# Patient Record
Sex: MALE | Race: WHITE | Employment: OTHER | URBAN - METROPOLITAN AREA
[De-identification: names, ages, dates, MRNs, and addresses within clinical notes are randomized per-mention and may not be internally consistent; named-entity substitution may affect disease eponyms.]

---

## 2019-06-10 RX ORDER — LISINOPRIL 10 MG/1
10 TABLET ORAL DAILY
Qty: 90 TABLET | Refills: 1 | Status: SHIPPED | OUTPATIENT
Start: 2019-06-10 | End: 2019-12-03 | Stop reason: SDUPTHER

## 2019-06-16 PROBLEM — E78.2 MIXED HYPERLIPIDEMIA: Status: ACTIVE | Noted: 2019-06-16

## 2019-06-16 PROBLEM — E66.09 CLASS 1 OBESITY DUE TO EXCESS CALORIES WITHOUT SERIOUS COMORBIDITY IN ADULT: Status: ACTIVE | Noted: 2019-06-16

## 2019-06-16 PROBLEM — I10 HYPERTENSION: Status: ACTIVE | Noted: 2019-06-16

## 2019-06-16 PROBLEM — M15.9 PRIMARY OSTEOARTHRITIS INVOLVING MULTIPLE JOINTS: Status: ACTIVE | Noted: 2019-06-16

## 2019-06-16 PROBLEM — E66.811 CLASS 1 OBESITY DUE TO EXCESS CALORIES WITHOUT SERIOUS COMORBIDITY IN ADULT: Status: ACTIVE | Noted: 2019-06-16

## 2019-06-16 PROBLEM — M15.0 PRIMARY OSTEOARTHRITIS INVOLVING MULTIPLE JOINTS: Status: ACTIVE | Noted: 2019-06-16

## 2019-06-16 NOTE — PROGRESS NOTES
19  Mark Marino : 1950 Sex: male  Age: 77 y.o. Chief Complaint   Patient presents with    Hypertension       Multitude of issues today. The patient has had some increased peripheral lymphedema over the summer months. Patient also is complaining of shortness of breath with exertion. This is a little bit worse than it had been previously. Denies any chest pain, chest pressure, nausea, vomiting, diaphoresis with this exertion. Patient is blaming a lot of his problems on his back. Patient has been obese for a good number of years and has really not had the motivation to lose weight. He and I discussed this today including possibly joining weight watchers. He should previously had lumbar surgery by Dr. Curtis Kohli in 2017. Had recurrent lumbar stenosis symptomatology. Planing of bilateral carpal tunnel symptoms which occur mostly at nighttime. This will need further evaluation. Patient is not having orthopnea or PND. No GI or  complaints. Review of Systems  Health Maintenance:  Colonoscopy - (2017)  Colonoscopy Screening - (2017)  Couseled on Home Safety - (5/10/2017)  Physical Exam - (2018)  Prostate Exam - (2016)  Psa Test - (2017)  Rectal Exam - (2016)  EKG  Colonoscopy - (2017) Tabatha Johnson  EGD - () VITO  Sleep Study - (2014)  Influenza Vaccination - (2017)  Prevnar Vaccine - (2018) SLIP GIVEN  Shingrix Vaccine (Shingles) - (2018) SLIP GIVEN  1. Obesity. 2. Hyperlipidemia. BEGAN SIMVISTATIN 2/1/10  3. HYPERTENSION BEGAN LISINOPRIL/HCT 2/1/10  4. Erectile dysfunction. Mark Marino  1950 Page #2  5. History of basal cell carcinoma. 6. History of previous sebaceous cyst.  7. OA HAD BILATERAL KNEE REPLACEMENTS  8. CHRONIC BACK PAIN- TASH EXERCISES RECOMMENDED 3/11  9. ESOPHAGITIS/PUD-  VITO  10. FEVER/DIARRHEA/RASH - PROBABLY VIRAL 12 ADMITTED TO [facility]- SEE REPORTS  11. HYPOGONADISM- MRI ORDERED 12  12. SNORING-FATIGUE- SLEEP APNEA- SLEEP STUDY ORDERED 10/13-CPAP BEGUN  13. LUMBAR RADICULOPATHY-8/17/15-PHILLIP/DONNY-SURGERY SCHEDULED 17  14. PNEUMONIA- 5/10/16-CXR CLEARED  15. CARDIOMEGALY-16-NORMAL ECHO 16 and  16. LOW BACK PAIN- REFERRED TO [facility] 18  17. OBESITY- GLP1 DISCUSSED 10/31/18  Surgical Hx:  Knee Replacement - () BILATERAL-  1. He had right shoulder surgery in .  2. He had a vasectomy at age 33.  1. He had bilateral knee replacements in . 4. Sebaceous cyst removal by Dr. Prashanth Ballesteros in . 5. Basal cell carcinoma by Dr. Marvin Barrios in . Reviewed and updated. FH:  Father:  Cerebrovascular Accident (CVA) - age 80. Mother:  Leukemia -  in her 29's. Reviewed, no changes. SH:  Marital: . Personal Habits: Cigarette Use: Former Cigarette Smoker 1 Pack Daily - for 30 years Negative For  current cigarette smoker. Alcohol: Occasionally consumes alcohol. Daily Caffeine: Consumes on  average 4 cups of hot tea per day - more recently 2 cups. Exercise Type: Walks sporadically. Reviewed, no changes. Date: 10/31/2018  Was the patient queried about smoking behavior? Yes No  Does the patient currently smoke? Smoking: Patient is a former smoker. Current Outpatient Medications:     sildenafil (REVATIO) 20 MG tablet, Take 1 tablet by mouth daily as needed (erectile dysfunction) 3-5 before intercourse as needed, Disp: 50 tablet, Rfl: 2    fluticasone (FLONASE) 50 MCG/ACT nasal spray, 2 sprays by Each Nostril route daily, Disp: , Rfl:     lisinopril (PRINIVIL;ZESTRIL) 10 MG tablet, Take 1 tablet by mouth daily, Disp: 90 tablet, Rfl: 1    therapeutic multivitamin-minerals (THERAGRAN-M) tablet, Take 1 tablet by mouth daily.   , Disp: , Rfl:   Allergies   Allergen Reactions    Statins        Past Medical History:   Diagnosis Date    Chronic back pain     Erectile dysfunction     Esophagitis 2011    VITO    H/O cardiomegaly     NORMAL ECHO IN 2016    H/O hypogonadism     History of basal cell carcinoma     History of pneumonia 2016    History of sebaceous cyst     Hyperlipidemia     Hypertension     Obesity     Osteoarthritis     Sleep apnea      Past Surgical History:   Procedure Laterality Date    CYST REMOVAL  2005    Shabbir Koenig    ESOPHGOSCOPY  9/10/2011    foreign body removal    KNEE SURGERY  2004    bilateral knee replacements    SHOULDER SURGERY  1997    right rotator cuff    SKIN CANCER EXCISION      CUDDAPAH    VASECTOMY      AGE 33     Family History   Problem Relation Age of Onset    Other Mother         LEUKEMIA    Stroke Father      Social History     Socioeconomic History    Marital status:      Spouse name: Not on file    Number of children: Not on file    Years of education: Not on file    Highest education level: Not on file   Occupational History    Not on file   Social Needs    Financial resource strain: Not on file    Food insecurity:     Worry: Not on file     Inability: Not on file    Transportation needs:     Medical: Not on file     Non-medical: Not on file   Tobacco Use    Smoking status: Former Smoker     Last attempt to quit:      Years since quittin.4    Smokeless tobacco: Never Used   Substance and Sexual Activity    Alcohol use:  Yes     Alcohol/week: 9.0 oz     Types: 15 Shots of liquor per week    Drug use: No    Sexual activity: Not on file   Lifestyle    Physical activity:     Days per week: Not on file     Minutes per session: Not on file    Stress: Not on file   Relationships    Social connections:     Talks on phone: Not on file     Gets together: Not on file     Attends Anabaptism service: Not on file     Active member of club or organization: Not on file     Attends meetings of clubs or organizations: Not on file     Relationship status: Not on file    Intimate partner violence:     Fear of current or ex partner: Not on file     Emotionally abused: Not on file     Physically abused: Not on file     Forced sexual activity: Not on file   Other Topics Concern    Not on file   Social History Narrative    Not on file       Vitals:    06/19/19 1105   BP: 132/84   Pulse: 75   SpO2: 95%   Weight: 273 lb (123.8 kg)   Height: 6' (1.829 m)       Physical Exam  Exam:  Const: Appears healthy,well developed and well nourished. Appears obese. Eyes: EOMI in both eyes. PERRL. ENMT: External canals are clear and dry. Tympanic membranes: thickening. External nose WNL. Moistness and normal color of the nasal mucosae. Septum is in the midline. Full upper and full lower  dentures. Gums appear healthy. Posterior pharynx shows no exudate, irritation or redness. Neck: Supple and symmetric. Palpation reveals no adenopathy. No masses appreciated. Thyroid  exhibits no nodules or thyromegaly. No JVD. Resp: Respirations are unlabored. Respiration rate is normal. Auscultate good airflow. No rales,  rhonchi or wheezes appreciated over the lungs bilaterally. CV: Rhythm is regular. S1 is normal. S2 is normal. No heart murmur appreciated. Carotids: no  bruits. Abdominal aorta is not palpable. Pedal pulses: 2+ and equal bilaterally No abdominal bruits. Extremities: No clubbing, cyanosis or edema is 1-2+ below the mid tibia bilaterally. Abdomen: Bowel sounds are normoactive. Palpation of the abdomen reveals softness, but no  distension, organomegaly or tenderness. No abdominal masses. No palpable hepatosplenomegaly. Musculo: Walks with a limping gait. Upper Extremities: Full ROM bilaterally. Lower Extremities:  Limitation of the lower extremities. Skin: Dry and warm with no rash. Neuro: Alert and oriented x3. Mood is normal. Affect is normal. Speech is articulate and fluent. Reflexes: DTR's are intact, symmetric and 2+ bilaterally. Psych: Patient's attitude is cooperative. Patient's affect is appropriate. Judgement is realistic. Insight  is appropriate. Assessment and Plan:  Javan Velasquez was seen today for hypertension. Diagnoses and all orders for this visit:    Shortness of breath  -     EKG 12 lead; Future  -     BRAIN NATRIURETIC PEPTIDE; Future  -     EKG 12 lead    Essential hypertension  -     EKG 12 lead; Future  -     Comp Metabolic w Bili Profile; Future  -     CBC; Future  -     EKG 12 lead    Primary osteoarthritis involving multiple joints    Mixed hyperlipidemia  -     LIPID PANEL; Future    Class 1 obesity due to excess calories without serious comorbidity with body mass index (BMI) of 32.0 to 32.9 in adult    Bilateral carpal tunnel syndrome  -     Luzmaria Mcdonald MD, [facility], [address] ([place])    Spinal stenosis of lumbar region with neurogenic claudication  -     Luzmaria Mcdonald MD, [facility], [address] ([place])    Erectile dysfunction due to arterial insufficiency  -     sildenafil (REVATIO) 20 MG tablet; Take 1 tablet by mouth daily as needed (erectile dysfunction) 3-5 before intercourse as needed    Patient is referred to Dr. Saji Darling for recurrent back pain and carpal tunnel. He will need EMG testing. He is getting to the point where he is not able to feel with his fingers and is dropping things. This will definitely sounds like a problem with cervical repair. Patient is not having cervical radicular symptoms so I think this is exclusively carpal tunnel. Patient will be seen back in 4 weeks. He is to join [employer]. He is to start watching salt and fluids. I will do a work-up including EKG and brain natruretic peptide. Return in about 1 month (around 7/17/2019).       Seen By:  Tanya Garcia MD

## 2019-06-18 VITALS
WEIGHT: 265 LBS | BODY MASS INDEX: 35.89 KG/M2 | HEIGHT: 72 IN | SYSTOLIC BLOOD PRESSURE: 126 MMHG | DIASTOLIC BLOOD PRESSURE: 78 MMHG | HEART RATE: 70 BPM

## 2019-06-18 RX ORDER — TADALAFIL 20 MG/1
20 TABLET ORAL PRN
COMMUNITY
End: 2019-06-19

## 2019-06-18 RX ORDER — FLUTICASONE PROPIONATE 50 MCG
2 SPRAY, SUSPENSION (ML) NASAL DAILY
COMMUNITY
End: 2019-11-04

## 2019-06-19 ENCOUNTER — HOSPITAL ENCOUNTER (OUTPATIENT)
Age: 69
Discharge: HOME | End: 2019-06-21
Payer: MEDICARE

## 2019-06-19 ENCOUNTER — OFFICE VISIT (OUTPATIENT)
Dept: FAMILY MEDICINE CLINIC | Age: 69
End: 2019-06-19
Payer: MEDICARE

## 2019-06-19 VITALS
HEART RATE: 75 BPM | HEIGHT: 72 IN | OXYGEN SATURATION: 95 % | SYSTOLIC BLOOD PRESSURE: 132 MMHG | WEIGHT: 273 LBS | BODY MASS INDEX: 36.98 KG/M2 | DIASTOLIC BLOOD PRESSURE: 84 MMHG

## 2019-06-19 DIAGNOSIS — G56.03 BILATERAL CARPAL TUNNEL SYNDROME: ICD-10-CM

## 2019-06-19 DIAGNOSIS — E78.2 MIXED HYPERLIPIDEMIA: ICD-10-CM

## 2019-06-19 DIAGNOSIS — R06.02 SHORTNESS OF BREATH: ICD-10-CM

## 2019-06-19 DIAGNOSIS — I10 ESSENTIAL HYPERTENSION: ICD-10-CM

## 2019-06-19 DIAGNOSIS — M48.062 SPINAL STENOSIS OF LUMBAR REGION WITH NEUROGENIC CLAUDICATION: ICD-10-CM

## 2019-06-19 DIAGNOSIS — N52.01 ERECTILE DYSFUNCTION DUE TO ARTERIAL INSUFFICIENCY: ICD-10-CM

## 2019-06-19 DIAGNOSIS — E66.09 CLASS 1 OBESITY DUE TO EXCESS CALORIES WITHOUT SERIOUS COMORBIDITY WITH BODY MASS INDEX (BMI) OF 32.0 TO 32.9 IN ADULT: ICD-10-CM

## 2019-06-19 DIAGNOSIS — M15.9 PRIMARY OSTEOARTHRITIS INVOLVING MULTIPLE JOINTS: ICD-10-CM

## 2019-06-19 DIAGNOSIS — R06.02 SHORTNESS OF BREATH: Primary | ICD-10-CM

## 2019-06-19 LAB
ALBUMIN SERPL-MCNC: 4 G/DL (ref 3.5–5.2)
ALP BLD-CCNC: 80 U/L (ref 40–129)
ALT SERPL-CCNC: 37 U/L (ref 0–40)
ANION GAP SERPL CALCULATED.3IONS-SCNC: 17 MMOL/L (ref 7–16)
AST SERPL-CCNC: 41 U/L (ref 0–39)
BILIRUB SERPL-MCNC: 0.5 MG/DL (ref 0–1.2)
BILIRUBIN DIRECT: <0.2 MG/DL (ref 0–0.3)
BILIRUBIN, INDIRECT: NORMAL MG/DL (ref 0–1)
BUN BLDV-MCNC: 13 MG/DL (ref 8–23)
CALCIUM SERPL-MCNC: 9.7 MG/DL (ref 8.6–10.2)
CHLORIDE BLD-SCNC: 100 MMOL/L (ref 98–107)
CHOLESTEROL, TOTAL: 240 MG/DL (ref 0–199)
CO2: 23 MMOL/L (ref 22–29)
CREAT SERPL-MCNC: 0.9 MG/DL (ref 0.7–1.2)
GFR AFRICAN AMERICAN: >60
GFR NON-AFRICAN AMERICAN: >60 ML/MIN/1.73
GLUCOSE BLD-MCNC: 98 MG/DL (ref 74–99)
HCT VFR BLD CALC: 50.9 % (ref 37–54)
HDLC SERPL-MCNC: 60 MG/DL
HEMOGLOBIN: 16.4 G/DL (ref 12.5–16.5)
LDL CHOLESTEROL CALCULATED: 144 MG/DL (ref 0–99)
MCH RBC QN AUTO: 34.1 PG (ref 26–35)
MCHC RBC AUTO-ENTMCNC: 32.2 % (ref 32–34.5)
MCV RBC AUTO: 105.8 FL (ref 80–99.9)
PDW BLD-RTO: 13.4 FL (ref 11.5–15)
PLATELET # BLD: 296 E9/L (ref 130–450)
PMV BLD AUTO: 11.5 FL (ref 7–12)
POTASSIUM SERPL-SCNC: 4.8 MMOL/L (ref 3.5–5)
PRO-BNP: 222 PG/ML (ref 0–125)
RBC # BLD: 4.81 E12/L (ref 3.8–5.8)
SODIUM BLD-SCNC: 140 MMOL/L (ref 132–146)
TOTAL PROTEIN: 7.7 G/DL (ref 6.4–8.3)
TRIGL SERPL-MCNC: 178 MG/DL (ref 0–149)
VLDLC SERPL CALC-MCNC: 36 MG/DL
WBC # BLD: 9.3 E9/L (ref 4.5–11.5)

## 2019-06-19 PROCEDURE — 82247 BILIRUBIN TOTAL: CPT

## 2019-06-19 PROCEDURE — 93000 ELECTROCARDIOGRAM COMPLETE: CPT | Performed by: INTERNAL MEDICINE

## 2019-06-19 PROCEDURE — 82248 BILIRUBIN DIRECT: CPT

## 2019-06-19 PROCEDURE — 80061 LIPID PANEL: CPT

## 2019-06-19 PROCEDURE — 83880 ASSAY OF NATRIURETIC PEPTIDE: CPT

## 2019-06-19 PROCEDURE — 85027 COMPLETE CBC AUTOMATED: CPT

## 2019-06-19 PROCEDURE — 99214 OFFICE O/P EST MOD 30 MIN: CPT | Performed by: INTERNAL MEDICINE

## 2019-06-19 PROCEDURE — 36415 COLL VENOUS BLD VENIPUNCTURE: CPT

## 2019-06-19 PROCEDURE — 80053 COMPREHEN METABOLIC PANEL: CPT

## 2019-06-19 RX ORDER — SILDENAFIL CITRATE 20 MG/1
20 TABLET ORAL DAILY PRN
Qty: 50 TABLET | Refills: 2 | Status: ON HOLD | OUTPATIENT
Start: 2019-06-19 | End: 2019-12-27 | Stop reason: HOSPADM

## 2019-06-21 ENCOUNTER — TELEPHONE (OUTPATIENT)
Dept: FAMILY MEDICINE CLINIC | Age: 69
End: 2019-06-21

## 2019-06-21 NOTE — TELEPHONE ENCOUNTER
----- Message from Carly Galicia MD sent at 6/20/2019  5:17 AM EDT -----  Lipids are very high. Other blood work indicates probable excessive alcohol consumption. This needs to be severely limited. Patient appears to be fluid overloaded. He should begin Lasix 20 mg every other day. He will need to follow in 2 to 3 weeks since we are starting the Lasix. Please cue the Lasix. He needs to begin Zetia to control his lipids. Please cue this at 10 mg/day. Is call and send the results to the patient.

## 2019-06-24 RX ORDER — FUROSEMIDE 20 MG/1
20 TABLET ORAL 2 TIMES DAILY
COMMUNITY
End: 2019-06-24 | Stop reason: SDUPTHER

## 2019-06-24 RX ORDER — EZETIMIBE 10 MG/1
10 TABLET ORAL DAILY
COMMUNITY
End: 2019-06-26 | Stop reason: SDUPTHER

## 2019-06-26 RX ORDER — EZETIMIBE 10 MG/1
10 TABLET ORAL DAILY
Qty: 30 TABLET | Refills: 2 | Status: SHIPPED | OUTPATIENT
Start: 2019-06-26 | End: 2019-07-17 | Stop reason: CLARIF

## 2019-07-10 RX ORDER — EZETIMIBE 10 MG/1
10 TABLET ORAL DAILY
Qty: 30 TABLET | Refills: 1 | Status: SHIPPED | OUTPATIENT
Start: 2019-07-10 | End: 2019-11-04

## 2019-07-10 RX ORDER — FUROSEMIDE 20 MG/1
20 TABLET ORAL EVERY OTHER DAY
Qty: 30 TABLET | Refills: 0 | Status: SHIPPED | OUTPATIENT
Start: 2019-07-10 | End: 2019-11-04

## 2019-07-17 ENCOUNTER — OFFICE VISIT (OUTPATIENT)
Dept: FAMILY MEDICINE CLINIC | Age: 69
End: 2019-07-17
Payer: MEDICARE

## 2019-07-17 VITALS
HEIGHT: 72 IN | OXYGEN SATURATION: 97 % | DIASTOLIC BLOOD PRESSURE: 70 MMHG | SYSTOLIC BLOOD PRESSURE: 130 MMHG | BODY MASS INDEX: 36.44 KG/M2 | WEIGHT: 269 LBS | HEART RATE: 81 BPM

## 2019-07-17 DIAGNOSIS — G47.30 SLEEP APNEA, UNSPECIFIED TYPE: ICD-10-CM

## 2019-07-17 DIAGNOSIS — G56.03 BILATERAL CARPAL TUNNEL SYNDROME: ICD-10-CM

## 2019-07-17 DIAGNOSIS — R21 RASH: ICD-10-CM

## 2019-07-17 DIAGNOSIS — N52.01 ERECTILE DYSFUNCTION DUE TO ARTERIAL INSUFFICIENCY: ICD-10-CM

## 2019-07-17 DIAGNOSIS — I50.9 CONGESTIVE HEART FAILURE, UNSPECIFIED HF CHRONICITY, UNSPECIFIED HEART FAILURE TYPE (HCC): Primary | ICD-10-CM

## 2019-07-17 PROBLEM — N52.9 ERECTILE DYSFUNCTION: Status: ACTIVE | Noted: 2019-07-17

## 2019-07-17 PROCEDURE — 99214 OFFICE O/P EST MOD 30 MIN: CPT | Performed by: INTERNAL MEDICINE

## 2019-07-17 NOTE — PROGRESS NOTES
 Esophagitis 2011    VITO    H/O cardiomegaly     NORMAL ECHO IN 2016    H/O hypogonadism 2012    History of basal cell carcinoma     History of pneumonia 2016    History of sebaceous cyst     Hyperlipidemia     Hypertension     Obesity     Osteoarthritis     Sleep apnea      Past Surgical History:   Procedure Laterality Date    CYST REMOVAL  2005    Benjaman Brow    ESOPHGOSCOPY  9/10/2011    foreign body removal    KNEE SURGERY  2004    bilateral knee replacements    SHOULDER SURGERY  1997    right rotator cuff    SKIN CANCER EXCISION  2003    CUDDAPAH    VASECTOMY      AGE 33     Family History   Problem Relation Age of Onset    Other Mother         LEUKEMIA    Stroke Father      Social History     Socioeconomic History    Marital status:      Spouse name: Not on file    Number of children: Not on file    Years of education: Not on file    Highest education level: Not on file   Occupational History    Not on file   Social Needs    Financial resource strain: Not on file    Food insecurity:     Worry: Not on file     Inability: Not on file    Transportation needs:     Medical: Not on file     Non-medical: Not on file   Tobacco Use    Smoking status: Former Smoker     Last attempt to quit:      Years since quittin.5    Smokeless tobacco: Never Used   Substance and Sexual Activity    Alcohol use:  Yes     Alcohol/week: 15.0 standard drinks     Types: 15 Shots of liquor per week    Drug use: No    Sexual activity: Not on file   Lifestyle    Physical activity:     Days per week: Not on file     Minutes per session: Not on file    Stress: Not on file   Relationships    Social connections:     Talks on phone: Not on file     Gets together: Not on file     Attends Mandaeism service: Not on file     Active member of club or organization: Not on file     Attends meetings of clubs or organizations: Not on file     Relationship status: Not on file    Intimate partner violence: Fear of current or ex partner: Not on file     Emotionally abused: Not on file     Physically abused: Not on file     Forced sexual activity: Not on file   Other Topics Concern    Not on file   Social History Narrative    Not on file       Vitals:    07/17/19 1519   BP: 130/70   Pulse: 81   SpO2: 97%   Weight: 269 lb (122 kg)   Height: 6' (1.829 m)       Physical Exam  Exam:  Const: Appears healthy,well developed and well nourished. Appears obese. Eyes: EOMI in both eyes. PERRL. ENMT: External canals are clear and dry. Tympanic membranes: thickening. External nose WNL. Moistness and normal color of the nasal mucosae. Septum is in the midline. Full upper and full lower  dentures. Gums appear healthy. Posterior pharynx shows no exudate, irritation or redness. Neck: Supple and symmetric. Palpation reveals no adenopathy. No masses appreciated. Thyroid  exhibits no nodules or thyromegaly. No JVD. Resp: Respirations are unlabored. Respiration rate is normal. Auscultate good airflow. No rales,  rhonchi or wheezes appreciated over the lungs bilaterally. CV: Rhythm is regular. S1 is normal. S2 is normal. No heart murmur appreciated. Carotids: no  bruits. Abdominal aorta is not palpable. Pedal pulses: 2+ and equal bilaterally No abdominal bruits. Extremities: No clubbing, cyanosis or edema is 1-2+ below the mid tibia bilaterally. Abdomen: Bowel sounds are normoactive. Palpation of the abdomen reveals softness, but no  distension, organomegaly or tenderness. No abdominal masses. No palpable hepatosplenomegaly. Musculo: Walks with a limping gait. Upper Extremities: Full ROM bilaterally. Lower Extremities:  Limitation of the lower extremities. Skin: Dry and warm with no rash. Neuro: Alert and oriented x3. Mood is normal. Affect is normal. Speech is articulate and fluent. Reflexes: DTR's are intact, symmetric and 2+ bilaterally. Psych: Patient's attitude is cooperative. Patient's affect is appropriate.

## 2019-07-31 ENCOUNTER — OFFICE VISIT (OUTPATIENT)
Dept: FAMILY MEDICINE CLINIC | Age: 69
End: 2019-07-31
Payer: MEDICARE

## 2019-07-31 ENCOUNTER — HOSPITAL ENCOUNTER (OUTPATIENT)
Age: 69
Discharge: HOME | End: 2019-08-02
Payer: MEDICARE

## 2019-07-31 VITALS
SYSTOLIC BLOOD PRESSURE: 126 MMHG | OXYGEN SATURATION: 96 % | HEIGHT: 72 IN | HEART RATE: 64 BPM | DIASTOLIC BLOOD PRESSURE: 80 MMHG | BODY MASS INDEX: 36.3 KG/M2 | WEIGHT: 268 LBS

## 2019-07-31 DIAGNOSIS — I50.9 CONGESTIVE HEART FAILURE, UNSPECIFIED HF CHRONICITY, UNSPECIFIED HEART FAILURE TYPE (HCC): ICD-10-CM

## 2019-07-31 DIAGNOSIS — E78.2 MIXED HYPERLIPIDEMIA: ICD-10-CM

## 2019-07-31 DIAGNOSIS — I50.9 CONGESTIVE HEART FAILURE, UNSPECIFIED HF CHRONICITY, UNSPECIFIED HEART FAILURE TYPE (HCC): Primary | ICD-10-CM

## 2019-07-31 DIAGNOSIS — M15.9 PRIMARY OSTEOARTHRITIS INVOLVING MULTIPLE JOINTS: ICD-10-CM

## 2019-07-31 LAB
ALBUMIN SERPL-MCNC: 4 G/DL (ref 3.5–5.2)
ALP BLD-CCNC: 79 U/L (ref 40–129)
ALT SERPL-CCNC: 50 U/L (ref 0–40)
ANION GAP SERPL CALCULATED.3IONS-SCNC: 13 MMOL/L (ref 7–16)
AST SERPL-CCNC: 52 U/L (ref 0–39)
BILIRUB SERPL-MCNC: 0.5 MG/DL (ref 0–1.2)
BUN BLDV-MCNC: 16 MG/DL (ref 8–23)
CALCIUM SERPL-MCNC: 9.6 MG/DL (ref 8.6–10.2)
CHLORIDE BLD-SCNC: 102 MMOL/L (ref 98–107)
CHOLESTEROL, TOTAL: 219 MG/DL (ref 0–199)
CO2: 24 MMOL/L (ref 22–29)
CREAT SERPL-MCNC: 0.9 MG/DL (ref 0.7–1.2)
GFR AFRICAN AMERICAN: >60
GFR NON-AFRICAN AMERICAN: >60 ML/MIN/1.73
GLUCOSE BLD-MCNC: 102 MG/DL (ref 74–99)
HDLC SERPL-MCNC: 57 MG/DL
LDL CHOLESTEROL CALCULATED: 133 MG/DL (ref 0–99)
POTASSIUM SERPL-SCNC: 4.6 MMOL/L (ref 3.5–5)
PRO-BNP: 149 PG/ML (ref 0–125)
SODIUM BLD-SCNC: 139 MMOL/L (ref 132–146)
TOTAL PROTEIN: 7.9 G/DL (ref 6.4–8.3)
TRIGL SERPL-MCNC: 144 MG/DL (ref 0–149)
VLDLC SERPL CALC-MCNC: 29 MG/DL

## 2019-07-31 PROCEDURE — 83880 ASSAY OF NATRIURETIC PEPTIDE: CPT

## 2019-07-31 PROCEDURE — 80053 COMPREHEN METABOLIC PANEL: CPT

## 2019-07-31 PROCEDURE — 36415 COLL VENOUS BLD VENIPUNCTURE: CPT

## 2019-07-31 PROCEDURE — 99213 OFFICE O/P EST LOW 20 MIN: CPT | Performed by: INTERNAL MEDICINE

## 2019-07-31 PROCEDURE — 80061 LIPID PANEL: CPT

## 2019-07-31 NOTE — PROGRESS NOTES
Weight: 268 lb (121.6 kg)   Height: 6' (1.829 m)       Physical Exam  Exam:  Const: Appears healthy,well developed and well nourished. Appears obese. Eyes: EOMI in both eyes. PERRL. ENMT: External canals are clear and dry. Tympanic membranes: thickening. External nose WNL. Moistness and normal color of the nasal mucosae. Septum is in the midline. Full upper and full lower  dentures. Gums appear healthy. Posterior pharynx shows no exudate, irritation or redness. Neck: Supple and symmetric. Palpation reveals no adenopathy. No masses appreciated. Thyroid  exhibits no nodules or thyromegaly. No JVD. Resp: Respirations are unlabored. Respiration rate is normal. Auscultate good airflow. No rales,  rhonchi or wheezes appreciated over the lungs bilaterally. CV: Rhythm is regular. S1 is normal. S2 is normal. No heart murmur appreciated. Carotids: no  bruits. Abdominal aorta is not palpable. Pedal pulses: 2+ and equal bilaterally No abdominal bruits. Extremities: No clubbing, cyanosis or edema is 1-2+ below the mid tibia bilaterally. Abdomen: Bowel sounds are normoactive. Palpation of the abdomen reveals softness, but no  distension, organomegaly or tenderness. No abdominal masses. No palpable hepatosplenomegaly. Musculo: Walks with a limping gait. Upper Extremities: Full ROM bilaterally. Lower Extremities:  Limitation of the lower extremities. Skin: Dry and warm with no rash. Neuro: Alert and oriented x3. Mood is normal. Affect is normal. Speech is articulate and fluent. Reflexes: DTR's are intact, symmetric and 2+ bilaterally. Psych: Patient's attitude is cooperative. Patient's affect is appropriate. Judgement is realistic. Insight  is appropriate. Julia Pinedo was seen today for hypertension. Diagnoses and all orders for this visit:    Congestive heart failure, unspecified HF chronicity, unspecified heart failure type ([address]  -     COMPREHENSIVE METABOLIC PANEL;  Future  -     BRAIN NATRIURETIC PEPTIDE;

## 2019-10-16 ENCOUNTER — TELEPHONE (OUTPATIENT)
Dept: FAMILY MEDICINE CLINIC | Age: 69
End: 2019-10-16

## 2019-11-01 ENCOUNTER — HOSPITAL ENCOUNTER (OUTPATIENT)
Age: 69
Discharge: HOME | End: 2019-11-01
Payer: MEDICARE

## 2019-11-01 LAB
ANION GAP SERPL CALCULATED.3IONS-SCNC: 13 MMOL/L (ref 7–16)
APTT: 33.2 SEC (ref 24.5–35.1)
BASOPHILS ABSOLUTE: 0.07 E9/L (ref 0–0.2)
BASOPHILS RELATIVE PERCENT: 0.8 % (ref 0–2)
BUN BLDV-MCNC: 12 MG/DL (ref 8–23)
CALCIUM SERPL-MCNC: 9.8 MG/DL (ref 8.6–10.2)
CHLORIDE BLD-SCNC: 97 MMOL/L (ref 98–107)
CO2: 26 MMOL/L (ref 22–29)
CREAT SERPL-MCNC: 0.9 MG/DL (ref 0.7–1.2)
EOSINOPHILS ABSOLUTE: 0.47 E9/L (ref 0.05–0.5)
EOSINOPHILS RELATIVE PERCENT: 5.1 % (ref 0–6)
GFR AFRICAN AMERICAN: >60
GFR NON-AFRICAN AMERICAN: >60 ML/MIN/1.73
GLUCOSE BLD-MCNC: 96 MG/DL (ref 74–99)
HCT VFR BLD CALC: 51.6 % (ref 37–54)
HEMOGLOBIN: 17 G/DL (ref 12.5–16.5)
IMMATURE GRANULOCYTES #: 0.04 E9/L
IMMATURE GRANULOCYTES %: 0.4 % (ref 0–5)
INR BLD: 1
LYMPHOCYTES ABSOLUTE: 1.58 E9/L (ref 1.5–4)
LYMPHOCYTES RELATIVE PERCENT: 17.1 % (ref 20–42)
MCH RBC QN AUTO: 33.9 PG (ref 26–35)
MCHC RBC AUTO-ENTMCNC: 32.9 % (ref 32–34.5)
MCV RBC AUTO: 102.8 FL (ref 80–99.9)
MONOCYTES ABSOLUTE: 0.91 E9/L (ref 0.1–0.95)
MONOCYTES RELATIVE PERCENT: 9.8 % (ref 2–12)
NEUTROPHILS ABSOLUTE: 6.18 E9/L (ref 1.8–7.3)
NEUTROPHILS RELATIVE PERCENT: 66.8 % (ref 43–80)
PDW BLD-RTO: 12.9 FL (ref 11.5–15)
PLATELET # BLD: 289 E9/L (ref 130–450)
PMV BLD AUTO: 11.2 FL (ref 7–12)
POTASSIUM SERPL-SCNC: 4.2 MMOL/L (ref 3.5–5)
PROTHROMBIN TIME: 11.4 SEC (ref 9.3–12.4)
RBC # BLD: 5.02 E12/L (ref 3.8–5.8)
SODIUM BLD-SCNC: 136 MMOL/L (ref 132–146)
WBC # BLD: 9.3 E9/L (ref 4.5–11.5)

## 2019-11-01 PROCEDURE — 85730 THROMBOPLASTIN TIME PARTIAL: CPT

## 2019-11-01 PROCEDURE — 85610 PROTHROMBIN TIME: CPT

## 2019-11-01 PROCEDURE — 85025 COMPLETE CBC W/AUTO DIFF WBC: CPT

## 2019-11-01 PROCEDURE — 36415 COLL VENOUS BLD VENIPUNCTURE: CPT

## 2019-11-01 PROCEDURE — 80048 BASIC METABOLIC PNL TOTAL CA: CPT

## 2019-11-04 ENCOUNTER — APPOINTMENT (OUTPATIENT)
Dept: CT IMAGING | Age: 69
End: 2019-11-04
Payer: MEDICARE

## 2019-11-04 ENCOUNTER — HOSPITAL ENCOUNTER (EMERGENCY)
Age: 69
Discharge: ANOTHER HOSPITAL | End: 2019-11-05
Attending: EMERGENCY MEDICINE
Payer: MEDICARE

## 2019-11-04 ENCOUNTER — APPOINTMENT (OUTPATIENT)
Dept: GENERAL RADIOLOGY | Age: 69
End: 2019-11-04
Payer: MEDICARE

## 2019-11-04 VITALS
OXYGEN SATURATION: 95 % | TEMPERATURE: 97.9 F | HEIGHT: 72 IN | WEIGHT: 264 LBS | HEART RATE: 65 BPM | DIASTOLIC BLOOD PRESSURE: 96 MMHG | RESPIRATION RATE: 16 BRPM | SYSTOLIC BLOOD PRESSURE: 161 MMHG | BODY MASS INDEX: 35.76 KG/M2

## 2019-11-04 DIAGNOSIS — R07.9 CHEST PAIN, UNSPECIFIED TYPE: Primary | ICD-10-CM

## 2019-11-04 PROBLEM — I20.0 UNSTABLE ANGINA (HCC): Status: ACTIVE | Noted: 2019-11-04

## 2019-11-04 LAB
ALBUMIN SERPL-MCNC: 4.1 G/DL (ref 3.5–5.2)
ALP BLD-CCNC: 84 U/L (ref 40–129)
ALT SERPL-CCNC: 27 U/L (ref 0–40)
ANION GAP SERPL CALCULATED.3IONS-SCNC: 12 MMOL/L (ref 7–16)
AST SERPL-CCNC: 27 U/L (ref 0–39)
BASOPHILS ABSOLUTE: 0.08 E9/L (ref 0–0.2)
BASOPHILS RELATIVE PERCENT: 0.8 % (ref 0–2)
BILIRUB SERPL-MCNC: 0.3 MG/DL (ref 0–1.2)
BUN BLDV-MCNC: 12 MG/DL (ref 8–23)
CALCIUM SERPL-MCNC: 9.3 MG/DL (ref 8.6–10.2)
CHLORIDE BLD-SCNC: 102 MMOL/L (ref 98–107)
CO2: 25 MMOL/L (ref 22–29)
CREAT SERPL-MCNC: 0.9 MG/DL (ref 0.7–1.2)
EKG ATRIAL RATE: 60 BPM
EKG P AXIS: 23 DEGREES
EKG P-R INTERVAL: 192 MS
EKG Q-T INTERVAL: 432 MS
EKG QRS DURATION: 148 MS
EKG QTC CALCULATION (BAZETT): 432 MS
EKG R AXIS: -55 DEGREES
EKG T AXIS: 32 DEGREES
EKG VENTRICULAR RATE: 60 BPM
EOSINOPHILS ABSOLUTE: 0.55 E9/L (ref 0.05–0.5)
EOSINOPHILS RELATIVE PERCENT: 5.2 % (ref 0–6)
GFR AFRICAN AMERICAN: >60
GFR NON-AFRICAN AMERICAN: >60 ML/MIN/1.73
GLUCOSE BLD-MCNC: 101 MG/DL (ref 74–99)
HCT VFR BLD CALC: 50.8 % (ref 37–54)
HEMOGLOBIN: 16.9 G/DL (ref 12.5–16.5)
IMMATURE GRANULOCYTES #: 0.06 E9/L
IMMATURE GRANULOCYTES %: 0.6 % (ref 0–5)
LYMPHOCYTES ABSOLUTE: 1.79 E9/L (ref 1.5–4)
LYMPHOCYTES RELATIVE PERCENT: 17 % (ref 20–42)
MCH RBC QN AUTO: 34.3 PG (ref 26–35)
MCHC RBC AUTO-ENTMCNC: 33.3 % (ref 32–34.5)
MCV RBC AUTO: 103 FL (ref 80–99.9)
MONOCYTES ABSOLUTE: 0.94 E9/L (ref 0.1–0.95)
MONOCYTES RELATIVE PERCENT: 8.9 % (ref 2–12)
NEUTROPHILS ABSOLUTE: 7.09 E9/L (ref 1.8–7.3)
NEUTROPHILS RELATIVE PERCENT: 67.5 % (ref 43–80)
PDW BLD-RTO: 12.7 FL (ref 11.5–15)
PLATELET # BLD: 265 E9/L (ref 130–450)
PMV BLD AUTO: 11.1 FL (ref 7–12)
POTASSIUM REFLEX MAGNESIUM: 4.1 MMOL/L (ref 3.5–5)
PRO-BNP: 364 PG/ML (ref 0–125)
RBC # BLD: 4.93 E12/L (ref 3.8–5.8)
SODIUM BLD-SCNC: 139 MMOL/L (ref 132–146)
TOTAL PROTEIN: 7.8 G/DL (ref 6.4–8.3)
TROPONIN: <0.01 NG/ML (ref 0–0.03)
WBC # BLD: 10.5 E9/L (ref 4.5–11.5)

## 2019-11-04 PROCEDURE — 70450 CT HEAD/BRAIN W/O DYE: CPT

## 2019-11-04 PROCEDURE — 6370000000 HC RX 637 (ALT 250 FOR IP): Performed by: EMERGENCY MEDICINE

## 2019-11-04 PROCEDURE — 93005 ELECTROCARDIOGRAM TRACING: CPT | Performed by: EMERGENCY MEDICINE

## 2019-11-04 PROCEDURE — 71045 X-RAY EXAM CHEST 1 VIEW: CPT

## 2019-11-04 PROCEDURE — 93010 ELECTROCARDIOGRAM REPORT: CPT | Performed by: INTERNAL MEDICINE

## 2019-11-04 PROCEDURE — 36415 COLL VENOUS BLD VENIPUNCTURE: CPT

## 2019-11-04 PROCEDURE — 80053 COMPREHEN METABOLIC PANEL: CPT

## 2019-11-04 PROCEDURE — 2580000003 HC RX 258: Performed by: EMERGENCY MEDICINE

## 2019-11-04 PROCEDURE — 99285 EMERGENCY DEPT VISIT HI MDM: CPT

## 2019-11-04 PROCEDURE — 83880 ASSAY OF NATRIURETIC PEPTIDE: CPT

## 2019-11-04 PROCEDURE — 84484 ASSAY OF TROPONIN QUANT: CPT

## 2019-11-04 PROCEDURE — 85025 COMPLETE CBC W/AUTO DIFF WBC: CPT

## 2019-11-04 RX ORDER — 0.9 % SODIUM CHLORIDE 0.9 %
1000 INTRAVENOUS SOLUTION INTRAVENOUS ONCE
Status: COMPLETED | OUTPATIENT
Start: 2019-11-04 | End: 2019-11-04

## 2019-11-04 RX ORDER — SODIUM CHLORIDE 0.9 % (FLUSH) 0.9 %
10 SYRINGE (ML) INJECTION PRN
Status: DISCONTINUED | OUTPATIENT
Start: 2019-11-04 | End: 2019-11-05 | Stop reason: HOSPADM

## 2019-11-04 RX ORDER — ASPIRIN 81 MG/1
324 TABLET, CHEWABLE ORAL ONCE
Status: COMPLETED | OUTPATIENT
Start: 2019-11-04 | End: 2019-11-04

## 2019-11-04 RX ADMIN — ASPIRIN 81 MG 324 MG: 81 TABLET ORAL at 17:26

## 2019-11-04 RX ADMIN — SODIUM CHLORIDE 1000 ML: 9 INJECTION, SOLUTION INTRAVENOUS at 17:28

## 2019-11-04 ASSESSMENT — ENCOUNTER SYMPTOMS
BLOOD IN STOOL: 0
EYE PAIN: 0
RHINORRHEA: 0
SORE THROAT: 0
ABDOMINAL PAIN: 0
BACK PAIN: 0
NAUSEA: 0
SHORTNESS OF BREATH: 1
COUGH: 0
CHEST TIGHTNESS: 1
COLOR CHANGE: 0
VOMITING: 0
DIARRHEA: 0

## 2019-11-05 ENCOUNTER — HOSPITAL ENCOUNTER (INPATIENT)
Age: 69
LOS: 1 days | Discharge: HOME | DRG: 065 | End: 2019-11-07
Attending: INTERNAL MEDICINE | Admitting: INTERNAL MEDICINE
Payer: MEDICARE

## 2019-11-05 ENCOUNTER — APPOINTMENT (OUTPATIENT)
Dept: MRI IMAGING | Age: 69
DRG: 065 | End: 2019-11-05
Attending: INTERNAL MEDICINE
Payer: MEDICARE

## 2019-11-05 ENCOUNTER — HOSPITAL ENCOUNTER (OUTPATIENT)
Age: 69
Discharge: HOME | End: 2019-11-05
Payer: MEDICARE

## 2019-11-05 ENCOUNTER — APPOINTMENT (OUTPATIENT)
Dept: GENERAL RADIOLOGY | Age: 69
DRG: 065 | End: 2019-11-05
Attending: INTERNAL MEDICINE
Payer: MEDICARE

## 2019-11-05 PROBLEM — G25.5 CHOREA: Status: ACTIVE | Noted: 2019-11-05

## 2019-11-05 LAB
REASON FOR REJECTION: NORMAL
REASON FOR REJECTION: NORMAL
REJECTED TEST: NORMAL
REJECTED TEST: NORMAL
TROPONIN: <0.01 NG/ML (ref 0–0.03)

## 2019-11-05 PROCEDURE — A0426 ALS 1: HCPCS

## 2019-11-05 PROCEDURE — 70030 X-RAY EYE FOR FOREIGN BODY: CPT

## 2019-11-05 PROCEDURE — G0379 DIRECT REFER HOSPITAL OBSERV: HCPCS

## 2019-11-05 PROCEDURE — 36415 COLL VENOUS BLD VENIPUNCTURE: CPT

## 2019-11-05 PROCEDURE — 84484 ASSAY OF TROPONIN QUANT: CPT

## 2019-11-05 PROCEDURE — G0378 HOSPITAL OBSERVATION PER HR: HCPCS

## 2019-11-05 PROCEDURE — 70551 MRI BRAIN STEM W/O DYE: CPT

## 2019-11-05 PROCEDURE — 96374 THER/PROPH/DIAG INJ IV PUSH: CPT

## 2019-11-05 PROCEDURE — APPSS60 APP SPLIT SHARED TIME 46-60 MINUTES: Performed by: PHYSICIAN ASSISTANT

## 2019-11-05 PROCEDURE — 6360000002 HC RX W HCPCS: Performed by: PHYSICIAN ASSISTANT

## 2019-11-05 PROCEDURE — 2580000003 HC RX 258: Performed by: INTERNAL MEDICINE

## 2019-11-05 PROCEDURE — 6360000002 HC RX W HCPCS: Performed by: INTERNAL MEDICINE

## 2019-11-05 PROCEDURE — A0425 GROUND MILEAGE: HCPCS

## 2019-11-05 PROCEDURE — 6370000000 HC RX 637 (ALT 250 FOR IP): Performed by: INTERNAL MEDICINE

## 2019-11-05 PROCEDURE — 96375 TX/PRO/DX INJ NEW DRUG ADDON: CPT

## 2019-11-05 RX ORDER — ONDANSETRON 2 MG/ML
4 INJECTION INTRAMUSCULAR; INTRAVENOUS EVERY 6 HOURS PRN
Status: DISCONTINUED | OUTPATIENT
Start: 2019-11-05 | End: 2019-11-07 | Stop reason: HOSPADM

## 2019-11-05 RX ORDER — SODIUM CHLORIDE 0.9 % (FLUSH) 0.9 %
10 SYRINGE (ML) INJECTION PRN
Status: DISCONTINUED | OUTPATIENT
Start: 2019-11-05 | End: 2019-11-07 | Stop reason: HOSPADM

## 2019-11-05 RX ORDER — LISINOPRIL 10 MG/1
10 TABLET ORAL DAILY
Status: DISCONTINUED | OUTPATIENT
Start: 2019-11-05 | End: 2019-11-07 | Stop reason: HOSPADM

## 2019-11-05 RX ORDER — LORAZEPAM 2 MG/ML
1 INJECTION INTRAMUSCULAR SEE ADMIN INSTRUCTIONS
Status: DISCONTINUED | OUTPATIENT
Start: 2019-11-05 | End: 2019-11-07 | Stop reason: HOSPADM

## 2019-11-05 RX ORDER — NITROGLYCERIN 0.4 MG/1
0.4 TABLET SUBLINGUAL EVERY 5 MIN PRN
Status: DISCONTINUED | OUTPATIENT
Start: 2019-11-05 | End: 2019-11-07 | Stop reason: HOSPADM

## 2019-11-05 RX ORDER — HYDRALAZINE HYDROCHLORIDE 20 MG/ML
10 INJECTION INTRAMUSCULAR; INTRAVENOUS EVERY 6 HOURS PRN
Status: DISCONTINUED | OUTPATIENT
Start: 2019-11-05 | End: 2019-11-07 | Stop reason: HOSPADM

## 2019-11-05 RX ORDER — SODIUM CHLORIDE 0.9 % (FLUSH) 0.9 %
10 SYRINGE (ML) INJECTION EVERY 12 HOURS SCHEDULED
Status: DISCONTINUED | OUTPATIENT
Start: 2019-11-05 | End: 2019-11-07 | Stop reason: HOSPADM

## 2019-11-05 RX ORDER — LORAZEPAM 2 MG/ML
0.5 INJECTION INTRAMUSCULAR SEE ADMIN INSTRUCTIONS
Status: DISCONTINUED | OUTPATIENT
Start: 2019-11-05 | End: 2019-11-05

## 2019-11-05 RX ORDER — ASPIRIN 81 MG/1
81 TABLET, CHEWABLE ORAL DAILY
Status: DISCONTINUED | OUTPATIENT
Start: 2019-11-06 | End: 2019-11-07 | Stop reason: HOSPADM

## 2019-11-05 RX ADMIN — LORAZEPAM 1 MG: 2 INJECTION INTRAMUSCULAR; INTRAVENOUS at 20:36

## 2019-11-05 RX ADMIN — HYDRALAZINE HYDROCHLORIDE 10 MG: 20 INJECTION INTRAMUSCULAR; INTRAVENOUS at 22:45

## 2019-11-05 RX ADMIN — Medication 10 ML: at 20:36

## 2019-11-05 RX ADMIN — LISINOPRIL 10 MG: 10 TABLET ORAL at 09:21

## 2019-11-05 RX ADMIN — Medication 10 ML: at 09:21

## 2019-11-05 RX ADMIN — Medication 10 ML: at 22:45

## 2019-11-05 ASSESSMENT — PAIN SCALES - GENERAL
PAINLEVEL_OUTOF10: 0
PAINLEVEL_OUTOF10: 0

## 2019-11-06 ENCOUNTER — APPOINTMENT (OUTPATIENT)
Dept: CT IMAGING | Age: 69
DRG: 065 | End: 2019-11-06
Attending: INTERNAL MEDICINE
Payer: MEDICARE

## 2019-11-06 PROBLEM — I63.9 ACUTE CEREBROVASCULAR ACCIDENT (CVA) (HCC): Status: ACTIVE | Noted: 2019-11-06

## 2019-11-06 LAB
CHOLESTEROL, TOTAL: 198 MG/DL (ref 0–199)
HBA1C MFR BLD: 6.1 % (ref 4–5.6)
HCT VFR BLD CALC: 50.6 % (ref 37–54)
HDLC SERPL-MCNC: 51 MG/DL
HEMOGLOBIN: 16.9 G/DL (ref 12.5–16.5)
LDL CHOLESTEROL CALCULATED: 125 MG/DL (ref 0–99)
MCH RBC QN AUTO: 33.6 PG (ref 26–35)
MCHC RBC AUTO-ENTMCNC: 33.4 % (ref 32–34.5)
MCV RBC AUTO: 100.6 FL (ref 80–99.9)
PDW BLD-RTO: 12.6 FL (ref 11.5–15)
PLATELET # BLD: 263 E9/L (ref 130–450)
PMV BLD AUTO: 11.2 FL (ref 7–12)
RBC # BLD: 5.03 E12/L (ref 3.8–5.8)
TRIGL SERPL-MCNC: 112 MG/DL (ref 0–149)
VITAMIN B-12: 420 PG/ML (ref 211–946)
VLDLC SERPL CALC-MCNC: 22 MG/DL
WBC # BLD: 9.7 E9/L (ref 4.5–11.5)

## 2019-11-06 PROCEDURE — 80061 LIPID PANEL: CPT

## 2019-11-06 PROCEDURE — 6360000004 HC RX CONTRAST MEDICATION: Performed by: RADIOLOGY

## 2019-11-06 PROCEDURE — 6360000002 HC RX W HCPCS: Performed by: INTERNAL MEDICINE

## 2019-11-06 PROCEDURE — 99233 SBSQ HOSP IP/OBS HIGH 50: CPT | Performed by: PHYSICIAN ASSISTANT

## 2019-11-06 PROCEDURE — 82607 VITAMIN B-12: CPT

## 2019-11-06 PROCEDURE — 6370000000 HC RX 637 (ALT 250 FOR IP): Performed by: INTERNAL MEDICINE

## 2019-11-06 PROCEDURE — 2580000003 HC RX 258: Performed by: INTERNAL MEDICINE

## 2019-11-06 PROCEDURE — 70498 CT ANGIOGRAPHY NECK: CPT

## 2019-11-06 PROCEDURE — 2140000000 HC CCU INTERMEDIATE R&B

## 2019-11-06 PROCEDURE — 83036 HEMOGLOBIN GLYCOSYLATED A1C: CPT

## 2019-11-06 PROCEDURE — 70496 CT ANGIOGRAPHY HEAD: CPT

## 2019-11-06 PROCEDURE — 85027 COMPLETE CBC AUTOMATED: CPT

## 2019-11-06 PROCEDURE — 36415 COLL VENOUS BLD VENIPUNCTURE: CPT

## 2019-11-06 RX ORDER — PRAVASTATIN SODIUM 20 MG
40 TABLET ORAL NIGHTLY
Status: DISCONTINUED | OUTPATIENT
Start: 2019-11-06 | End: 2019-11-06

## 2019-11-06 RX ORDER — CARVEDILOL 6.25 MG/1
12.5 TABLET ORAL 2 TIMES DAILY WITH MEALS
Status: DISCONTINUED | OUTPATIENT
Start: 2019-11-06 | End: 2019-11-07 | Stop reason: HOSPADM

## 2019-11-06 RX ORDER — ROSUVASTATIN CALCIUM 20 MG/1
10 TABLET, COATED ORAL NIGHTLY
Status: DISCONTINUED | OUTPATIENT
Start: 2019-11-06 | End: 2019-11-07 | Stop reason: HOSPADM

## 2019-11-06 RX ADMIN — ROSUVASTATIN CALCIUM 10 MG: 20 TABLET, FILM COATED ORAL at 23:01

## 2019-11-06 RX ADMIN — CARVEDILOL 12.5 MG: 6.25 TABLET, FILM COATED ORAL at 17:58

## 2019-11-06 RX ADMIN — Medication 10 ML: at 13:47

## 2019-11-06 RX ADMIN — LISINOPRIL 10 MG: 10 TABLET ORAL at 08:19

## 2019-11-06 RX ADMIN — ENOXAPARIN SODIUM 40 MG: 40 INJECTION SUBCUTANEOUS at 08:19

## 2019-11-06 RX ADMIN — CARVEDILOL 12.5 MG: 6.25 TABLET, FILM COATED ORAL at 13:47

## 2019-11-06 RX ADMIN — Medication 10 ML: at 23:03

## 2019-11-06 RX ADMIN — IOPAMIDOL 60 ML: 755 INJECTION, SOLUTION INTRAVENOUS at 15:17

## 2019-11-06 RX ADMIN — ASPIRIN 81 MG 81 MG: 81 TABLET ORAL at 08:19

## 2019-11-06 ASSESSMENT — PAIN SCALES - GENERAL: PAINLEVEL_OUTOF10: 0

## 2019-11-07 VITALS
DIASTOLIC BLOOD PRESSURE: 84 MMHG | WEIGHT: 259 LBS | BODY MASS INDEX: 35.08 KG/M2 | TEMPERATURE: 98 F | RESPIRATION RATE: 16 BRPM | HEIGHT: 72 IN | SYSTOLIC BLOOD PRESSURE: 132 MMHG | OXYGEN SATURATION: 91 % | HEART RATE: 61 BPM

## 2019-11-07 LAB
LV EF: 55 %
LVEF MODALITY: NORMAL

## 2019-11-07 PROCEDURE — 6370000000 HC RX 637 (ALT 250 FOR IP): Performed by: INTERNAL MEDICINE

## 2019-11-07 PROCEDURE — 93306 TTE W/DOPPLER COMPLETE: CPT

## 2019-11-07 PROCEDURE — 2580000003 HC RX 258: Performed by: INTERNAL MEDICINE

## 2019-11-07 PROCEDURE — 6360000002 HC RX W HCPCS: Performed by: INTERNAL MEDICINE

## 2019-11-07 PROCEDURE — 99232 SBSQ HOSP IP/OBS MODERATE 35: CPT | Performed by: PHYSICIAN ASSISTANT

## 2019-11-07 RX ORDER — ASPIRIN 81 MG/1
81 TABLET, CHEWABLE ORAL DAILY
Qty: 30 TABLET | Refills: 3 | Status: SHIPPED | OUTPATIENT
Start: 2019-11-08 | End: 2022-07-29

## 2019-11-07 RX ORDER — CARVEDILOL 12.5 MG/1
12.5 TABLET ORAL 2 TIMES DAILY WITH MEALS
Qty: 60 TABLET | Refills: 3 | Status: SHIPPED | OUTPATIENT
Start: 2019-11-08 | End: 2019-11-07

## 2019-11-07 RX ORDER — ROSUVASTATIN CALCIUM 10 MG/1
10 TABLET, COATED ORAL NIGHTLY
Qty: 30 TABLET | Refills: 3 | Status: SHIPPED | OUTPATIENT
Start: 2019-11-07 | End: 2019-11-07

## 2019-11-07 RX ORDER — ASPIRIN 81 MG/1
81 TABLET, CHEWABLE ORAL DAILY
Qty: 30 TABLET | Refills: 3 | Status: SHIPPED | OUTPATIENT
Start: 2019-11-08 | End: 2019-11-07

## 2019-11-07 RX ORDER — CARVEDILOL 12.5 MG/1
12.5 TABLET ORAL 2 TIMES DAILY WITH MEALS
Qty: 60 TABLET | Refills: 3 | Status: ON HOLD | OUTPATIENT
Start: 2019-11-08 | End: 2019-12-27 | Stop reason: HOSPADM

## 2019-11-07 RX ORDER — ROSUVASTATIN CALCIUM 10 MG/1
10 TABLET, COATED ORAL NIGHTLY
Qty: 30 TABLET | Refills: 3 | Status: SHIPPED | OUTPATIENT
Start: 2019-11-07 | End: 2020-04-14 | Stop reason: SDUPTHER

## 2019-11-07 RX ADMIN — ENOXAPARIN SODIUM 40 MG: 40 INJECTION SUBCUTANEOUS at 08:14

## 2019-11-07 RX ADMIN — CARVEDILOL 12.5 MG: 6.25 TABLET, FILM COATED ORAL at 18:04

## 2019-11-07 RX ADMIN — Medication 10 ML: at 08:14

## 2019-11-07 RX ADMIN — CARVEDILOL 12.5 MG: 6.25 TABLET, FILM COATED ORAL at 08:14

## 2019-11-07 RX ADMIN — ASPIRIN 81 MG 81 MG: 81 TABLET ORAL at 08:14

## 2019-11-07 RX ADMIN — LISINOPRIL 10 MG: 10 TABLET ORAL at 08:14

## 2019-11-07 ASSESSMENT — PAIN SCALES - GENERAL
PAINLEVEL_OUTOF10: 0

## 2019-11-08 ENCOUNTER — OFFICE VISIT (OUTPATIENT)
Dept: NEUROLOGY | Age: 69
End: 2019-11-08
Payer: MEDICARE

## 2019-11-08 VITALS
HEART RATE: 60 BPM | OXYGEN SATURATION: 96 % | DIASTOLIC BLOOD PRESSURE: 70 MMHG | RESPIRATION RATE: 18 BRPM | HEIGHT: 72 IN | WEIGHT: 255 LBS | SYSTOLIC BLOOD PRESSURE: 130 MMHG | BODY MASS INDEX: 34.54 KG/M2

## 2019-11-08 DIAGNOSIS — R73.09 ELEVATED HEMOGLOBIN A1C: ICD-10-CM

## 2019-11-08 DIAGNOSIS — I63.411 CEREBROVASCULAR ACCIDENT (CVA) DUE TO EMBOLISM OF RIGHT MIDDLE CEREBRAL ARTERY (HCC): Primary | ICD-10-CM

## 2019-11-08 DIAGNOSIS — E78.5 DYSLIPIDEMIA: ICD-10-CM

## 2019-11-08 DIAGNOSIS — E66.9 OBESITY (BMI 30.0-34.9): ICD-10-CM

## 2019-11-08 PROCEDURE — 99215 OFFICE O/P EST HI 40 MIN: CPT | Performed by: NURSE PRACTITIONER

## 2019-11-08 RX ORDER — NITROGLYCERIN 0.4 MG/1
TABLET SUBLINGUAL
Refills: 6 | COMMUNITY
Start: 2019-11-01 | End: 2019-11-08 | Stop reason: ALTCHOICE

## 2019-12-03 ENCOUNTER — OFFICE VISIT (OUTPATIENT)
Dept: FAMILY MEDICINE CLINIC | Age: 69
End: 2019-12-03
Payer: MEDICARE

## 2019-12-03 VITALS
SYSTOLIC BLOOD PRESSURE: 130 MMHG | BODY MASS INDEX: 35.8 KG/M2 | DIASTOLIC BLOOD PRESSURE: 70 MMHG | WEIGHT: 264 LBS | OXYGEN SATURATION: 97 % | HEART RATE: 61 BPM

## 2019-12-03 DIAGNOSIS — I63.9 ACUTE CEREBROVASCULAR ACCIDENT (CVA) (HCC): Primary | ICD-10-CM

## 2019-12-03 DIAGNOSIS — I10 ESSENTIAL HYPERTENSION: ICD-10-CM

## 2019-12-03 DIAGNOSIS — M15.9 PRIMARY OSTEOARTHRITIS INVOLVING MULTIPLE JOINTS: ICD-10-CM

## 2019-12-03 DIAGNOSIS — I20.8 ANGINA OF EFFORT (HCC): ICD-10-CM

## 2019-12-03 PROBLEM — E66.9 OBESITY: Status: ACTIVE | Noted: 2019-06-16

## 2019-12-03 PROBLEM — E78.5 HYPERLIPIDEMIA: Status: ACTIVE | Noted: 2019-06-16

## 2019-12-03 PROBLEM — I20.89 ANGINA OF EFFORT: Status: ACTIVE | Noted: 2019-12-03

## 2019-12-03 PROCEDURE — 99214 OFFICE O/P EST MOD 30 MIN: CPT | Performed by: INTERNAL MEDICINE

## 2019-12-03 RX ORDER — LISINOPRIL 10 MG/1
10 TABLET ORAL DAILY
Qty: 90 TABLET | Refills: 1 | Status: ON HOLD | OUTPATIENT
Start: 2019-12-03 | End: 2019-12-23 | Stop reason: ALTCHOICE

## 2019-12-03 ASSESSMENT — PATIENT HEALTH QUESTIONNAIRE - PHQ9
1. LITTLE INTEREST OR PLEASURE IN DOING THINGS: 0
SUM OF ALL RESPONSES TO PHQ QUESTIONS 1-9: 0
2. FEELING DOWN, DEPRESSED OR HOPELESS: 0
SUM OF ALL RESPONSES TO PHQ9 QUESTIONS 1 & 2: 0
SUM OF ALL RESPONSES TO PHQ QUESTIONS 1-9: 0

## 2019-12-10 ENCOUNTER — HOSPITAL ENCOUNTER (OUTPATIENT)
Dept: CARDIAC CATH/INVASIVE PROCEDURES | Age: 69
Discharge: HOME | End: 2019-12-10
Payer: MEDICARE

## 2019-12-10 VITALS
SYSTOLIC BLOOD PRESSURE: 168 MMHG | BODY MASS INDEX: 35.89 KG/M2 | RESPIRATION RATE: 16 BRPM | TEMPERATURE: 97 F | HEIGHT: 72 IN | WEIGHT: 265 LBS | HEART RATE: 69 BPM | DIASTOLIC BLOOD PRESSURE: 108 MMHG

## 2019-12-10 LAB
ABO/RH: NORMAL
ANTIBODY SCREEN: NORMAL
LV EF: 50 %
LVEF MODALITY: NORMAL

## 2019-12-10 PROCEDURE — C1894 INTRO/SHEATH, NON-LASER: HCPCS

## 2019-12-10 PROCEDURE — 2709999900 HC NON-CHARGEABLE SUPPLY

## 2019-12-10 PROCEDURE — 6360000002 HC RX W HCPCS

## 2019-12-10 PROCEDURE — 86900 BLOOD TYPING SEROLOGIC ABO: CPT

## 2019-12-10 PROCEDURE — C1887 CATHETER, GUIDING: HCPCS

## 2019-12-10 PROCEDURE — C1769 GUIDE WIRE: HCPCS

## 2019-12-10 PROCEDURE — 86850 RBC ANTIBODY SCREEN: CPT

## 2019-12-10 PROCEDURE — 86901 BLOOD TYPING SEROLOGIC RH(D): CPT

## 2019-12-10 PROCEDURE — 93306 TTE W/DOPPLER COMPLETE: CPT

## 2019-12-10 PROCEDURE — 2500000003 HC RX 250 WO HCPCS

## 2019-12-10 PROCEDURE — 36415 COLL VENOUS BLD VENIPUNCTURE: CPT

## 2019-12-10 PROCEDURE — 93458 L HRT ARTERY/VENTRICLE ANGIO: CPT | Performed by: INTERNAL MEDICINE

## 2019-12-17 ENCOUNTER — INITIAL CONSULT (OUTPATIENT)
Dept: CARDIOTHORACIC SURGERY | Age: 69
End: 2019-12-17
Payer: MEDICARE

## 2019-12-17 ENCOUNTER — PREP FOR PROCEDURE (OUTPATIENT)
Dept: CARDIOTHORACIC SURGERY | Age: 69
End: 2019-12-17

## 2019-12-17 VITALS
BODY MASS INDEX: 35.21 KG/M2 | DIASTOLIC BLOOD PRESSURE: 101 MMHG | SYSTOLIC BLOOD PRESSURE: 159 MMHG | WEIGHT: 260 LBS | HEART RATE: 58 BPM | HEIGHT: 72 IN | RESPIRATION RATE: 16 BRPM

## 2019-12-17 DIAGNOSIS — I20.89 ANGINA OF EFFORT: Primary | ICD-10-CM

## 2019-12-17 DIAGNOSIS — I25.119 CORONARY ARTERY DISEASE INVOLVING NATIVE CORONARY ARTERY OF NATIVE HEART WITH ANGINA PECTORIS (HCC): ICD-10-CM

## 2019-12-17 DIAGNOSIS — I25.10 CAD IN NATIVE ARTERY: ICD-10-CM

## 2019-12-17 DIAGNOSIS — I20.0 UNSTABLE ANGINA (HCC): Primary | ICD-10-CM

## 2019-12-17 PROCEDURE — 99204 OFFICE O/P NEW MOD 45 MIN: CPT | Performed by: THORACIC SURGERY (CARDIOTHORACIC VASCULAR SURGERY)

## 2019-12-17 RX ORDER — SODIUM CHLORIDE 0.9 % (FLUSH) 0.9 %
10 SYRINGE (ML) INJECTION EVERY 12 HOURS SCHEDULED
Status: CANCELLED | OUTPATIENT
Start: 2019-12-17

## 2019-12-17 RX ORDER — SODIUM CHLORIDE 9 MG/ML
INJECTION, SOLUTION INTRAVENOUS CONTINUOUS
Status: CANCELLED | OUTPATIENT
Start: 2019-12-17

## 2019-12-17 RX ORDER — CHLORHEXIDINE GLUCONATE 4 G/100ML
SOLUTION TOPICAL ONCE
Status: CANCELLED | OUTPATIENT
Start: 2019-12-17 | End: 2019-12-17

## 2019-12-17 RX ORDER — SODIUM CHLORIDE 0.9 % (FLUSH) 0.9 %
10 SYRINGE (ML) INJECTION PRN
Status: CANCELLED | OUTPATIENT
Start: 2019-12-17

## 2019-12-17 RX ORDER — CHLORHEXIDINE GLUCONATE ORAL RINSE 1.2 MG/ML
15 SOLUTION DENTAL ONCE
Status: CANCELLED | OUTPATIENT
Start: 2019-12-17 | End: 2019-12-17

## 2019-12-17 ASSESSMENT — ENCOUNTER SYMPTOMS
SHORTNESS OF BREATH: 0
ABDOMINAL PAIN: 0
COLOR CHANGE: 0
CHEST TIGHTNESS: 0

## 2019-12-17 NOTE — H&P
Patient ID: Reubin Eisenmenger is a 77 y.o. male. Chief Complaint   Patient presents with    Consultation       consult discuss surgery         HPI   Mr. Gita Ayala is a 60-year-old male recently presenting to office to discuss surgical intervention. His PMH is significant for recent stroke, HTN and HLD. He was recently hospitalized due to abnormal upper extremity motion and was felt to have a stroke by history, was seen by neurology, and had a MRI which confirmed the stroke. He was experiencing progressive chest heaviness and underwent a cardiac catheterization on 12/10/19. He currently denies any CP, SOB, or neurological symptoms.        Past Medical History:   Diagnosis Date    Chronic back pain      Erectile dysfunction      Esophagitis 09/2011     VITO    H/O cardiomegaly       NORMAL ECHO IN 2016    H/O hypogonadism 2012    History of basal cell carcinoma      History of pneumonia 2016    History of sebaceous cyst      Hyperlipidemia      Hypertension      Obesity      Osteoarthritis      Sleep apnea                   Past Surgical History:   Procedure Laterality Date    CARDIAC CATHETERIZATION   12/10/2019     [facility]  CT surgery consult    CYST REMOVAL   2005     VICENTA    ESOPHGOSCOPY   9/10/2011     foreign body removal    KNEE SURGERY   2004     bilateral knee replacements    SHOULDER SURGERY   1997     right rotator cuff    SKIN CANCER EXCISION   2003     CUDDAPAH    VASECTOMY         AGE 33               Family History   Problem Relation Age of Onset    Cancer Mother      Stroke Father      Stroke Sister      No Known Problems Brother      Stroke Sister      No Known Problems Sister      No Known Problems Sister      No Known Problems Sister                 Allergies   Allergen Reactions    Statins Other (See Comments)       Myalgias             Current Outpatient Medications:     Coenzyme Q10 (COQ-10) 200 MG CAPS, Take by mouth, Disp: , Rfl:     Misc Natural Products (OSTEO BI-FLEX TRIPLE STRENGTH PO), Take by mouth, Disp: , Rfl:     lisinopril (PRINIVIL;ZESTRIL) 10 MG tablet, Take 1 tablet by mouth daily, Disp: 90 tablet, Rfl: 1    carvedilol (COREG) 12.5 MG tablet, Take 1 tablet by mouth 2 times daily (with meals), Disp: 60 tablet, Rfl: 3    rosuvastatin (CRESTOR) 10 MG tablet, Take 1 tablet by mouth nightly, Disp: 30 tablet, Rfl: 3    aspirin 81 MG chewable tablet, Take 1 tablet by mouth daily, Disp: 30 tablet, Rfl: 3    sildenafil (REVATIO) 20 MG tablet, Take 1 tablet by mouth daily as needed (erectile dysfunction) 3-5 before intercourse as needed, Disp: 50 tablet, Rfl: 2    lisinopril (PRINIVIL;ZESTRIL) 10 MG tablet, Take 1 tablet by mouth daily. , Disp: 30 tablet, Rfl: 11     Social History            Tobacco Use    Smoking status: Former Smoker       Last attempt to quit:        Years since quittin.9    Smokeless tobacco: Never Used   Substance Use Topics    Alcohol use: Yes       Alcohol/week: 15.0 standard drinks       Types: 15 Shots of liquor per week             Vitals:     19 1009   BP: (!) 159/101   Site: Right Upper Arm   Position: Sitting   Cuff Size: Medium Adult   Pulse: 58   Resp: 16   Weight: 260 lb (117.9 kg)   Height: 6' (1.829 m)         Review of Systems   Constitutional: Negative for fatigue. Respiratory: Negative for chest tightness and shortness of breath. Cardiovascular: Negative for chest pain. Gastrointestinal: Negative for abdominal pain. Skin: Negative for color change. Neurological: Negative for dizziness, syncope, facial asymmetry and light-headedness.         Objective:   Physical Exam  Constitutional:       General: He is not in acute distress. Appearance: Normal appearance. He is normal weight. Neck:      Musculoskeletal: Normal range of motion. Cardiovascular:      Rate and Rhythm: Normal rate. Pulmonary:      Effort: Pulmonary effort is normal.      Breath sounds: Normal breath sounds.

## 2019-12-18 ENCOUNTER — TELEPHONE (OUTPATIENT)
Dept: CARDIOTHORACIC SURGERY | Age: 69
End: 2019-12-18

## 2019-12-19 ENCOUNTER — HOSPITAL ENCOUNTER (OUTPATIENT)
Dept: ULTRASOUND IMAGING | Age: 69
Discharge: HOME | End: 2019-12-21
Payer: MEDICARE

## 2019-12-19 ENCOUNTER — ANESTHESIA EVENT (OUTPATIENT)
Dept: OPERATING ROOM | Age: 69
DRG: 236 | End: 2019-12-19
Payer: MEDICARE

## 2019-12-19 ENCOUNTER — HOSPITAL ENCOUNTER (OUTPATIENT)
Dept: INTERVENTIONAL RADIOLOGY/VASCULAR | Age: 69
Discharge: HOME | End: 2019-12-21
Payer: MEDICARE

## 2019-12-19 ENCOUNTER — HOSPITAL ENCOUNTER (OUTPATIENT)
Dept: GENERAL RADIOLOGY | Age: 69
Discharge: HOME | End: 2019-12-21
Payer: MEDICARE

## 2019-12-19 ENCOUNTER — HOSPITAL ENCOUNTER (OUTPATIENT)
Dept: PREADMISSION TESTING | Age: 69
Discharge: HOME | End: 2019-12-19
Payer: MEDICARE

## 2019-12-19 VITALS
HEART RATE: 64 BPM | DIASTOLIC BLOOD PRESSURE: 86 MMHG | WEIGHT: 265 LBS | BODY MASS INDEX: 35.89 KG/M2 | TEMPERATURE: 97.6 F | RESPIRATION RATE: 20 BRPM | HEIGHT: 72 IN | SYSTOLIC BLOOD PRESSURE: 148 MMHG

## 2019-12-19 DIAGNOSIS — I25.119 CORONARY ARTERY DISEASE INVOLVING NATIVE CORONARY ARTERY OF NATIVE HEART WITH ANGINA PECTORIS (HCC): ICD-10-CM

## 2019-12-19 DIAGNOSIS — I20.8 ANGINA OF EFFORT (HCC): ICD-10-CM

## 2019-12-19 LAB
ABO/RH: NORMAL
ALBUMIN SERPL-MCNC: 3.8 G/DL (ref 3.5–5.2)
ALP BLD-CCNC: 86 U/L (ref 40–129)
ALT SERPL-CCNC: 20 U/L (ref 0–40)
ANION GAP SERPL CALCULATED.3IONS-SCNC: 15 MMOL/L (ref 7–16)
ANTIBODY SCREEN: NORMAL
APTT: 35.7 SEC (ref 24.5–35.1)
AST SERPL-CCNC: 25 U/L (ref 0–39)
BILIRUB SERPL-MCNC: 0.3 MG/DL (ref 0–1.2)
BILIRUBIN URINE: NEGATIVE
BLOOD, URINE: NEGATIVE
BUN BLDV-MCNC: 13 MG/DL (ref 8–23)
CALCIUM SERPL-MCNC: 9.5 MG/DL (ref 8.6–10.2)
CHLORIDE BLD-SCNC: 103 MMOL/L (ref 98–107)
CLARITY: CLEAR
CO2: 25 MMOL/L (ref 22–29)
COLOR: YELLOW
CREAT SERPL-MCNC: 0.8 MG/DL (ref 0.7–1.2)
GFR AFRICAN AMERICAN: >60
GFR NON-AFRICAN AMERICAN: >60 ML/MIN/1.73
GLUCOSE BLD-MCNC: 176 MG/DL (ref 74–99)
GLUCOSE URINE: NEGATIVE MG/DL
HCT VFR BLD CALC: 48.3 % (ref 37–54)
HEMOGLOBIN: 15.7 G/DL (ref 12.5–16.5)
INR BLD: 1
KETONES, URINE: NEGATIVE MG/DL
LEUKOCYTE ESTERASE, URINE: NEGATIVE
MCH RBC QN AUTO: 32.7 PG (ref 26–35)
MCHC RBC AUTO-ENTMCNC: 32.5 % (ref 32–34.5)
MCV RBC AUTO: 100.6 FL (ref 80–99.9)
NITRITE, URINE: NEGATIVE
PDW BLD-RTO: 12.1 FL (ref 11.5–15)
PH UA: 5.5 (ref 5–9)
PLATELET # BLD: 312 E9/L (ref 130–450)
PMV BLD AUTO: 11.3 FL (ref 7–12)
POTASSIUM REFLEX MAGNESIUM: 4.2 MMOL/L (ref 3.5–5)
PROTEIN UA: NEGATIVE MG/DL
PROTHROMBIN TIME: 11.6 SEC (ref 9.3–12.4)
RBC # BLD: 4.8 E12/L (ref 3.8–5.8)
SODIUM BLD-SCNC: 143 MMOL/L (ref 132–146)
SPECIFIC GRAVITY UA: 1.02 (ref 1–1.03)
TOTAL PROTEIN: 7.9 G/DL (ref 6.4–8.3)
UROBILINOGEN, URINE: 0.2 E.U./DL
WBC # BLD: 8.6 E9/L (ref 4.5–11.5)

## 2019-12-19 PROCEDURE — 94729 DIFFUSING CAPACITY: CPT | Performed by: INTERNAL MEDICINE

## 2019-12-19 PROCEDURE — 94726 PLETHYSMOGRAPHY LUNG VOLUMES: CPT | Performed by: INTERNAL MEDICINE

## 2019-12-19 PROCEDURE — 85027 COMPLETE CBC AUTOMATED: CPT

## 2019-12-19 PROCEDURE — 87081 CULTURE SCREEN ONLY: CPT

## 2019-12-19 PROCEDURE — 86850 RBC ANTIBODY SCREEN: CPT

## 2019-12-19 PROCEDURE — 71046 X-RAY EXAM CHEST 2 VIEWS: CPT

## 2019-12-19 PROCEDURE — 93970 EXTREMITY STUDY: CPT

## 2019-12-19 PROCEDURE — 86901 BLOOD TYPING SEROLOGIC RH(D): CPT

## 2019-12-19 PROCEDURE — 36415 COLL VENOUS BLD VENIPUNCTURE: CPT

## 2019-12-19 PROCEDURE — 94729 DIFFUSING CAPACITY: CPT

## 2019-12-19 PROCEDURE — 81003 URINALYSIS AUTO W/O SCOPE: CPT

## 2019-12-19 PROCEDURE — 86900 BLOOD TYPING SEROLOGIC ABO: CPT

## 2019-12-19 PROCEDURE — 86923 COMPATIBILITY TEST ELECTRIC: CPT

## 2019-12-19 PROCEDURE — 94010 BREATHING CAPACITY TEST: CPT | Performed by: INTERNAL MEDICINE

## 2019-12-19 PROCEDURE — 87088 URINE BACTERIA CULTURE: CPT

## 2019-12-19 PROCEDURE — 93880 EXTRACRANIAL BILAT STUDY: CPT

## 2019-12-19 PROCEDURE — 85610 PROTHROMBIN TIME: CPT

## 2019-12-19 PROCEDURE — 80053 COMPREHEN METABOLIC PANEL: CPT

## 2019-12-19 PROCEDURE — 94375 RESPIRATORY FLOW VOLUME LOOP: CPT

## 2019-12-19 PROCEDURE — 85730 THROMBOPLASTIN TIME PARTIAL: CPT

## 2019-12-19 PROCEDURE — 93922 UPR/L XTREMITY ART 2 LEVELS: CPT

## 2019-12-20 ENCOUNTER — HOSPITAL ENCOUNTER (OUTPATIENT)
Dept: PREADMISSION TESTING | Age: 69
Discharge: HOME | End: 2019-12-20

## 2019-12-21 LAB — MRSA CULTURE ONLY: NORMAL

## 2019-12-22 LAB — URINE CULTURE, ROUTINE: NORMAL

## 2019-12-23 ENCOUNTER — APPOINTMENT (OUTPATIENT)
Dept: GENERAL RADIOLOGY | Age: 69
DRG: 236 | End: 2019-12-23
Attending: THORACIC SURGERY (CARDIOTHORACIC VASCULAR SURGERY)
Payer: MEDICARE

## 2019-12-23 ENCOUNTER — HOSPITAL ENCOUNTER (INPATIENT)
Age: 69
LOS: 4 days | Discharge: HOME HEALTH | DRG: 236 | End: 2019-12-27
Attending: THORACIC SURGERY (CARDIOTHORACIC VASCULAR SURGERY) | Admitting: THORACIC SURGERY (CARDIOTHORACIC VASCULAR SURGERY)
Payer: MEDICARE

## 2019-12-23 ENCOUNTER — ANESTHESIA (OUTPATIENT)
Dept: OPERATING ROOM | Age: 69
DRG: 236 | End: 2019-12-23
Payer: MEDICARE

## 2019-12-23 VITALS — OXYGEN SATURATION: 99 % | TEMPERATURE: 97.9 F

## 2019-12-23 PROBLEM — I25.10 CAD IN NATIVE ARTERY: Status: ACTIVE | Noted: 2019-12-23

## 2019-12-23 LAB
AADO2: 209.3 MMHG
AADO2: 257.3 MMHG
AADO2: 267.9 MMHG
ACTIVATED CLOTTING TIME: 107 SECONDS (ref 99–130)
ACTIVATED CLOTTING TIME: 125 SECONDS (ref 99–130)
ACTIVATED CLOTTING TIME: 433 SECONDS (ref 99–130)
ACTIVATED CLOTTING TIME: 503 SECONDS (ref 99–130)
ACTIVATED CLOTTING TIME: 509 SECONDS (ref 99–130)
ANION GAP SERPL CALCULATED.3IONS-SCNC: 11 MMOL/L (ref 7–16)
APTT: 30.4 SEC (ref 24.5–35.1)
B.E.: -1.1 MMOL/L (ref -3–0)
B.E.: -1.8 MMOL/L (ref -3–3)
B.E.: -2.8 MMOL/L (ref -3–3)
B.E.: -3.1 MMOL/L (ref -3–3)
B.E.: 0 MMOL/L (ref -3–0)
B.E.: 0.6 MMOL/L (ref -3–0)
BUN BLDV-MCNC: 15 MG/DL (ref 8–23)
CALCIUM SERPL-MCNC: 9.1 MG/DL (ref 8.6–10.2)
CARDIOPULMONARY BYPASS: NO
CARDIOPULMONARY BYPASS: YES
CARDIOPULMONARY BYPASS: YES
CHLORIDE BLD-SCNC: 103 MMOL/L (ref 98–107)
CO2: 23 MMOL/L (ref 22–29)
COHB: 1.3 % (ref 0–1.5)
COHB: 1.3 % (ref 0–1.5)
COHB: 1.4 % (ref 0–1.5)
CREAT SERPL-MCNC: 0.8 MG/DL (ref 0.7–1.2)
CRITICAL: ABNORMAL
CRITICAL: ABNORMAL
CRITICAL: NORMAL
DATE ANALYZED: ABNORMAL
DATE ANALYZED: ABNORMAL
DATE ANALYZED: NORMAL
DATE OF COLLECTION: ABNORMAL
DATE OF COLLECTION: ABNORMAL
DATE OF COLLECTION: NORMAL
DEVICE: ABNORMAL
FIO2: 50 %
FIO2: 60 %
FIO2: 60 %
GFR AFRICAN AMERICAN: >60
GFR NON-AFRICAN AMERICAN: >60 ML/MIN/1.73
GLUCOSE BLD-MCNC: 122 MG/DL (ref 74–99)
HCO3 ARTERIAL: 24.5 MMOL/L (ref 22–26)
HCO3 ARTERIAL: 25 MMOL/L (ref 22–26)
HCO3 ARTERIAL: 25.1 MMOL/L (ref 22–26)
HCO3: 22.6 MMOL/L (ref 22–26)
HCO3: 23.5 MMOL/L (ref 22–26)
HCO3: 23.5 MMOL/L (ref 22–26)
HCT (EST): 36 % (ref 37–54)
HCT (EST): 38 % (ref 37–54)
HCT (EST): 39 % (ref 37–54)
HCT VFR BLD CALC: 41.4 % (ref 37–54)
HEMOGLOBIN: 13.3 G/DL (ref 12.5–16.5)
HGB, (EST): 12.1 G/DL (ref 12.5–15.5)
HGB, (EST): 12.9 G/DL (ref 12.5–15.5)
HGB, (EST): 13.3 G/DL (ref 12.5–15.5)
HHB: 2.3 % (ref 0–5)
HHB: 3.3 % (ref 0–5)
HHB: 3.7 % (ref 0–5)
INR BLD: 1.2
LAB: ABNORMAL
LAB: ABNORMAL
LAB: NORMAL
Lab: ABNORMAL
Lab: ABNORMAL
Lab: NORMAL
MAGNESIUM: 2.4 MG/DL (ref 1.6–2.6)
MCH RBC QN AUTO: 32.9 PG (ref 26–35)
MCHC RBC AUTO-ENTMCNC: 32.1 % (ref 32–34.5)
MCV RBC AUTO: 102.5 FL (ref 80–99.9)
METER GLUCOSE: 117 MG/DL (ref 74–99)
METER GLUCOSE: 119 MG/DL (ref 74–99)
METER GLUCOSE: 129 MG/DL (ref 74–99)
METER GLUCOSE: 131 MG/DL (ref 74–99)
METER GLUCOSE: 141 MG/DL (ref 74–99)
METER GLUCOSE: 154 MG/DL (ref 74–99)
METHB: 0.4 % (ref 0–1.5)
METHB: 0.4 % (ref 0–1.5)
METHB: 0.5 % (ref 0–1.5)
MODE: ABNORMAL
MODE: AC
MODE: NORMAL
O2 CONTENT: 18.3 ML/DL
O2 CONTENT: 20.6 ML/DL
O2 CONTENT: 20.8 ML/DL
O2 SATURATION: 100 % (ref 92–98.5)
O2 SATURATION: 100 % (ref 92–98.5)
O2 SATURATION: 91.1 % (ref 92–98.5)
O2 SATURATION: 96.2 % (ref 92–98.5)
O2 SATURATION: 96.6 % (ref 92–98.5)
O2 SATURATION: 97.7 % (ref 92–98.5)
O2HB: 94.5 % (ref 94–97)
O2HB: 95 % (ref 94–97)
O2HB: 95.9 % (ref 94–97)
OPERATOR ID: 1926
OPERATOR ID: 5721
OPERATOR ID: ABNORMAL
PATIENT TEMP: 37 C
PCO2 ARTERIAL: 36 MMHG (ref 35–45)
PCO2 ARTERIAL: 41.8 MMHG (ref 35–45)
PCO2 ARTERIAL: 46.2 MMHG (ref 35–45)
PCO2: 41.6 MMHG (ref 35–45)
PCO2: 43 MMHG (ref 35–45)
PCO2: 46.3 MMHG (ref 35–45)
PDW BLD-RTO: 12.2 FL (ref 11.5–15)
PEEP/CPAP: 6 CMH2O
PEEP/CPAP: 8 CMH2O
PEEP/CPAP: 8 CMH2O
PFO2: 1.57 MMHG/%
PFO2: 1.76 MMHG/%
PFO2: 1.8 MMHG/%
PH BLOOD GAS: 7.32 (ref 7.35–7.45)
PH BLOOD GAS: 7.34 (ref 7.35–7.45)
PH BLOOD GAS: 7.34 (ref 7.35–7.45)
PH BLOOD GAS: 7.37 (ref 7.35–7.45)
PH BLOOD GAS: 7.39 (ref 7.35–7.45)
PH BLOOD GAS: 7.44 (ref 7.35–7.45)
PIP: 12 CMH2O
PLATELET # BLD: 197 E9/L (ref 130–450)
PMV BLD AUTO: 10.8 FL (ref 7–12)
PO2 ARTERIAL: 427.4 MMHG (ref 60–80)
PO2 ARTERIAL: 463.6 MMHG (ref 60–80)
PO2 ARTERIAL: 65.3 MMHG (ref 60–80)
PO2: 108.2 MMHG (ref 60–100)
PO2: 87.9 MMHG (ref 60–100)
PO2: 94 MMHG (ref 60–100)
POTASSIUM SERPL-SCNC: 4.08 MMOL/L (ref 3.3–5.1)
POTASSIUM SERPL-SCNC: 4.17 MMOL/L (ref 3.3–5.1)
POTASSIUM SERPL-SCNC: 4.24 MMOL/L (ref 3.3–5.1)
POTASSIUM SERPL-SCNC: 4.3 MMOL/L (ref 3.5–5)
POTASSIUM SERPL-SCNC: 4.4 MMOL/L (ref 3.5–5.5)
POTASSIUM SERPL-SCNC: 5 MMOL/L (ref 3.5–5.5)
PROTHROMBIN TIME: 13.6 SEC (ref 9.3–12.4)
PS: 10 CMH20
RBC # BLD: 4.04 E12/L (ref 3.8–5.8)
RI(T): 2.38
RI(T): 2.38
RI(T): 2.85
RR MECHANICAL: 14 B/MIN
SODIUM BLD-SCNC: 137 MMOL/L (ref 132–146)
SOURCE, BLOOD GAS: ABNORMAL
SOURCE, BLOOD GAS: NORMAL
THB: 13.7 G/DL (ref 11.5–16.5)
THB: 15.2 G/DL (ref 11.5–16.5)
THB: 15.5 G/DL (ref 11.5–16.5)
TIME ANALYZED: 1328
TIME ANALYZED: 1449
TIME ANALYZED: 1730
VT MECHANICAL: 500 ML
WBC # BLD: 14.9 E9/L (ref 4.5–11.5)

## 2019-12-23 PROCEDURE — 3700000000 HC ANESTHESIA ATTENDED CARE: Performed by: THORACIC SURGERY (CARDIOTHORACIC VASCULAR SURGERY)

## 2019-12-23 PROCEDURE — 83735 ASSAY OF MAGNESIUM: CPT

## 2019-12-23 PROCEDURE — 84132 ASSAY OF SERUM POTASSIUM: CPT

## 2019-12-23 PROCEDURE — 2580000003 HC RX 258: Performed by: THORACIC SURGERY (CARDIOTHORACIC VASCULAR SURGERY)

## 2019-12-23 PROCEDURE — 2580000003 HC RX 258: Performed by: PHYSICIAN ASSISTANT

## 2019-12-23 PROCEDURE — 3600000018 HC SURGERY OHS ADDTL 15MIN: Performed by: THORACIC SURGERY (CARDIOTHORACIC VASCULAR SURGERY)

## 2019-12-23 PROCEDURE — 2580000003 HC RX 258: Performed by: NURSE ANESTHETIST, CERTIFIED REGISTERED

## 2019-12-23 PROCEDURE — 33508 ENDOSCOPIC VEIN HARVEST: CPT | Performed by: THORACIC SURGERY (CARDIOTHORACIC VASCULAR SURGERY)

## 2019-12-23 PROCEDURE — 6360000002 HC RX W HCPCS: Performed by: NURSE PRACTITIONER

## 2019-12-23 PROCEDURE — 82805 BLOOD GASES W/O2 SATURATION: CPT

## 2019-12-23 PROCEDURE — 3600000008 HC SURGERY OHS BASE: Performed by: THORACIC SURGERY (CARDIOTHORACIC VASCULAR SURGERY)

## 2019-12-23 PROCEDURE — 2709999900 HC NON-CHARGEABLE SUPPLY: Performed by: THORACIC SURGERY (CARDIOTHORACIC VASCULAR SURGERY)

## 2019-12-23 PROCEDURE — P9045 ALBUMIN (HUMAN), 5%, 250 ML: HCPCS | Performed by: NURSE PRACTITIONER

## 2019-12-23 PROCEDURE — 82962 GLUCOSE BLOOD TEST: CPT

## 2019-12-23 PROCEDURE — 7100000000 HC PACU RECOVERY - FIRST 15 MIN

## 2019-12-23 PROCEDURE — C9113 INJ PANTOPRAZOLE SODIUM, VIA: HCPCS | Performed by: NURSE PRACTITIONER

## 2019-12-23 PROCEDURE — 06BN4ZZ EXCISION OF LEFT FEMORAL VEIN, PERCUTANEOUS ENDOSCOPIC APPROACH: ICD-10-PCS | Performed by: THORACIC SURGERY (CARDIOTHORACIC VASCULAR SURGERY)

## 2019-12-23 PROCEDURE — 94640 AIRWAY INHALATION TREATMENT: CPT

## 2019-12-23 PROCEDURE — 6370000000 HC RX 637 (ALT 250 FOR IP): Performed by: NURSE ANESTHETIST, CERTIFIED REGISTERED

## 2019-12-23 PROCEDURE — 82803 BLOOD GASES ANY COMBINATION: CPT

## 2019-12-23 PROCEDURE — 33518 CABG ARTERY-VEIN TWO: CPT | Performed by: THORACIC SURGERY (CARDIOTHORACIC VASCULAR SURGERY)

## 2019-12-23 PROCEDURE — 94660 CPAP INITIATION&MGMT: CPT

## 2019-12-23 PROCEDURE — 3700000001 HC ADD 15 MINUTES (ANESTHESIA): Performed by: THORACIC SURGERY (CARDIOTHORACIC VASCULAR SURGERY)

## 2019-12-23 PROCEDURE — 5A1221Z PERFORMANCE OF CARDIAC OUTPUT, CONTINUOUS: ICD-10-PCS | Performed by: THORACIC SURGERY (CARDIOTHORACIC VASCULAR SURGERY)

## 2019-12-23 PROCEDURE — 6360000002 HC RX W HCPCS: Performed by: THORACIC SURGERY (CARDIOTHORACIC VASCULAR SURGERY)

## 2019-12-23 PROCEDURE — 02100Z9 BYPASS CORONARY ARTERY, ONE ARTERY FROM LEFT INTERNAL MAMMARY, OPEN APPROACH: ICD-10-PCS | Performed by: THORACIC SURGERY (CARDIOTHORACIC VASCULAR SURGERY)

## 2019-12-23 PROCEDURE — 2720000010 HC SURG SUPPLY STERILE: Performed by: THORACIC SURGERY (CARDIOTHORACIC VASCULAR SURGERY)

## 2019-12-23 PROCEDURE — 85610 PROTHROMBIN TIME: CPT

## 2019-12-23 PROCEDURE — 80048 BASIC METABOLIC PNL TOTAL CA: CPT

## 2019-12-23 PROCEDURE — P9041 ALBUMIN (HUMAN),5%, 50ML: HCPCS | Performed by: NURSE ANESTHETIST, CERTIFIED REGISTERED

## 2019-12-23 PROCEDURE — 2000000000 HC ICU R&B

## 2019-12-23 PROCEDURE — 6360000002 HC RX W HCPCS: Performed by: PHYSICIAN ASSISTANT

## 2019-12-23 PROCEDURE — 85730 THROMBOPLASTIN TIME PARTIAL: CPT

## 2019-12-23 PROCEDURE — 85027 COMPLETE CBC AUTOMATED: CPT

## 2019-12-23 PROCEDURE — 6360000002 HC RX W HCPCS: Performed by: NURSE ANESTHETIST, CERTIFIED REGISTERED

## 2019-12-23 PROCEDURE — 021109W BYPASS CORONARY ARTERY, TWO ARTERIES FROM AORTA WITH AUTOLOGOUS VENOUS TISSUE, OPEN APPROACH: ICD-10-PCS | Performed by: THORACIC SURGERY (CARDIOTHORACIC VASCULAR SURGERY)

## 2019-12-23 PROCEDURE — 2580000003 HC RX 258: Performed by: NURSE PRACTITIONER

## 2019-12-23 PROCEDURE — B24BZZ4 ULTRASONOGRAPHY OF HEART WITH AORTA, TRANSESOPHAGEAL: ICD-10-PCS | Performed by: THORACIC SURGERY (CARDIOTHORACIC VASCULAR SURGERY)

## 2019-12-23 PROCEDURE — 71045 X-RAY EXAM CHEST 1 VIEW: CPT

## 2019-12-23 PROCEDURE — 94002 VENT MGMT INPAT INIT DAY: CPT

## 2019-12-23 PROCEDURE — 85347 COAGULATION TIME ACTIVATED: CPT

## 2019-12-23 PROCEDURE — 6370000000 HC RX 637 (ALT 250 FOR IP): Performed by: NURSE PRACTITIONER

## 2019-12-23 PROCEDURE — 33533 CABG ARTERIAL SINGLE: CPT | Performed by: THORACIC SURGERY (CARDIOTHORACIC VASCULAR SURGERY)

## 2019-12-23 PROCEDURE — 6370000000 HC RX 637 (ALT 250 FOR IP): Performed by: PHYSICIAN ASSISTANT

## 2019-12-23 PROCEDURE — 2500000003 HC RX 250 WO HCPCS: Performed by: NURSE ANESTHETIST, CERTIFIED REGISTERED

## 2019-12-23 PROCEDURE — 36415 COLL VENOUS BLD VENIPUNCTURE: CPT

## 2019-12-23 PROCEDURE — C1713 ANCHOR/SCREW BN/BN,TIS/BN: HCPCS | Performed by: THORACIC SURGERY (CARDIOTHORACIC VASCULAR SURGERY)

## 2019-12-23 DEVICE — IMPLANTABLE DEVICE: Type: IMPLANTABLE DEVICE | Status: FUNCTIONAL

## 2019-12-23 RX ORDER — SODIUM CHLORIDE 0.9 % (FLUSH) 0.9 %
10 SYRINGE (ML) INJECTION PRN
Status: DISCONTINUED | OUTPATIENT
Start: 2019-12-23 | End: 2019-12-27 | Stop reason: HOSPADM

## 2019-12-23 RX ORDER — ACETAMINOPHEN 650 MG/1
650 SUPPOSITORY RECTAL EVERY 4 HOURS PRN
Status: DISCONTINUED | OUTPATIENT
Start: 2019-12-23 | End: 2019-12-24

## 2019-12-23 RX ORDER — SODIUM CHLORIDE 0.9 % (FLUSH) 0.9 %
10 SYRINGE (ML) INJECTION EVERY 12 HOURS SCHEDULED
Status: DISCONTINUED | OUTPATIENT
Start: 2019-12-23 | End: 2019-12-27 | Stop reason: HOSPADM

## 2019-12-23 RX ORDER — INSULIN GLARGINE 100 [IU]/ML
0.15 INJECTION, SOLUTION SUBCUTANEOUS NIGHTLY
Status: DISCONTINUED | OUTPATIENT
Start: 2019-12-24 | End: 2019-12-24

## 2019-12-23 RX ORDER — ALBUMIN, HUMAN INJ 5% 5 %
SOLUTION INTRAVENOUS PRN
Status: DISCONTINUED | OUTPATIENT
Start: 2019-12-23 | End: 2019-12-23 | Stop reason: SDUPTHER

## 2019-12-23 RX ORDER — IPRATROPIUM BROMIDE AND ALBUTEROL SULFATE 2.5; .5 MG/3ML; MG/3ML
1 SOLUTION RESPIRATORY (INHALATION)
Status: DISCONTINUED | OUTPATIENT
Start: 2019-12-23 | End: 2019-12-27 | Stop reason: HOSPADM

## 2019-12-23 RX ORDER — TAMSULOSIN HYDROCHLORIDE 0.4 MG/1
0.4 CAPSULE ORAL DAILY
Status: DISCONTINUED | OUTPATIENT
Start: 2019-12-24 | End: 2019-12-27 | Stop reason: HOSPADM

## 2019-12-23 RX ORDER — ONDANSETRON 2 MG/ML
4 INJECTION INTRAMUSCULAR; INTRAVENOUS EVERY 8 HOURS PRN
Status: DISCONTINUED | OUTPATIENT
Start: 2019-12-23 | End: 2019-12-24

## 2019-12-23 RX ORDER — CHLORHEXIDINE GLUCONATE 0.12 MG/ML
15 RINSE ORAL ONCE
Status: COMPLETED | OUTPATIENT
Start: 2019-12-23 | End: 2019-12-23

## 2019-12-23 RX ORDER — MIDAZOLAM HYDROCHLORIDE 1 MG/ML
INJECTION INTRAMUSCULAR; INTRAVENOUS PRN
Status: DISCONTINUED | OUTPATIENT
Start: 2019-12-23 | End: 2019-12-23 | Stop reason: SDUPTHER

## 2019-12-23 RX ORDER — CEFAZOLIN SODIUM 2 G/50ML
2 SOLUTION INTRAVENOUS
Status: COMPLETED | OUTPATIENT
Start: 2019-12-23 | End: 2019-12-23

## 2019-12-23 RX ORDER — DEXTROSE MONOHYDRATE 50 MG/ML
100 INJECTION, SOLUTION INTRAVENOUS PRN
Status: DISCONTINUED | OUTPATIENT
Start: 2019-12-23 | End: 2019-12-27 | Stop reason: HOSPADM

## 2019-12-23 RX ORDER — ASPIRIN 300 MG/1
300 SUPPOSITORY RECTAL ONCE
Status: COMPLETED | OUTPATIENT
Start: 2019-12-23 | End: 2019-12-23

## 2019-12-23 RX ORDER — FENTANYL CITRATE 50 UG/ML
50 INJECTION, SOLUTION INTRAMUSCULAR; INTRAVENOUS
Status: DISCONTINUED | OUTPATIENT
Start: 2019-12-23 | End: 2019-12-24

## 2019-12-23 RX ORDER — SODIUM CHLORIDE 9 MG/ML
INJECTION, SOLUTION INTRAVENOUS CONTINUOUS
Status: DISCONTINUED | OUTPATIENT
Start: 2019-12-23 | End: 2019-12-23

## 2019-12-23 RX ORDER — VASOPRESSIN 20 U/ML
INJECTION PARENTERAL PRN
Status: DISCONTINUED | OUTPATIENT
Start: 2019-12-23 | End: 2019-12-23 | Stop reason: SDUPTHER

## 2019-12-23 RX ORDER — AMINOCAPROIC ACID 250 MG/ML
INJECTION, SOLUTION INTRAVENOUS PRN
Status: DISCONTINUED | OUTPATIENT
Start: 2019-12-23 | End: 2019-12-23 | Stop reason: SDUPTHER

## 2019-12-23 RX ORDER — SODIUM CHLORIDE 0.9 % (FLUSH) 0.9 %
10 SYRINGE (ML) INJECTION PRN
Status: DISCONTINUED | OUTPATIENT
Start: 2019-12-23 | End: 2019-12-23 | Stop reason: HOSPADM

## 2019-12-23 RX ORDER — OXYCODONE HYDROCHLORIDE 5 MG/1
5 TABLET ORAL EVERY 4 HOURS PRN
Status: DISCONTINUED | OUTPATIENT
Start: 2019-12-23 | End: 2019-12-24

## 2019-12-23 RX ORDER — PANTOPRAZOLE SODIUM 40 MG/10ML
40 INJECTION, POWDER, LYOPHILIZED, FOR SOLUTION INTRAVENOUS DAILY
Status: COMPLETED | OUTPATIENT
Start: 2019-12-23 | End: 2019-12-23

## 2019-12-23 RX ORDER — HEPARIN SODIUM 10000 [USP'U]/ML
INJECTION, SOLUTION INTRAVENOUS; SUBCUTANEOUS PRN
Status: DISCONTINUED | OUTPATIENT
Start: 2019-12-23 | End: 2019-12-23 | Stop reason: SDUPTHER

## 2019-12-23 RX ORDER — NEOSTIGMINE METHYLSULFATE 1 MG/ML
INJECTION, SOLUTION INTRAVENOUS PRN
Status: DISCONTINUED | OUTPATIENT
Start: 2019-12-23 | End: 2019-12-23 | Stop reason: SDUPTHER

## 2019-12-23 RX ORDER — SODIUM CHLORIDE 0.9 % (FLUSH) 0.9 %
10 SYRINGE (ML) INJECTION EVERY 12 HOURS SCHEDULED
Status: DISCONTINUED | OUTPATIENT
Start: 2019-12-23 | End: 2019-12-23 | Stop reason: HOSPADM

## 2019-12-23 RX ORDER — 0.9 % SODIUM CHLORIDE 0.9 %
250 INTRAVENOUS SOLUTION INTRAVENOUS CONTINUOUS PRN
Status: DISCONTINUED | OUTPATIENT
Start: 2019-12-23 | End: 2019-12-24

## 2019-12-23 RX ORDER — MAGNESIUM SULFATE IN WATER 40 MG/ML
2 INJECTION, SOLUTION INTRAVENOUS PRN
Status: DISCONTINUED | OUTPATIENT
Start: 2019-12-23 | End: 2019-12-24

## 2019-12-23 RX ORDER — ROSUVASTATIN CALCIUM 10 MG/1
10 TABLET, COATED ORAL NIGHTLY
Status: DISCONTINUED | OUTPATIENT
Start: 2019-12-23 | End: 2019-12-27 | Stop reason: HOSPADM

## 2019-12-23 RX ORDER — KETOROLAC TROMETHAMINE 30 MG/ML
15 INJECTION, SOLUTION INTRAMUSCULAR; INTRAVENOUS EVERY 6 HOURS PRN
Status: DISCONTINUED | OUTPATIENT
Start: 2019-12-23 | End: 2019-12-27 | Stop reason: HOSPADM

## 2019-12-23 RX ORDER — VECURONIUM BROMIDE 1 MG/ML
INJECTION, POWDER, LYOPHILIZED, FOR SOLUTION INTRAVENOUS PRN
Status: DISCONTINUED | OUTPATIENT
Start: 2019-12-23 | End: 2019-12-23 | Stop reason: SDUPTHER

## 2019-12-23 RX ORDER — ALBUMIN, HUMAN INJ 5% 5 %
25 SOLUTION INTRAVENOUS PRN
Status: DISCONTINUED | OUTPATIENT
Start: 2019-12-23 | End: 2019-12-24

## 2019-12-23 RX ORDER — ETOMIDATE 2 MG/ML
INJECTION INTRAVENOUS PRN
Status: DISCONTINUED | OUTPATIENT
Start: 2019-12-23 | End: 2019-12-23 | Stop reason: SDUPTHER

## 2019-12-23 RX ORDER — NICOTINE POLACRILEX 4 MG
15 LOZENGE BUCCAL PRN
Status: DISCONTINUED | OUTPATIENT
Start: 2019-12-23 | End: 2019-12-27 | Stop reason: HOSPADM

## 2019-12-23 RX ORDER — CEFAZOLIN SODIUM 1 G/3ML
INJECTION, POWDER, FOR SOLUTION INTRAMUSCULAR; INTRAVENOUS PRN
Status: DISCONTINUED | OUTPATIENT
Start: 2019-12-23 | End: 2019-12-23 | Stop reason: SDUPTHER

## 2019-12-23 RX ORDER — ACETAMINOPHEN 325 MG/1
650 TABLET ORAL EVERY 4 HOURS PRN
Status: DISCONTINUED | OUTPATIENT
Start: 2019-12-23 | End: 2019-12-24

## 2019-12-23 RX ORDER — SODIUM CHLORIDE, SODIUM LACTATE, POTASSIUM CHLORIDE, CALCIUM CHLORIDE 600; 310; 30; 20 MG/100ML; MG/100ML; MG/100ML; MG/100ML
INJECTION, SOLUTION INTRAVENOUS CONTINUOUS PRN
Status: DISCONTINUED | OUTPATIENT
Start: 2019-12-23 | End: 2019-12-23 | Stop reason: SDUPTHER

## 2019-12-23 RX ORDER — PROPOFOL 10 MG/ML
INJECTION, EMULSION INTRAVENOUS
Status: DISPENSED
Start: 2019-12-23 | End: 2019-12-23

## 2019-12-23 RX ORDER — CLOPIDOGREL BISULFATE 75 MG/1
75 TABLET ORAL DAILY
Status: DISCONTINUED | OUTPATIENT
Start: 2019-12-24 | End: 2019-12-27 | Stop reason: HOSPADM

## 2019-12-23 RX ORDER — PHENYLEPHRINE HYDROCHLORIDE 10 MG/ML
INJECTION INTRAVENOUS PRN
Status: DISCONTINUED | OUTPATIENT
Start: 2019-12-23 | End: 2019-12-23 | Stop reason: SDUPTHER

## 2019-12-23 RX ORDER — ASPIRIN 81 MG/1
81 TABLET ORAL DAILY
Status: DISCONTINUED | OUTPATIENT
Start: 2019-12-24 | End: 2019-12-24

## 2019-12-23 RX ORDER — OXYCODONE HYDROCHLORIDE 5 MG/1
10 TABLET ORAL EVERY 4 HOURS PRN
Status: DISCONTINUED | OUTPATIENT
Start: 2019-12-23 | End: 2019-12-24

## 2019-12-23 RX ORDER — POTASSIUM CHLORIDE 29.8 MG/ML
20 INJECTION INTRAVENOUS PRN
Status: DISCONTINUED | OUTPATIENT
Start: 2019-12-23 | End: 2019-12-24

## 2019-12-23 RX ORDER — GLYCOPYRROLATE 1 MG/5 ML
SYRINGE (ML) INTRAVENOUS PRN
Status: DISCONTINUED | OUTPATIENT
Start: 2019-12-23 | End: 2019-12-23 | Stop reason: SDUPTHER

## 2019-12-23 RX ORDER — CHLORHEXIDINE GLUCONATE 0.12 MG/ML
15 RINSE ORAL 2 TIMES DAILY
Status: DISCONTINUED | OUTPATIENT
Start: 2019-12-23 | End: 2019-12-23 | Stop reason: ALTCHOICE

## 2019-12-23 RX ORDER — PROTAMINE SULFATE 10 MG/ML
INJECTION, SOLUTION INTRAVENOUS PRN
Status: DISCONTINUED | OUTPATIENT
Start: 2019-12-23 | End: 2019-12-23 | Stop reason: SDUPTHER

## 2019-12-23 RX ORDER — MAGNESIUM HYDROXIDE/ALUMINUM HYDROXICE/SIMETHICONE 120; 1200; 1200 MG/30ML; MG/30ML; MG/30ML
30 SUSPENSION ORAL EVERY 4 HOURS PRN
Status: DISCONTINUED | OUTPATIENT
Start: 2019-12-23 | End: 2019-12-24

## 2019-12-23 RX ORDER — MEPERIDINE HYDROCHLORIDE 50 MG/ML
25 INJECTION INTRAMUSCULAR; INTRAVENOUS; SUBCUTANEOUS
Status: ACTIVE | OUTPATIENT
Start: 2019-12-23 | End: 2019-12-23

## 2019-12-23 RX ORDER — PROPOFOL 10 MG/ML
10 INJECTION, EMULSION INTRAVENOUS CONTINUOUS PRN
Status: DISCONTINUED | OUTPATIENT
Start: 2019-12-23 | End: 2019-12-23 | Stop reason: ALTCHOICE

## 2019-12-23 RX ORDER — SODIUM CHLORIDE 9 MG/ML
30 INJECTION, SOLUTION INTRAVENOUS CONTINUOUS
Status: DISCONTINUED | OUTPATIENT
Start: 2019-12-23 | End: 2019-12-24

## 2019-12-23 RX ORDER — DEXTROSE MONOHYDRATE 25 G/50ML
12.5 INJECTION, SOLUTION INTRAVENOUS PRN
Status: DISCONTINUED | OUTPATIENT
Start: 2019-12-23 | End: 2019-12-27 | Stop reason: HOSPADM

## 2019-12-23 RX ORDER — DOCUSATE SODIUM 100 MG/1
100 CAPSULE, LIQUID FILLED ORAL 2 TIMES DAILY
Status: DISCONTINUED | OUTPATIENT
Start: 2019-12-23 | End: 2019-12-24 | Stop reason: SDUPTHER

## 2019-12-23 RX ORDER — CHLORHEXIDINE GLUCONATE 4 G/100ML
SOLUTION TOPICAL ONCE
Status: COMPLETED | OUTPATIENT
Start: 2019-12-23 | End: 2019-12-23

## 2019-12-23 RX ORDER — FENTANYL CITRATE 50 UG/ML
25 INJECTION, SOLUTION INTRAMUSCULAR; INTRAVENOUS
Status: DISCONTINUED | OUTPATIENT
Start: 2019-12-23 | End: 2019-12-24

## 2019-12-23 RX ORDER — ALBUMIN, HUMAN INJ 5% 5 %
SOLUTION INTRAVENOUS
Status: DISPENSED
Start: 2019-12-23 | End: 2019-12-23

## 2019-12-23 RX ORDER — FENTANYL CITRATE 50 UG/ML
INJECTION, SOLUTION INTRAMUSCULAR; INTRAVENOUS PRN
Status: DISCONTINUED | OUTPATIENT
Start: 2019-12-23 | End: 2019-12-23 | Stop reason: SDUPTHER

## 2019-12-23 RX ORDER — PANTOPRAZOLE SODIUM 40 MG/1
40 TABLET, DELAYED RELEASE ORAL DAILY
Status: DISCONTINUED | OUTPATIENT
Start: 2019-12-24 | End: 2019-12-24

## 2019-12-23 RX ORDER — SENNA PLUS 8.6 MG/1
1 TABLET ORAL NIGHTLY
Status: DISCONTINUED | OUTPATIENT
Start: 2019-12-24 | End: 2019-12-27 | Stop reason: HOSPADM

## 2019-12-23 RX ORDER — SODIUM CHLORIDE 9 MG/ML
10 INJECTION INTRAVENOUS DAILY
Status: COMPLETED | OUTPATIENT
Start: 2019-12-23 | End: 2019-12-23

## 2019-12-23 RX ORDER — CALCIUM CHLORIDE 100 MG/ML
INJECTION INTRAVENOUS; INTRAVENTRICULAR PRN
Status: DISCONTINUED | OUTPATIENT
Start: 2019-12-23 | End: 2019-12-23 | Stop reason: SDUPTHER

## 2019-12-23 RX ADMIN — ALBUMIN (HUMAN) 25 G: 12.5 INJECTION, SOLUTION INTRAVENOUS at 16:43

## 2019-12-23 RX ADMIN — FENTANYL CITRATE 100 MCG: 50 INJECTION, SOLUTION INTRAMUSCULAR; INTRAVENOUS at 08:56

## 2019-12-23 RX ADMIN — SODIUM CHLORIDE 3 UNITS/HR: 9 INJECTION, SOLUTION INTRAVENOUS at 10:25

## 2019-12-23 RX ADMIN — FENTANYL CITRATE 250 MCG: 50 INJECTION, SOLUTION INTRAMUSCULAR; INTRAVENOUS at 09:33

## 2019-12-23 RX ADMIN — VASOPRESSIN 1 UNITS: 20 INJECTION INTRAVENOUS at 12:26

## 2019-12-23 RX ADMIN — KETOROLAC TROMETHAMINE 15 MG: 30 INJECTION, SOLUTION INTRAMUSCULAR; INTRAVENOUS at 16:24

## 2019-12-23 RX ADMIN — HEPARIN SODIUM 45000 UNITS: 10000 INJECTION, SOLUTION INTRAVENOUS; SUBCUTANEOUS at 10:01

## 2019-12-23 RX ADMIN — ROSUVASTATIN CALCIUM 10 MG: 20 TABLET, FILM COATED ORAL at 20:40

## 2019-12-23 RX ADMIN — INSULIN HUMAN 5 UNITS: 100 INJECTION, SOLUTION PARENTERAL at 10:25

## 2019-12-23 RX ADMIN — PHENYLEPHRINE HYDROCHLORIDE 100 MCG: 10 INJECTION INTRAVENOUS at 10:14

## 2019-12-23 RX ADMIN — Medication 0.6 MG: at 12:47

## 2019-12-23 RX ADMIN — CHLORHEXIDINE GLUCONATE 0.12% ORAL RINSE 15 ML: 1.2 LIQUID ORAL at 13:32

## 2019-12-23 RX ADMIN — SODIUM CHLORIDE 30 ML/HR: 9 INJECTION, SOLUTION INTRAVENOUS at 12:45

## 2019-12-23 RX ADMIN — CEFAZOLIN 2000 MG: 1 INJECTION, POWDER, FOR SOLUTION INTRAMUSCULAR; INTRAVENOUS at 11:37

## 2019-12-23 RX ADMIN — PROTAMINE SULFATE 350 MG: 10 INJECTION, SOLUTION INTRAVENOUS at 11:33

## 2019-12-23 RX ADMIN — ASPIRIN 300 MG: 300 SUPPOSITORY RECTAL at 13:32

## 2019-12-23 RX ADMIN — Medication 10 ML: at 20:41

## 2019-12-23 RX ADMIN — CHLORHEXIDINE GLUCONATE: 213 SOLUTION TOPICAL at 05:44

## 2019-12-23 RX ADMIN — VASOPRESSIN 1 UNITS: 20 INJECTION INTRAVENOUS at 12:11

## 2019-12-23 RX ADMIN — MUPIROCIN: 20 OINTMENT TOPICAL at 15:13

## 2019-12-23 RX ADMIN — MUPIROCIN: 20 OINTMENT TOPICAL at 20:40

## 2019-12-23 RX ADMIN — ALBUMIN (HUMAN) 25 G: 12.5 INJECTION, SOLUTION INTRAVENOUS at 15:21

## 2019-12-23 RX ADMIN — PHENYLEPHRINE HYDROCHLORIDE 100 MCG: 10 INJECTION INTRAVENOUS at 10:05

## 2019-12-23 RX ADMIN — ACETAMINOPHEN 650 MG: 325 TABLET, FILM COATED ORAL at 21:03

## 2019-12-23 RX ADMIN — CEFAZOLIN SODIUM 3 G: 10 INJECTION, POWDER, FOR SOLUTION INTRAVENOUS at 19:39

## 2019-12-23 RX ADMIN — CALCIUM CHLORIDE 1 G: 100 INJECTION, SOLUTION INTRAVENOUS; INTRAVENTRICULAR at 11:42

## 2019-12-23 RX ADMIN — SODIUM CHLORIDE, POTASSIUM CHLORIDE, SODIUM LACTATE AND CALCIUM CHLORIDE: 600; 310; 30; 20 INJECTION, SOLUTION INTRAVENOUS at 08:47

## 2019-12-23 RX ADMIN — MIDAZOLAM 4 MG: 1 INJECTION INTRAMUSCULAR; INTRAVENOUS at 08:41

## 2019-12-23 RX ADMIN — OXYCODONE HYDROCHLORIDE 5 MG: 5 TABLET ORAL at 18:41

## 2019-12-23 RX ADMIN — SODIUM CHLORIDE 50 ML/HR: 9 INJECTION, SOLUTION INTRAVENOUS at 21:57

## 2019-12-23 RX ADMIN — FENTANYL CITRATE 50 MCG: 50 INJECTION, SOLUTION INTRAMUSCULAR; INTRAVENOUS at 15:08

## 2019-12-23 RX ADMIN — AMINOCAPROIC ACID 5000 MG: 250 INJECTION, SOLUTION INTRAVENOUS at 08:56

## 2019-12-23 RX ADMIN — PHENYLEPHRINE HYDROCHLORIDE 100 MCG: 10 INJECTION INTRAVENOUS at 11:57

## 2019-12-23 RX ADMIN — ETOMIDATE 16 MG: 2 INJECTION, SOLUTION INTRAVENOUS at 08:56

## 2019-12-23 RX ADMIN — VECURONIUM BROMIDE FOR INJECTION 5 MG: 1 INJECTION, POWDER, LYOPHILIZED, FOR SOLUTION INTRAVENOUS at 11:00

## 2019-12-23 RX ADMIN — CEFAZOLIN SODIUM 2 G: 2 SOLUTION INTRAVENOUS at 09:02

## 2019-12-23 RX ADMIN — FENTANYL CITRATE 400 MCG: 50 INJECTION, SOLUTION INTRAMUSCULAR; INTRAVENOUS at 09:29

## 2019-12-23 RX ADMIN — KETOROLAC TROMETHAMINE 15 MG: 30 INJECTION, SOLUTION INTRAMUSCULAR; INTRAVENOUS at 23:30

## 2019-12-23 RX ADMIN — PHENYLEPHRINE HYDROCHLORIDE 100 MCG: 10 INJECTION INTRAVENOUS at 10:06

## 2019-12-23 RX ADMIN — VECURONIUM BROMIDE FOR INJECTION 10 MG: 1 INJECTION, POWDER, LYOPHILIZED, FOR SOLUTION INTRAVENOUS at 08:56

## 2019-12-23 RX ADMIN — SODIUM CHLORIDE 250 ML: 9 INJECTION, SOLUTION INTRAVENOUS at 22:06

## 2019-12-23 RX ADMIN — SODIUM CHLORIDE, PRESERVATIVE FREE 10 ML: 5 INJECTION INTRAVENOUS at 13:33

## 2019-12-23 RX ADMIN — OXYCODONE HYDROCHLORIDE 10 MG: 5 TABLET ORAL at 22:38

## 2019-12-23 RX ADMIN — Medication 3 MG: at 12:47

## 2019-12-23 RX ADMIN — PHENYLEPHRINE HYDROCHLORIDE 50 MCG: 10 INJECTION INTRAVENOUS at 10:00

## 2019-12-23 RX ADMIN — AMINOCAPROIC ACID 1 G/HR: 250 INJECTION, SOLUTION INTRAVENOUS at 09:09

## 2019-12-23 RX ADMIN — SODIUM CHLORIDE: 9 INJECTION, SOLUTION INTRAVENOUS at 08:27

## 2019-12-23 RX ADMIN — IPRATROPIUM BROMIDE AND ALBUTEROL SULFATE 1 AMPULE: 2.5; .5 SOLUTION RESPIRATORY (INHALATION) at 15:47

## 2019-12-23 RX ADMIN — PANTOPRAZOLE SODIUM 40 MG: 40 INJECTION, POWDER, FOR SOLUTION INTRAVENOUS at 13:33

## 2019-12-23 RX ADMIN — ALBUMIN (HUMAN) 25 G: 12.5 INJECTION, SOLUTION INTRAVENOUS at 11:45

## 2019-12-23 RX ADMIN — PHENYLEPHRINE HYDROCHLORIDE 200 MCG: 10 INJECTION INTRAVENOUS at 10:16

## 2019-12-23 RX ADMIN — PHENYLEPHRINE HYDROCHLORIDE 50 MCG: 10 INJECTION INTRAVENOUS at 09:20

## 2019-12-23 RX ADMIN — IPRATROPIUM BROMIDE AND ALBUTEROL SULFATE 1 AMPULE: 2.5; .5 SOLUTION RESPIRATORY (INHALATION) at 19:20

## 2019-12-23 RX ADMIN — DOCUSATE SODIUM 100 MG: 100 CAPSULE, LIQUID FILLED ORAL at 20:40

## 2019-12-23 RX ADMIN — PHENYLEPHRINE HYDROCHLORIDE 100 MCG: 10 INJECTION INTRAVENOUS at 11:51

## 2019-12-23 RX ADMIN — PROPOFOL 10 MCG/KG/MIN: 10 INJECTION, EMULSION INTRAVENOUS at 13:50

## 2019-12-23 RX ADMIN — CHLORHEXIDINE GLUCONATE 0.12% ORAL RINSE 15 ML: 1.2 LIQUID ORAL at 05:45

## 2019-12-23 RX ADMIN — MIDAZOLAM 2 MG: 1 INJECTION INTRAMUSCULAR; INTRAVENOUS at 08:27

## 2019-12-23 RX ADMIN — FENTANYL CITRATE 250 MCG: 50 INJECTION, SOLUTION INTRAMUSCULAR; INTRAVENOUS at 09:34

## 2019-12-23 RX ADMIN — PHENYLEPHRINE HYDROCHLORIDE 100 MCG: 10 INJECTION INTRAVENOUS at 11:53

## 2019-12-23 ASSESSMENT — PULMONARY FUNCTION TESTS
PIF_VALUE: 24
PIF_VALUE: 22
PIF_VALUE: 25
PIF_VALUE: 0
PIF_VALUE: 24
PIF_VALUE: 26
PIF_VALUE: 25
PIF_VALUE: 0
PIF_VALUE: 1
PIF_VALUE: 26
PIF_VALUE: 0
PIF_VALUE: 22
PIF_VALUE: 0
PIF_VALUE: 1
PIF_VALUE: 26
PIF_VALUE: 23
PIF_VALUE: 24
PIF_VALUE: 28
PIF_VALUE: 1
PIF_VALUE: 2
PIF_VALUE: 1
PIF_VALUE: 27
PIF_VALUE: 24
PIF_VALUE: 0
PIF_VALUE: 26
PIF_VALUE: 21
PIF_VALUE: 31
PIF_VALUE: 21
PIF_VALUE: 24
PIF_VALUE: 2
PIF_VALUE: 25
PIF_VALUE: 21
PIF_VALUE: 25
PIF_VALUE: 25
PIF_VALUE: 3
PIF_VALUE: 24
PIF_VALUE: 1
PIF_VALUE: 1
PIF_VALUE: 24
PIF_VALUE: 22
PIF_VALUE: 1
PIF_VALUE: 25
PIF_VALUE: 25
PIF_VALUE: 1
PIF_VALUE: 1
PIF_VALUE: 24
PIF_VALUE: 2
PIF_VALUE: 22
PIF_VALUE: 25
PIF_VALUE: 1
PIF_VALUE: 25
PIF_VALUE: 24
PIF_VALUE: 1
PIF_VALUE: 25
PIF_VALUE: 1
PIF_VALUE: 23
PIF_VALUE: 19
PIF_VALUE: 24
PIF_VALUE: 27
PIF_VALUE: 1
PIF_VALUE: 26
PIF_VALUE: 23
PIF_VALUE: 26
PIF_VALUE: 1
PIF_VALUE: 0
PIF_VALUE: 24
PIF_VALUE: 1
PIF_VALUE: 25
PIF_VALUE: 2
PIF_VALUE: 22
PIF_VALUE: 23
PIF_VALUE: 22
PIF_VALUE: 22
PIF_VALUE: 1
PIF_VALUE: 25
PIF_VALUE: 25
PIF_VALUE: 27
PIF_VALUE: 5
PIF_VALUE: 1
PIF_VALUE: 27
PIF_VALUE: 1
PIF_VALUE: 25
PIF_VALUE: 26
PIF_VALUE: 1
PIF_VALUE: 0
PIF_VALUE: 24
PIF_VALUE: 18
PIF_VALUE: 27
PIF_VALUE: 22
PIF_VALUE: 23
PIF_VALUE: 1
PIF_VALUE: 24
PIF_VALUE: 25
PIF_VALUE: 25
PIF_VALUE: 24
PIF_VALUE: 22
PIF_VALUE: 28
PIF_VALUE: 1
PIF_VALUE: 25
PIF_VALUE: 1
PIF_VALUE: 23
PIF_VALUE: 25
PIF_VALUE: 1
PIF_VALUE: 25
PIF_VALUE: 1
PIF_VALUE: 25
PIF_VALUE: 23
PIF_VALUE: 22
PIF_VALUE: 26
PIF_VALUE: 24
PIF_VALUE: 23
PIF_VALUE: 23
PIF_VALUE: 1
PIF_VALUE: 40
PIF_VALUE: 22
PIF_VALUE: 35
PIF_VALUE: 24
PIF_VALUE: 18
PIF_VALUE: 23
PIF_VALUE: 1
PIF_VALUE: 1
PIF_VALUE: 0
PIF_VALUE: 24
PIF_VALUE: 0
PIF_VALUE: 25
PIF_VALUE: 27
PIF_VALUE: 1
PIF_VALUE: 0
PIF_VALUE: 1
PIF_VALUE: 22
PIF_VALUE: 1
PIF_VALUE: 24
PIF_VALUE: 25
PIF_VALUE: 22
PIF_VALUE: 25
PIF_VALUE: 20
PIF_VALUE: 0
PIF_VALUE: 22
PIF_VALUE: 22
PIF_VALUE: 25
PIF_VALUE: 22
PIF_VALUE: 25
PIF_VALUE: 26
PIF_VALUE: 0
PIF_VALUE: 25
PIF_VALUE: 26
PIF_VALUE: 25
PIF_VALUE: 1
PIF_VALUE: 25
PIF_VALUE: 0
PIF_VALUE: 25
PIF_VALUE: 24
PIF_VALUE: 0
PIF_VALUE: 25
PIF_VALUE: 0
PIF_VALUE: 1
PIF_VALUE: 25
PIF_VALUE: 23
PIF_VALUE: 1
PIF_VALUE: 1
PIF_VALUE: 23
PIF_VALUE: 24
PIF_VALUE: 1
PIF_VALUE: 19
PIF_VALUE: 24
PIF_VALUE: 25
PIF_VALUE: 1
PIF_VALUE: 26
PIF_VALUE: 25
PIF_VALUE: 4
PIF_VALUE: 1
PIF_VALUE: 20
PIF_VALUE: 23
PIF_VALUE: 24
PIF_VALUE: 3
PIF_VALUE: 1
PIF_VALUE: 0
PIF_VALUE: 0
PIF_VALUE: 23
PIF_VALUE: 1
PIF_VALUE: 23
PIF_VALUE: 25
PIF_VALUE: 0
PIF_VALUE: 0
PIF_VALUE: 25
PIF_VALUE: 1
PIF_VALUE: 1
PIF_VALUE: 25
PIF_VALUE: 25
PIF_VALUE: 26
PIF_VALUE: 24
PIF_VALUE: 1
PIF_VALUE: 25
PIF_VALUE: 2
PIF_VALUE: 25
PIF_VALUE: 1
PIF_VALUE: 3
PIF_VALUE: 26
PIF_VALUE: 23
PIF_VALUE: 0
PIF_VALUE: 25
PIF_VALUE: 24
PIF_VALUE: 25
PIF_VALUE: 27
PIF_VALUE: 25
PIF_VALUE: 1
PIF_VALUE: 25
PIF_VALUE: 27
PIF_VALUE: 24
PIF_VALUE: 3
PIF_VALUE: 25
PIF_VALUE: 24
PIF_VALUE: 1
PIF_VALUE: 23
PIF_VALUE: 33
PIF_VALUE: 27
PIF_VALUE: 26
PIF_VALUE: 22
PIF_VALUE: 1
PIF_VALUE: 23
PIF_VALUE: 2
PIF_VALUE: 27
PIF_VALUE: 25
PIF_VALUE: 23
PIF_VALUE: 24
PIF_VALUE: 0
PIF_VALUE: 17
PIF_VALUE: 24
PIF_VALUE: 0
PIF_VALUE: 24
PIF_VALUE: 24
PIF_VALUE: 26
PIF_VALUE: 1
PIF_VALUE: 1
PIF_VALUE: 0
PIF_VALUE: 0
PIF_VALUE: 22
PIF_VALUE: 24
PIF_VALUE: 2
PIF_VALUE: 25
PIF_VALUE: 23
PIF_VALUE: 26
PIF_VALUE: 27

## 2019-12-23 ASSESSMENT — PAIN DESCRIPTION - ORIENTATION
ORIENTATION: MID
ORIENTATION: LEFT;UPPER
ORIENTATION: MID

## 2019-12-23 ASSESSMENT — PAIN DESCRIPTION - PROGRESSION
CLINICAL_PROGRESSION: NOT CHANGED
CLINICAL_PROGRESSION: RAPIDLY WORSENING

## 2019-12-23 ASSESSMENT — PAIN SCALES - GENERAL
PAINLEVEL_OUTOF10: 0
PAINLEVEL_OUTOF10: 5
PAINLEVEL_OUTOF10: 0
PAINLEVEL_OUTOF10: 10
PAINLEVEL_OUTOF10: 10
PAINLEVEL_OUTOF10: 8
PAINLEVEL_OUTOF10: 0
PAINLEVEL_OUTOF10: 7
PAINLEVEL_OUTOF10: 10
PAINLEVEL_OUTOF10: 8
PAINLEVEL_OUTOF10: 0
PAINLEVEL_OUTOF10: 0

## 2019-12-23 ASSESSMENT — PAIN - FUNCTIONAL ASSESSMENT
PAIN_FUNCTIONAL_ASSESSMENT: PREVENTS OR INTERFERES SOME ACTIVE ACTIVITIES AND ADLS
PAIN_FUNCTIONAL_ASSESSMENT: 0-10

## 2019-12-23 ASSESSMENT — PAIN DESCRIPTION - LOCATION
LOCATION: CHEST
LOCATION: CHEST
LOCATION: CHEST;INCISION
LOCATION: CHEST

## 2019-12-23 ASSESSMENT — PAIN DESCRIPTION - DESCRIPTORS
DESCRIPTORS: DISCOMFORT;CONSTANT;SORE
DESCRIPTORS: CONSTANT;DISCOMFORT;SORE
DESCRIPTORS: ACHING
DESCRIPTORS: CONSTANT;DISCOMFORT

## 2019-12-23 ASSESSMENT — PAIN DESCRIPTION - PAIN TYPE
TYPE: SURGICAL PAIN

## 2019-12-23 ASSESSMENT — PAIN DESCRIPTION - ONSET
ONSET: ON-GOING
ONSET: ON-GOING

## 2019-12-23 ASSESSMENT — PAIN DESCRIPTION - FREQUENCY
FREQUENCY: CONTINUOUS
FREQUENCY: CONTINUOUS

## 2019-12-23 NOTE — BRIEF OP NOTE
Brief Postoperative Note    Judy Manjarrez  YOB: 1950  [id number]    Pre-operative Diagnosis: UA, CAD    Post-operative Diagnosis: Same    Operation: Sternotomy/CABG x 3 (LIMA-LAD, SVG-RI, SVG-OM)/EVH LLE/Rigid internal fixation of the sternum with SternaLock plates x 2    Anesthesia: General    Surgeon: Frederic Roldan    Assistants: Hebert/Gene/Ron    Estimated Blood Loss: 5708    Complications: None    Specimens:   * No specimens in log *    Implants:  Implant Name Type Inv. Item Serial No.  Lot No. LRB No. Used   PLATE 12 HOLE LADDER Screw/Plate/Nail/Asher PLATE 12 HOLE LADDER  [employer]  N/A 1   PLATE 6 HL HEX Screw/Plate/Nail/Asher PLATE 6 HL HEX  [employer]  N/A 1   SCREW LK STERNALOCK 2.4X14MM Screw/Plate/Nail/Asher SCREW LK STERNALOCK 2.4X14MM  [employer]  N/A 10   SCREW STERNALOCK 2. [id number] Screw/Plate/Nail/Asher SCREW STERNALOCK 2. [id number]  [employer]  N/A 2   SCREW STERNALOCK 2.4X20MM Screw/Plate/Nail/Asher SCREW STERNALOCK 2.4X20MM  [employer]  N/A 6         Drains:   Chest Tube 1 Posterior Pericardial 32 [place]raini (Active)   Dressing Status Clean;Dry; Intact 12/23/2019  9:36 AM   Dressing Type Dry dressing 12/23/2019  9:36 AM   Site Assessment Clean;Dry; Intact 12/23/2019  9:36 AM       Chest Tube 2 Anterior Pericardial 32 [place]i (Active)   Dressing Status Clean;Dry; Intact 12/23/2019  9:36 AM   Dressing Type Dry dressing 12/23/2019  9:36 AM   Site Assessment Clean;Dry; Intact 12/23/2019  9:36 AM       Chest Tube 3 Left Pleural 28 [place]raini (Active)   Dressing Status Clean;Dry; Intact 12/23/2019  9:37 AM   Dressing Type Dry dressing 12/23/2019  9:37 AM   Site Assessment Clean;Dry; Intact 12/23/2019  9:37 AM       NG/OG/NJ/NE Tube Orogastric (Active)       Urethral Catheter Non-latex 16 fr (Active)       Findings: normal biventricular function    Guanako Marquez MD  Date: 12/23/2019  Time: 12:08 PM

## 2019-12-23 NOTE — CONSULTS
(ROXICODONE) immediate release tablet 10 mg  10 mg Oral Q4H PRN Roselynn Rather, APRN - CNP        fentaNYL (SUBLIMAZE) injection 25 mcg  25 mcg Intravenous Q1H PRN Roselynn Rather, APRN - CNP        Or    fentaNYL (SUBLIMAZE) injection 50 mcg  50 mcg Intravenous Q1H PRN Roselynn Rather, APRN - CNP        docusate sodium (COLACE) capsule 100 mg  100 mg Oral BID Roselynn Rather, APRN - CNP        magnesium hydroxide (MILK OF MAGNESIA) 400 MG/5ML suspension 30 mL  30 mL Oral Daily PRN Roselynn Rather, APRN - CNP        ondansetron [facility]) injection 4 mg  4 mg Intravenous Q8H PRN Roselynn Rather, APRN - CNP        [START ON 12/24/2019] pantoprazole (PROTONIX) tablet 40 mg  40 mg Oral Daily Roselynn Rather, APRN - CNP        pantoprazole (PROTONIX) injection 40 mg  40 mg Intravenous Daily Roselynn Rather, APRN - CNP        And    sodium chloride (PF) 0.9 % injection 10 mL  10 mL Intravenous Daily Roselynn Rather, APRN - CNP        chlorhexidine (PERIDEX) 0.12 % solution 15 mL  15 mL Mouth/Throat BID Roselynn Rather, APRN - CNP        [START ON 12/24/2019] magnesium oxide (MAG-OX) tablet 400 mg  400 mg Oral Daily Roselynn Rather, APRN - CNP        mupirocin (BACTROBAN) 2 % ointment   Nasal BID Roselynn Rather, APRN - CNP        propofol injection  10 mcg/kg/min Intravenous Continuous PRN Roselynn Rather, APRN - CNP 7.2 mL/hr at 12/23/19 1350 10 mcg/kg/min at 12/23/19 1350    [START ON 12/24/2019] aspirin EC tablet 81 mg  81 mg Oral Daily Roselynn Rather, APRN - CNP        aspirin suppository 300 mg  300 mg Rectal Once Roselynn Rather, APRN - CNP        meperidine (DEMEROL) injection 25 mg  25 mg Intravenous Once PRN Roselynn Rather, APRN - CNP        ipratropium-albuterol (DUONEB) nebulizer solution 1 ampule  1 ampule Inhalation Q4H [place] Melissann Rather, APRN - CNP        albumin human 5 % IV solution 25 g  25 g Intravenous PRN Roselynn Rather, APRN - CNP        0.9 % sodium chloride bolus  250 mL Intravenous Continuous PRN Wilhelmena Burkitt, APRN - CNP        norepinephrine (LEVOPHED) 16 mg in dextrose 5% 250 mL infusion  2 mcg/min Intravenous Continuous PRN Wilhelmena Burkitt, APRN - CNP        clevidipine (CLEVIPREX) infusion  2 mg/hr Intravenous Continuous PRN Wilhelmena Burkitt, APRN - CNP        insulin regular (HUMULIN R;NOVOLIN R) 100 Units in sodium chloride 0.9 % 100 mL infusion  1 Units/hr Intravenous Continuous PRN Wilhelmena Burkitt, APRN - CNP        [START ON 12/24/2019] insulin glargine (LANTUS) injection vial 18 Units  0.15 Units/kg Subcutaneous Nightly Wilhelmena Burkitt, APRN - CNP        glucose (GLUTOSE) 40 % oral gel 15 g  15 g Oral PRN Wilhelmena Burkitt, APRN - CNP        dextrose 50 % IV solution  12.5 g Intravenous PRN Wilhelmena Burkitt, APRN - CNP        glucagon (rDNA) injection 1 mg  1 mg Intramuscular PRN Wilhelmena Burkitt, APRN - CNP        dextrose 5 % solution  100 mL/hr Intravenous PRN Wilhelmena Burkitt, APRN - CNP        aluminum & magnesium hydroxide-simethicone (MAALOX) 200-200-20 MG/5ML suspension 30 mL  30 mL Per NG tube Q4H PRN Wilhelmena Burkitt, APRN - CNP        calcium gluconate 1 g in dextrose 5 % 100 mL IVPB  1 g Intravenous PRN Wilhelmena Burkitt, APRN - CNP        [START ON 12/24/2019] clopidogrel (PLAVIX) tablet 75 mg  75 mg Oral Daily Wilhelmena Burkitt, APRN - CNP        insulin lispro (HUMALOG) injection vial 0-3 Units  0-3 Units Subcutaneous Q4H Wilhelmena Burkitt, APRN - CNP        [START ON 12/24/2019] insulin lispro (HUMALOG) injection vial 0-18 Units  0-18 Units Subcutaneous Q4H Wilhelmena Burkitt, APRN - CNP        [START ON 12/24/2019] tamsulosin (FLOMAX) capsule 0.4 mg  0.4 mg Oral Daily Wilhelmena Burkitt, APRN - CNP        [START ON 12/24/2019] senna (SENOKOT) tablet 8.6 mg  1 tablet Oral Nightly Wilhelmena Burkitt, APRN - CNP        propofol 1000 MG/100ML injection             albumin human 5 % IV solution               sodium chloride 30 mL/hr (12/23/19 1245)    propofol 10 mcg/kg/min (12/23/19 1350)    sodium chloride      norepinephrine      clevidipine      insulin      dextrose         Physical Exam:  /69   Pulse 75   Temp 96.5 °F (35.8 °C) (Temporal)   Resp 17   Ht 6' (1.829 m)   Wt 265 lb (120.2 kg)   SpO2 97%   BMI 35.94 kg/m²   Weight change: Wt Readings from Last 3 Encounters:   12/23/19 265 lb (120.2 kg)   12/19/19 265 lb (120.2 kg)   12/17/19 260 lb (117.9 kg)         General: Awake, alert, oriented x3, no acute distress  HEENT: Unremarkable  Neck: No JVD or bruits. Cardiac: Regular rate and rhythm, normal S1 and S2, no extra heart sounds, murmurs, heaves, thrills  Resp: Lungs clear without wheezing or crackles. No accessory muscle use or retraction  Abdomen: soft, nontender, nondistended, no gross organomegaly or mass  Skin: Warm and dry, no cyanosis. Musculoskeletal: normal tone and strength in the upper and lower extremities bilaterally  Neuro: Grossly unremarkable  Psych: Cooperative, and normal affect    Intake/Output:    Intake/Output Summary (Last 24 hours) at 12/23/2019 1326  Last data filed at 12/23/2019 1251  Gross per 24 hour   Intake 2775 ml   Output 2440 ml   Net 335 ml     I/O this shift:  In: 2775 [I.V.:2500; Blood:275]  Out: 5626 [TKVPT:1221; Blood:1000; Chest Tube:5]    Laboratory Tests:  Lab Results   Component Value Date    CREATININE 0.8 12/19/2019    BUN 13 12/19/2019     12/19/2019    K 5.0 12/23/2019     12/19/2019    CO2 25 12/19/2019     No results for input(s): CKTOTAL, CKMB in the last 72 hours. Invalid input(s): TROPONONI  No results found for: BNP  Lab Results   Component Value Date    WBC 14.9 12/23/2019    RBC 4.04 12/23/2019    HGB 13.3 12/23/2019    HCT 41.4 12/23/2019    .5 12/23/2019    MCH 32.9 12/23/2019    MCHC 32.1 12/23/2019    RDW 12.2 12/23/2019     12/23/2019    MPV 10.8 12/23/2019     No results for input(s): ALKPHOS, ALT, AST, PROT, BILITOT, BILIDIR, LABALBU in the last 72 hours.   No results found for: MG  Lab

## 2019-12-24 ENCOUNTER — APPOINTMENT (OUTPATIENT)
Dept: GENERAL RADIOLOGY | Age: 69
DRG: 236 | End: 2019-12-24
Attending: THORACIC SURGERY (CARDIOTHORACIC VASCULAR SURGERY)
Payer: MEDICARE

## 2019-12-24 LAB
ANION GAP SERPL CALCULATED.3IONS-SCNC: 15 MMOL/L (ref 7–16)
BUN BLDV-MCNC: 19 MG/DL (ref 8–23)
CALCIUM SERPL-MCNC: 8.5 MG/DL (ref 8.6–10.2)
CHLORIDE BLD-SCNC: 104 MMOL/L (ref 98–107)
CO2: 22 MMOL/L (ref 22–29)
CREAT SERPL-MCNC: 1 MG/DL (ref 0.7–1.2)
GFR AFRICAN AMERICAN: >60
GFR NON-AFRICAN AMERICAN: >60 ML/MIN/1.73
GLUCOSE BLD-MCNC: 140 MG/DL (ref 74–99)
HCT VFR BLD CALC: 38.4 % (ref 37–54)
HEMOGLOBIN: 12.3 G/DL (ref 12.5–16.5)
MCH RBC QN AUTO: 33.4 PG (ref 26–35)
MCHC RBC AUTO-ENTMCNC: 32 % (ref 32–34.5)
MCV RBC AUTO: 104.3 FL (ref 80–99.9)
METER GLUCOSE: 102 MG/DL (ref 74–99)
METER GLUCOSE: 117 MG/DL (ref 74–99)
METER GLUCOSE: 121 MG/DL (ref 74–99)
METER GLUCOSE: 123 MG/DL (ref 74–99)
METER GLUCOSE: 148 MG/DL (ref 74–99)
METER GLUCOSE: 159 MG/DL (ref 74–99)
PDW BLD-RTO: 12.4 FL (ref 11.5–15)
PLATELET # BLD: 185 E9/L (ref 130–450)
PMV BLD AUTO: 11.5 FL (ref 7–12)
POTASSIUM SERPL-SCNC: 3.9 MMOL/L (ref 3.5–5)
POTASSIUM SERPL-SCNC: 4.1 MMOL/L (ref 3.5–5)
RBC # BLD: 3.68 E12/L (ref 3.8–5.8)
SODIUM BLD-SCNC: 141 MMOL/L (ref 132–146)
WBC # BLD: 8.9 E9/L (ref 4.5–11.5)

## 2019-12-24 PROCEDURE — 94640 AIRWAY INHALATION TREATMENT: CPT

## 2019-12-24 PROCEDURE — 6360000002 HC RX W HCPCS: Performed by: NURSE PRACTITIONER

## 2019-12-24 PROCEDURE — 97530 THERAPEUTIC ACTIVITIES: CPT

## 2019-12-24 PROCEDURE — 97535 SELF CARE MNGMENT TRAINING: CPT

## 2019-12-24 PROCEDURE — 94669 MECHANICAL CHEST WALL OSCILL: CPT

## 2019-12-24 PROCEDURE — 6370000000 HC RX 637 (ALT 250 FOR IP): Performed by: NURSE PRACTITIONER

## 2019-12-24 PROCEDURE — 2580000003 HC RX 258: Performed by: NURSE PRACTITIONER

## 2019-12-24 PROCEDURE — 94660 CPAP INITIATION&MGMT: CPT

## 2019-12-24 PROCEDURE — 84132 ASSAY OF SERUM POTASSIUM: CPT

## 2019-12-24 PROCEDURE — 80048 BASIC METABOLIC PNL TOTAL CA: CPT

## 2019-12-24 PROCEDURE — 51798 US URINE CAPACITY MEASURE: CPT

## 2019-12-24 PROCEDURE — 2140000000 HC CCU INTERMEDIATE R&B

## 2019-12-24 PROCEDURE — 97166 OT EVAL MOD COMPLEX 45 MIN: CPT

## 2019-12-24 PROCEDURE — 51701 INSERT BLADDER CATHETER: CPT

## 2019-12-24 PROCEDURE — 97162 PT EVAL MOD COMPLEX 30 MIN: CPT

## 2019-12-24 PROCEDURE — 71045 X-RAY EXAM CHEST 1 VIEW: CPT

## 2019-12-24 PROCEDURE — 36415 COLL VENOUS BLD VENIPUNCTURE: CPT

## 2019-12-24 PROCEDURE — 2700000000 HC OXYGEN THERAPY PER DAY

## 2019-12-24 PROCEDURE — 82962 GLUCOSE BLOOD TEST: CPT

## 2019-12-24 PROCEDURE — 85027 COMPLETE CBC AUTOMATED: CPT

## 2019-12-24 RX ORDER — FUROSEMIDE 10 MG/ML
20 INJECTION INTRAMUSCULAR; INTRAVENOUS ONCE
Status: COMPLETED | OUTPATIENT
Start: 2019-12-24 | End: 2019-12-24

## 2019-12-24 RX ORDER — FUROSEMIDE 10 MG/ML
40 INJECTION INTRAMUSCULAR; INTRAVENOUS ONCE
Status: COMPLETED | OUTPATIENT
Start: 2019-12-24 | End: 2019-12-24

## 2019-12-24 RX ORDER — ONDANSETRON 2 MG/ML
4 INJECTION INTRAMUSCULAR; INTRAVENOUS EVERY 8 HOURS PRN
Status: DISCONTINUED | OUTPATIENT
Start: 2019-12-24 | End: 2019-12-27 | Stop reason: HOSPADM

## 2019-12-24 RX ORDER — FERROUS SULFATE 325(65) MG
325 TABLET ORAL 2 TIMES DAILY WITH MEALS
Status: DISCONTINUED | OUTPATIENT
Start: 2019-12-24 | End: 2019-12-27 | Stop reason: HOSPADM

## 2019-12-24 RX ORDER — ASCORBIC ACID 500 MG
500 TABLET ORAL 2 TIMES DAILY
Status: DISCONTINUED | OUTPATIENT
Start: 2019-12-24 | End: 2019-12-27 | Stop reason: HOSPADM

## 2019-12-24 RX ORDER — DOCUSATE SODIUM 100 MG/1
100 CAPSULE, LIQUID FILLED ORAL 2 TIMES DAILY
Status: DISCONTINUED | OUTPATIENT
Start: 2019-12-24 | End: 2019-12-27 | Stop reason: HOSPADM

## 2019-12-24 RX ORDER — PANTOPRAZOLE SODIUM 40 MG/1
40 TABLET, DELAYED RELEASE ORAL DAILY
Status: DISCONTINUED | OUTPATIENT
Start: 2019-12-25 | End: 2019-12-27 | Stop reason: HOSPADM

## 2019-12-24 RX ORDER — ASPIRIN 81 MG/1
81 TABLET ORAL DAILY
Status: DISCONTINUED | OUTPATIENT
Start: 2019-12-25 | End: 2019-12-27 | Stop reason: HOSPADM

## 2019-12-24 RX ORDER — HYDROCODONE BITARTRATE AND ACETAMINOPHEN 5; 325 MG/1; MG/1
1 TABLET ORAL EVERY 4 HOURS PRN
Status: DISCONTINUED | OUTPATIENT
Start: 2019-12-24 | End: 2019-12-27 | Stop reason: HOSPADM

## 2019-12-24 RX ORDER — POTASSIUM CHLORIDE 20 MEQ/1
20 TABLET, EXTENDED RELEASE ORAL PRN
Status: DISCONTINUED | OUTPATIENT
Start: 2019-12-24 | End: 2019-12-27 | Stop reason: HOSPADM

## 2019-12-24 RX ORDER — HYDROCODONE BITARTRATE AND ACETAMINOPHEN 5; 325 MG/1; MG/1
2 TABLET ORAL EVERY 4 HOURS PRN
Status: DISCONTINUED | OUTPATIENT
Start: 2019-12-24 | End: 2019-12-27 | Stop reason: HOSPADM

## 2019-12-24 RX ORDER — FOLIC ACID 1 MG/1
1 TABLET ORAL DAILY
Status: DISCONTINUED | OUTPATIENT
Start: 2019-12-24 | End: 2019-12-27 | Stop reason: HOSPADM

## 2019-12-24 RX ORDER — ACETAMINOPHEN 325 MG/1
650 TABLET ORAL EVERY 4 HOURS PRN
Status: DISCONTINUED | OUTPATIENT
Start: 2019-12-24 | End: 2019-12-27 | Stop reason: HOSPADM

## 2019-12-24 RX ADMIN — CEFAZOLIN SODIUM 3 G: 10 INJECTION, POWDER, FOR SOLUTION INTRAVENOUS at 03:22

## 2019-12-24 RX ADMIN — MAGNESIUM GLUCONATE 500 MG ORAL TABLET 400 MG: 500 TABLET ORAL at 08:52

## 2019-12-24 RX ADMIN — Medication 500 MG: at 22:12

## 2019-12-24 RX ADMIN — POTASSIUM CHLORIDE 20 MEQ: 1500 TABLET, EXTENDED RELEASE ORAL at 13:54

## 2019-12-24 RX ADMIN — MUPIROCIN: 20 OINTMENT TOPICAL at 22:33

## 2019-12-24 RX ADMIN — INSULIN LISPRO 1 UNITS: 100 INJECTION, SOLUTION INTRAVENOUS; SUBCUTANEOUS at 22:18

## 2019-12-24 RX ADMIN — OXYCODONE HYDROCHLORIDE 10 MG: 5 TABLET ORAL at 07:39

## 2019-12-24 RX ADMIN — HYDROCODONE BITARTRATE AND ACETAMINOPHEN 2 TABLET: 5; 325 TABLET ORAL at 16:25

## 2019-12-24 RX ADMIN — CEFAZOLIN SODIUM 3 G: 10 INJECTION, POWDER, FOR SOLUTION INTRAVENOUS at 11:44

## 2019-12-24 RX ADMIN — OXYCODONE HYDROCHLORIDE 10 MG: 5 TABLET ORAL at 03:25

## 2019-12-24 RX ADMIN — IPRATROPIUM BROMIDE AND ALBUTEROL SULFATE 1 AMPULE: 2.5; .5 SOLUTION RESPIRATORY (INHALATION) at 09:23

## 2019-12-24 RX ADMIN — PANTOPRAZOLE SODIUM 40 MG: 40 TABLET, DELAYED RELEASE ORAL at 08:52

## 2019-12-24 RX ADMIN — HYDROCODONE BITARTRATE AND ACETAMINOPHEN 2 TABLET: 5; 325 TABLET ORAL at 21:01

## 2019-12-24 RX ADMIN — KETOROLAC TROMETHAMINE 15 MG: 30 INJECTION, SOLUTION INTRAMUSCULAR; INTRAVENOUS at 19:37

## 2019-12-24 RX ADMIN — FOLIC ACID 1 MG: 1 TABLET ORAL at 12:45

## 2019-12-24 RX ADMIN — Medication 10 ML: at 22:15

## 2019-12-24 RX ADMIN — SENNOSIDES 8.6 MG: 8.6 TABLET, FILM COATED ORAL at 22:11

## 2019-12-24 RX ADMIN — METOPROLOL TARTRATE 12.5 MG: 25 TABLET ORAL at 08:52

## 2019-12-24 RX ADMIN — Medication 10 ML: at 08:51

## 2019-12-24 RX ADMIN — INSULIN LISPRO 3 UNITS: 100 INJECTION, SOLUTION INTRAVENOUS; SUBCUTANEOUS at 11:25

## 2019-12-24 RX ADMIN — IPRATROPIUM BROMIDE AND ALBUTEROL SULFATE 1 AMPULE: 2.5; .5 SOLUTION RESPIRATORY (INHALATION) at 14:35

## 2019-12-24 RX ADMIN — CEFAZOLIN SODIUM 3 G: 10 INJECTION, POWDER, FOR SOLUTION INTRAVENOUS at 19:46

## 2019-12-24 RX ADMIN — DOCUSATE SODIUM 100 MG: 100 CAPSULE, LIQUID FILLED ORAL at 22:12

## 2019-12-24 RX ADMIN — DOCUSATE SODIUM 100 MG: 100 CAPSULE, LIQUID FILLED ORAL at 08:52

## 2019-12-24 RX ADMIN — ACETAMINOPHEN 650 MG: 325 TABLET, FILM COATED ORAL at 19:37

## 2019-12-24 RX ADMIN — ASPIRIN 81 MG: 81 TABLET, COATED ORAL at 08:51

## 2019-12-24 RX ADMIN — OXYCODONE HYDROCHLORIDE 10 MG: 5 TABLET ORAL at 11:38

## 2019-12-24 RX ADMIN — CLOPIDOGREL 75 MG: 75 TABLET, FILM COATED ORAL at 08:52

## 2019-12-24 RX ADMIN — FUROSEMIDE 20 MG: 10 INJECTION, SOLUTION INTRAMUSCULAR; INTRAVENOUS at 08:52

## 2019-12-24 RX ADMIN — IPRATROPIUM BROMIDE AND ALBUTEROL SULFATE 1 AMPULE: 2.5; .5 SOLUTION RESPIRATORY (INHALATION) at 20:42

## 2019-12-24 RX ADMIN — Medication 500 MG: at 12:46

## 2019-12-24 RX ADMIN — FERROUS SULFATE TAB 325 MG (65 MG ELEMENTAL FE) 325 MG: 325 (65 FE) TAB at 16:25

## 2019-12-24 RX ADMIN — TAMSULOSIN HYDROCHLORIDE 0.4 MG: 0.4 CAPSULE ORAL at 08:52

## 2019-12-24 RX ADMIN — ROSUVASTATIN CALCIUM 10 MG: 20 TABLET, FILM COATED ORAL at 22:11

## 2019-12-24 RX ADMIN — METOPROLOL TARTRATE 12.5 MG: 25 TABLET ORAL at 22:12

## 2019-12-24 RX ADMIN — MUPIROCIN: 20 OINTMENT TOPICAL at 08:52

## 2019-12-24 RX ADMIN — FUROSEMIDE 40 MG: 10 INJECTION, SOLUTION INTRAMUSCULAR; INTRAVENOUS at 14:54

## 2019-12-24 ASSESSMENT — PAIN DESCRIPTION - FREQUENCY
FREQUENCY: INTERMITTENT

## 2019-12-24 ASSESSMENT — PAIN DESCRIPTION - ORIENTATION
ORIENTATION: MID
ORIENTATION: MID
ORIENTATION: LEFT;UPPER
ORIENTATION: MID

## 2019-12-24 ASSESSMENT — PAIN DESCRIPTION - PROGRESSION
CLINICAL_PROGRESSION: GRADUALLY WORSENING
CLINICAL_PROGRESSION: GRADUALLY IMPROVING

## 2019-12-24 ASSESSMENT — PAIN SCALES - GENERAL
PAINLEVEL_OUTOF10: 7
PAINLEVEL_OUTOF10: 0
PAINLEVEL_OUTOF10: 6
PAINLEVEL_OUTOF10: 6
PAINLEVEL_OUTOF10: 5
PAINLEVEL_OUTOF10: 0
PAINLEVEL_OUTOF10: 8
PAINLEVEL_OUTOF10: 5
PAINLEVEL_OUTOF10: 7

## 2019-12-24 ASSESSMENT — PAIN DESCRIPTION - LOCATION
LOCATION: CHEST
LOCATION: CHEST
LOCATION: STERNUM
LOCATION: CHEST

## 2019-12-24 ASSESSMENT — PAIN DESCRIPTION - DESCRIPTORS
DESCRIPTORS: ACHING
DESCRIPTORS: ACHING;DISCOMFORT

## 2019-12-24 ASSESSMENT — PAIN DESCRIPTION - ONSET
ONSET: ON-GOING
ONSET: ON-GOING

## 2019-12-24 ASSESSMENT — PAIN DESCRIPTION - PAIN TYPE
TYPE: SURGICAL PAIN

## 2019-12-24 ASSESSMENT — PAIN - FUNCTIONAL ASSESSMENT: PAIN_FUNCTIONAL_ASSESSMENT: ACTIVITIES ARE NOT PREVENTED

## 2019-12-24 NOTE — PROGRESS NOTES
Date: 12/23/2019    Time: 7:21 PM    Patient Placed On BIPAP/CPAP/ Non-Invasive Ventilation? Yes    If no must comment. Facial area red/color change? No           If YES are Blister/Lesion present? No   If yes must notify nursing staff  BIPAP/CPAP skin barrier?   Yes    Skin barrier type:mepilex       Comments:        Christie Redd

## 2019-12-24 NOTE — CARE COORDINATION
Transition of care: [facility]. POD#1 CABG x 3. Met with pt. Pt lives with his wife in a 1 story home. Independent with ADLs and drives. 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 We discussed dc planning after cardiac surgery - home with [facility] or [facility]. If home with c, pt will need to have family with him 24/7 for 1st 1-2 weeks after dc and ambulate 400ft prior to dc. Provided pt with [facility] and [facility] list to make choices. PCP is Dr. Easton Burris and pharmacy is [facility]. Sw/cm will follow   The Plan for Transition of Care is related to the following treatment goals: The Patient  was provided with a choice of provider and agrees   with the discharge plan. [x] Yes [] No  Freedom of choice list was provided with basic dialogue that supports the patient's individualized plan of care/goals, treatment preferences and shares the quality data associated with the providers.  [x] Yes [] No

## 2019-12-24 NOTE — PROGRESS NOTES
UB dressing to mod I   LB Dressing Max A    improve LB dressing to mod I with AE PRN   Bathing Max A    improve UB/LB bathing to min A   Toileting DEP    improve toileting task and all clothing mgmt to min A   Bed Mobility  Supine to sit: Max A  Sit to supine: NT  improve bed mobility to mod I in prep for EOB ADL tasks   Functional Transfers Sit to stand: Max A-chair  Stand to sit: Max A  improve all functional transfers to mod I   Functional Mobility Min A with no AE for SPT to bedside chair  improve functional mobility to ind   Balance Sitting:     Static:  sup    Dynamic: min A  Standing: Max A  demo G dynamic sitting balance EOB during ADL tasks   Endurance/Activity Tolerance F-  demo G- activity tolerance/endurance during 15-20 min ADL task    Visual/  Perceptual Glasses: yes, wife to bring in              Hand dominance: R  UE ROM: Within sternal precautions -RUE: WFL  LUE: WFL  Strength: Within sternal precautions - RUE: grossly WFL LUE: grossly WFL   Strength: WFL  Fine Motor Coordination: WFL    Hearing: WFL  Sensation: No c/o numbness or tingling  Tone: WNL  Edema: unremarkable    Vitals:  Blood Pressure at rest 126/70 Blood Pressure post session 119/67   Heart Rate at rest 72 bpm Heart Rate post session 77 bpm   SPO2 at rest 95% on 5 L  SPO2 post session 93% on 5 L                             Comments/Treatment Narrative: OK from RN to see patient. Evaluation completed with PT collaboration. Upon arrival patient supine with HOB slightly elevated. Supine to sit. STS pivot to bedside chair. UB dressing as above. Pt demo's F retention of learning re sternal precautions, reinforcement needed, literature provided. Patient properly positioneto improve interaction with environment, overall functioning and decrease/prevent edema and contractures. After session, patient in position as stated above with all devices within reach, all lines and tubes intact, nursing notified.      Pt required cues and education as noted above for safe facilitation and completion of tasks. During functional activites and ADLs pt educated on proper hand placement, safety technique, sequencing, and energy conservation techniques. Therapist provided skilled monitoring of HR, O2 saturation, blood pressure and patient's response during treatment session. Prior to and at the end of session, environmental modifications/line management completed for patients safety and efficiency of treatment session. Eval Complexity:   · Medium Complexity  · History: Expanded review of medical records and additional review of physical, cognitive, or psychosocial history related to current functional performance  · Exam: 3+ performance deficits  · Assistance/Modification: Min/mod assistance or modifications required to perform tasks. May have comorbidities that affect occupational performance. Assessment of current deficits   Functional mobility [x]  ADLs [x] Strength []  Cognition []  Functional transfers  [x] IADLs [x] Safety Awareness []  Endurance [x]  Fine Motor Coordination [] Balance [x] Vision/perception [] Sensation []   Gross Motor Coordination [] ROM [x] Delirium []                  Motor Control []    Plan of Care:  ADL retraining [x]   Equipment needs [x]   Neuromuscular re-education [] Energy Conservation Techniques [x]  Functional Transfer training [x] Patient and/or Family Education [x]  Functional Mobility training [x]  Environmental Modifications [x]  Cognitive re-training []   Compensatory techniques for ADLs [x]  Splinting Needs []   Positioning to improve overall function [x]   Therapeutic Activity [x]                       Therapeutic Exercise  [x]  Visual/Perceptual: []    Delirium prevention/treatment  [x]   Other:  []    Rehab Potential: Good for established goals    Patient / Family Goal: None stated     Patient and/or family were instructed/educated on diagnosis, prognosis/goals and plan of care.  Demonstrated G understanding, further information not needed. [] Malnutrition indicators have been identified and nursing has been notified to ensure a dietitian consult is ordered.       Medium Complexity Evaluation + 30 min timed tx     Tx time in: 0835  Tx time out: [place], OTR/L  [id number]

## 2019-12-24 NOTE — PROGRESS NOTES
Physical Therapy    Initial Assessment     Name: Reagan Simmonds  : 1950  MRN: [id number]    Date of Service: 2019    Evaluating PT:  Akash Márquez, PT, DPT [id number]    Room #:  1934/3385-P  Diagnosis:  CAD in native artery   Procedure:  Sternotomy, CABG x3   PMHx:  Chronic back pain, esophagitis, cardiomegaly, basal cell carcinoma, PNA, sebaceous cyst, HLD, HTN, obesity, OA, sleep apnea, stroke   Precautions:  Falls, Sternal precautions, KAUSHAL drain x2, ANDRY, External pacemaker, O2   Equipment Needs:  TBD    Pt goes by \"Rand-O\"     Pt lives with wife in a 1 story home with 3 stairs to enter and 2 rail(s). Bedroom and bathroom are on the first level. Pt ambulated with no AD, independent, drives, works as a  PTA. Pt has full flight with 2 rails to basement laundry but wife will be able to do all the laundry during recovery. Initial Evaluation  Date: 19  Treatment Short Term/ Long Term   Goals   AM-PAC 6 Clicks 78/79     Was pt agreeable to Eval/treatment? Yes     Does pt have pain? /10 sternal pain      Bed Mobility  Rolling: NT  Supine to sit: Max A with HOB raised   Sit to supine: NT  Scooting: Max A to EOB  Rolling: Independent   Supine to sit: Independent   Sit to supine: Independent   Scooting: Independent    Transfers Sit to stand: Mod A from bed; Max A from chair   Stand to sit: Mod A  Stand pivot: Min A  Sit to stand: Independent   Stand to sit:  Independent   Stand pivot: Independent    Ambulation    5 feet with no AD Min A  >300 feet with no AD SBA   Stair negotiation: ascended and descended  NT   >3 steps with 0 rail Min A   ROM BUE:  Per OT eval   BLE:  WFL      Strength BUE:  Per OT eval   BLE:  WFL     Balance Sitting EOB:  SBA  Dynamic Standing:  Min A with no AD  Sitting EOB:  Independent   Dynamic Standing:  Independent      Pt is A & O x 4  RASS:  0  CAM-ICU:  NT  Sensation:  Pt reports intermittent numbness and tingling to B fingers/toes   Edema: WNL    Vitals:  Blood Pressure at rest 126/70 Blood Pressure post session 119/67   Heart Rate at rest 72 bpm Heart Rate post session 77 bpm   SPO2 at rest 95% on 5 L  SPO2 post session 93% on 5 L     Functional Status Score-Intensive Care Unit (FSS-ICU)   Rolling 2/7   Supine to sit transfer 2/7   Unsupported sitting  5/7   Sit to stand transfers 2/7   Ambulation 1/7   Total  12/35     Patient education  Pt educated on PT role, safety during functional mobility, sternal precautions, bed mobility training, sit<>stand safety/sequencing, transfer training, deep breathing, PLB, avoiding valsalva, lower extremity therapeutic exercises      Patient response to education:   Pt verbalized understanding Pt demonstrated skill Pt requires further education in this area   Yes  Yes Yes // Reinforce      Comments:  Pt received supine and agreeable to PT evaluation with OT collaboration. Pt cleared for participation by RN prior to session. Vitals monitored during session. Pt educated on sternal precautions prior to mobility. Requires assist of trunk and BLE during supine>sit with HOB raised. Pt instructed on not holding breath and appropriate breathing pattern. Reports mild lt headedness at EOB. Vitals stable. Assisted with scooting. Performed STS and short distance ambulation to bedside chair. Performed second STS from bedside chair requiring increased assistance. Pt educated on B hip marching, B LAQs, B ankle pumps, and B ankle circumduction for prevention and control of BLE edema. Pt left sitting up in chiar with call button in reach, lines attached, and needs met. RN updated. Pts/ family goals   1. Home with wife     Patient and or family understand(s) diagnosis, prognosis, and plan of care. Yes     PLAN  PT care will be provided in accordance with the objectives noted above. Whenever appropriate, clear delegation orders will be provided for nursing staff.   Exercises and functional mobility practice will be used as well as appropriate assistive devices or modalities to obtain goals. Patient and family education will also be administered as needed. Frequency of treatments: 2-5x/week x 7-10 days.     Time in  0830  Time out  [address], PT, DPT  [id number]

## 2019-12-24 NOTE — PROGRESS NOTES
[facility] Progress Note    Name: Luiza Hendrix  MRN: [id number]    CC: Postoperative Critical Care Management     Indication for Surgery/Procedure: UA,CAD  LVEF:  NML    RVF:  NML     Important/Relevant PMH/PSH: BEBETO, HPD, HTN, Obesity, CVA, chronic back pain, former smoker      Procedure/Surgeries: 12/23/2019 Sternotomy/CABG x 3 (LIMA-LAD, SVG-RI, SVG-OM)/EVH LLE/Rigid internal fixation of the sternum with SternaLock plates x 2     Pacing wires:  Ventricular         Intake/Output Summary (Last 24 hours) at 12/24/2019 0809  Last data filed at 12/24/2019 0700  Gross per 24 hour   Intake 5918.82 ml   Output 4514 ml   Net 1404.82 ml       Recent Labs     12/23/19  1245 12/24/19  0436   WBC 14.9* 8.9   HGB 13.3 12.3*   HCT 41.4 38.4    185      Lab Results   Component Value Date     12/24/2019    K 3.9 12/24/2019    K 4.2 12/19/2019     12/24/2019    CO2 22 12/24/2019    BUN 19 12/24/2019    CREATININE 1.0 12/24/2019    GLUCOSE 140 12/24/2019    GLUCOSE 100 01/20/2012    CALCIUM 8.5 12/24/2019      Recent Labs     12/23/19  1245 12/24/19  0436   BUN 15 19   CREATININE 0.8 1.0       Physical Exam:    /69   Pulse 67   Temp 98.6 °F (37 °C) (Bladder)   Resp 23   Ht 6' (1.829 m)   Wt 265 lb (120.2 kg)   SpO2 93%   BMI 35.94 kg/m²       CXR Findings: 12/24/2019        FINDINGS:    There has been removal of the ET and NG tubes. Left-sided chest tube and left basilar infiltrate and effusion are unchanged. Right lung appears clear. General: Awake, alert. Pain with deep breaths   Eyes: PERRL, anicteric   Pulmonary: Diminished bibasilar L>R. No wheezes, no accessory muscle use noted   Cardiovascular:  RRR, no heaves or thrills on palpation  Tele: SR  Abdomen: Soft, nontender, + BS   Extremities: Palpable pulses all extremities, no edema   Neurologic/Psych: A&Ox3, PEOPLES to command   Skin: Warm and dry  Incisions: MSI with jay dressing intact, LLE SVG with ace wrap on      Assessment/Plan: POD #1  1. CAD S/p CABG x 3 (LIMA-LAD, SVG-RI, SVG-OM)  -DAPT, crestor, metoprolol     -Ancef   -MS chest tube removed without difficulty, patient tolerated well. Pleural drains placed to bulb sxn      2. Acute Postoperative Respiratory Insufficiency  -2/2 surgery  -Extubated yesterday to bipap.   -On 5L NC, spo2~96%. Needs encouragement on SMI pulling only around 500. CXR congested, lasix ordered.   -Hx of BEBETO-continue nocturnal cpap and prn during naps--family to bring in machine      3. Acute Post Operative Pain   -prn oxycodone and IV toradol      4.  HTN  -Low dose metoprolol started, uptitrate as able     Dispo: transfer to [facility] signed by DESTINEE Vasques - CNP on 12/24/2019 at 8:09 AM

## 2019-12-24 NOTE — PROGRESS NOTES
New admission received from [facility], Initial assessment performed and documented. Skin assessment was completed and confirmed with another RN. Skin assessment paperwork was completed and placed in the soft chart. Orientated patient to room and nurse call light. Pt arrived to [facility]. Ibarra was removed. DVT 1204-4028. Urinal given to patient.

## 2019-12-24 NOTE — PLAN OF CARE
Problem: Falls - Risk of:  Goal: Will remain free from falls  Description  Will remain free from falls  Outcome: Met This Shift     Problem: Cardiac Output - Decreased:  Goal: Hemodynamic stability will improve  Description  Hemodynamic stability will improve  Outcome: Met This Shift     Problem: Fluid Volume - Imbalance:  Goal: Chest tube drainage is within specified parameters  Description  Chest tube drainage is within specified parameters  Outcome: Met This Shift     Problem: Gas Exchange - Impaired:  Goal: Levels of oxygenation will improve  Description  Levels of oxygenation will improve  Outcome: Met This Shift

## 2019-12-24 NOTE — OP NOTE
using SternaLock plates x2. HISTORY:  This is a 28-year-old man who is having increasing angina. He  underwent a stress test, which  was abnormal and had a cardiac cath,  which showed severe multivessel coronary artery disease including  of  the LAD. He was referred for coronary artery bypass grafting. The  patient was described the procedure in full including the risks and  complications including but not limited to bleeding, infection, need for  operation, hemothorax, pneumothorax, stroke, myocardial infarction, and  death and the patient agreed to proceed. FINDINGS:  Preoperative SHANTELL revealed preserved ejection fraction without  any significant valvular abnormalities. Intraoperatively, left internal  mammary was a good conduit for bypass. The vein was a good conduit for  bypass. The LAD was chronically totally occluded vessel, thick walled,  but still about 2 mm vessel and good target for bypass. The obtuse  margin was a 1.8 mm vessel and a fair target for bypass. The ramus  intermedius was a 1.8 mm vessel and a fair target for bypass. The  patient  from cardiopulmonary bypass without the assistance of  inotropic support. Postoperative SHANTELL revealed preserved ejection  fraction without any new valvular abnormalities. All sponge,  instrument, and needle counts were correct at the end of the case. DESCRIPTION OF OPERATION:  After adequate informed consent was obtained  and adequate preoperative antibiotics were given, the patient was  brought to the operating room in stable condition. He was laid in the  supine position and induced under general endotracheal anesthesia by  anesthesia staff. Ibarra catheter and adequate monitoring lines were  placed. The patient's chest, abdomen, pelvis, and lower extremities  were prepped and draped in the usual sterile fashion.   Left lower  extremity greater saphenous vein was harvested in an endoscopic  technique and prepared on the back table for anastomosis. These wounds  were closed in multiple layers of absorbable stitch. Standard  sternotomy was performed. Left internal mammary artery was harvested in  a pedicle fashion. The patient was systemically heparinized. The  mammary was clipped distally and transected. Good pulsatile flow was  noted. It was injected with papaverine, clipped distally, and placed in  the left chest.  Standard retractor was placed. The pericardium was  entered and tacked to the chest wall. After ensuring adequate ACT, the  patient was instituted on a cardiopulmonary bypass by cannulating the  ascending aorta using an 18-Arabic aortic cannula and the right atrial  appendage using a small two-stage venous cannula. Retrograde catheter  and root vent were placed. The aorta was crossclamped. The heart was  arrested with cold blood antegrade and retrograde Buckberg cardioplegia. Topical ice was used on the right ventricle. Excellent diastolic arrest  was achieved. Cardioplegia was given intermittently throughout the  remainder of the operation. The vein was brought into the field and  grafted to ramus intermedius using 7-0 Prolene. This was brought to the  ascending aorta, and proximal anastomosis was created using 6-0 Prolene. The vein was then grafted to the obtuse marginal using 7-0 Prolene. It  was brought to the ascending aorta and anastomosis was created between  the kendrick of the vein graft to the ramus intermedius and the vein graft  to the obtuse marginal using 7-0 Prolene. The mammary was brought into  the field and it was grafted to the LAD in its mid portion using 7-0  Prolene. Pedicle was tacked to the epicardium. Warm antegrade and  retrograde hotshot was given. The cross-clamp was released. The  patient turned to sinus rhythm. Ventricular pacing wire was placed.    Two drains were placed in the mediastinum, one in the left chest and one  in the right chest.  The patient was easily weaned from cardiopulmonary  bypass without the assistance any inotropic support. The patient was  decannulated in the usual fashion. Protamine was given. Mediastinum  was inspected for hemostasis. Hemostasis was achieved using Bovie  cautery and stitches. With the patient warm and hemodynamically stable  in sinus rhythm, I then closed the chest using #6 steel wire including  double wires. Because of the patient's morbid obesity with a BMI of 36,  he is at high risk for sternal wound infection, therefore, a rigid  internal fixation of the sternum using KLS plates x2. Remainder of the  wound was closed in multiple layers of absorbable stitch. ANDRY suction  dressing was applied over the top. The patient tolerated the procedure  well. The patient was transferred to the cardiothoracic intensive care  unit in stable but guarded condition. All sponge, instruments, and  needle counts were correct at the end of the case.         Jaquelin Schuster MD    D: 12/23/2019 12:58:40       T: 12/23/2019 13:02:01     [email or web address]  Job#: [id number]     Doc#: [id number]    CC:  DO Sandy Santos MD Veverly Challenger, MD Otilio Bjornstad, MD

## 2019-12-25 ENCOUNTER — APPOINTMENT (OUTPATIENT)
Dept: GENERAL RADIOLOGY | Age: 69
DRG: 236 | End: 2019-12-25
Attending: THORACIC SURGERY (CARDIOTHORACIC VASCULAR SURGERY)
Payer: MEDICARE

## 2019-12-25 LAB
ANION GAP SERPL CALCULATED.3IONS-SCNC: 14 MMOL/L (ref 7–16)
BUN BLDV-MCNC: 23 MG/DL (ref 8–23)
CALCIUM SERPL-MCNC: 9.2 MG/DL (ref 8.6–10.2)
CHLORIDE BLD-SCNC: 98 MMOL/L (ref 98–107)
CO2: 23 MMOL/L (ref 22–29)
CREAT SERPL-MCNC: 0.9 MG/DL (ref 0.7–1.2)
GFR AFRICAN AMERICAN: >60
GFR NON-AFRICAN AMERICAN: >60 ML/MIN/1.73
GLUCOSE BLD-MCNC: 134 MG/DL (ref 74–99)
HCT VFR BLD CALC: 41.2 % (ref 37–54)
HEMOGLOBIN: 13 G/DL (ref 12.5–16.5)
MAGNESIUM: 2.2 MG/DL (ref 1.6–2.6)
MCH RBC QN AUTO: 33.1 PG (ref 26–35)
MCHC RBC AUTO-ENTMCNC: 31.6 % (ref 32–34.5)
MCV RBC AUTO: 104.8 FL (ref 80–99.9)
METER GLUCOSE: 113 MG/DL (ref 74–99)
METER GLUCOSE: 129 MG/DL (ref 74–99)
METER GLUCOSE: 145 MG/DL (ref 74–99)
METER GLUCOSE: 151 MG/DL (ref 74–99)
PDW BLD-RTO: 12.4 FL (ref 11.5–15)
PLATELET # BLD: 182 E9/L (ref 130–450)
PMV BLD AUTO: 11.1 FL (ref 7–12)
POTASSIUM SERPL-SCNC: 3.7 MMOL/L (ref 3.5–5)
RBC # BLD: 3.93 E12/L (ref 3.8–5.8)
SODIUM BLD-SCNC: 135 MMOL/L (ref 132–146)
WBC # BLD: 12.4 E9/L (ref 4.5–11.5)

## 2019-12-25 PROCEDURE — 71045 X-RAY EXAM CHEST 1 VIEW: CPT

## 2019-12-25 PROCEDURE — 2700000000 HC OXYGEN THERAPY PER DAY

## 2019-12-25 PROCEDURE — 2580000003 HC RX 258: Performed by: NURSE PRACTITIONER

## 2019-12-25 PROCEDURE — 94640 AIRWAY INHALATION TREATMENT: CPT

## 2019-12-25 PROCEDURE — 6360000002 HC RX W HCPCS: Performed by: NURSE PRACTITIONER

## 2019-12-25 PROCEDURE — 6370000000 HC RX 637 (ALT 250 FOR IP): Performed by: NURSE PRACTITIONER

## 2019-12-25 PROCEDURE — C1751 CATH, INF, PER/CENT/MIDLINE: HCPCS

## 2019-12-25 PROCEDURE — 85027 COMPLETE CBC AUTOMATED: CPT

## 2019-12-25 PROCEDURE — 76937 US GUIDE VASCULAR ACCESS: CPT

## 2019-12-25 PROCEDURE — 82962 GLUCOSE BLOOD TEST: CPT

## 2019-12-25 PROCEDURE — 94669 MECHANICAL CHEST WALL OSCILL: CPT

## 2019-12-25 PROCEDURE — 2140000000 HC CCU INTERMEDIATE R&B

## 2019-12-25 PROCEDURE — 02HV33Z INSERTION OF INFUSION DEVICE INTO SUPERIOR VENA CAVA, PERCUTANEOUS APPROACH: ICD-10-PCS | Performed by: THORACIC SURGERY (CARDIOTHORACIC VASCULAR SURGERY)

## 2019-12-25 PROCEDURE — P9041 ALBUMIN (HUMAN),5%, 50ML: HCPCS | Performed by: NURSE PRACTITIONER

## 2019-12-25 PROCEDURE — 80048 BASIC METABOLIC PNL TOTAL CA: CPT

## 2019-12-25 PROCEDURE — 36569 INSJ PICC 5 YR+ W/O IMAGING: CPT

## 2019-12-25 PROCEDURE — 36415 COLL VENOUS BLD VENIPUNCTURE: CPT

## 2019-12-25 PROCEDURE — 51798 US URINE CAPACITY MEASURE: CPT

## 2019-12-25 PROCEDURE — 83735 ASSAY OF MAGNESIUM: CPT

## 2019-12-25 RX ORDER — BISACODYL 10 MG
10 SUPPOSITORY, RECTAL RECTAL DAILY
Status: DISCONTINUED | OUTPATIENT
Start: 2019-12-26 | End: 2019-12-27 | Stop reason: HOSPADM

## 2019-12-25 RX ORDER — ALBUMIN, HUMAN INJ 5% 5 %
25 SOLUTION INTRAVENOUS ONCE
Status: COMPLETED | OUTPATIENT
Start: 2019-12-25 | End: 2019-12-25

## 2019-12-25 RX ORDER — FUROSEMIDE 10 MG/ML
20 INJECTION INTRAMUSCULAR; INTRAVENOUS 2 TIMES DAILY
Status: DISCONTINUED | OUTPATIENT
Start: 2019-12-25 | End: 2019-12-27 | Stop reason: HOSPADM

## 2019-12-25 RX ORDER — AMIODARONE HYDROCHLORIDE 200 MG/1
400 TABLET ORAL 3 TIMES DAILY
Status: DISCONTINUED | OUTPATIENT
Start: 2019-12-26 | End: 2019-12-27 | Stop reason: HOSPADM

## 2019-12-25 RX ADMIN — INSULIN LISPRO 1 UNITS: 100 INJECTION, SOLUTION INTRAVENOUS; SUBCUTANEOUS at 12:15

## 2019-12-25 RX ADMIN — CEFAZOLIN SODIUM 3 G: 10 INJECTION, POWDER, FOR SOLUTION INTRAVENOUS at 04:06

## 2019-12-25 RX ADMIN — FOLIC ACID 1 MG: 1 TABLET ORAL at 08:57

## 2019-12-25 RX ADMIN — HYDROCODONE BITARTRATE AND ACETAMINOPHEN 2 TABLET: 5; 325 TABLET ORAL at 00:44

## 2019-12-25 RX ADMIN — DOCUSATE SODIUM 100 MG: 100 CAPSULE, LIQUID FILLED ORAL at 08:57

## 2019-12-25 RX ADMIN — METOPROLOL TARTRATE 25 MG: 25 TABLET ORAL at 20:09

## 2019-12-25 RX ADMIN — DEXTROSE MONOHYDRATE 150 MG: 50 INJECTION, SOLUTION INTRAVENOUS at 12:38

## 2019-12-25 RX ADMIN — Medication 10 ML: at 08:57

## 2019-12-25 RX ADMIN — Medication 500 MG: at 20:08

## 2019-12-25 RX ADMIN — CLOPIDOGREL 75 MG: 75 TABLET, FILM COATED ORAL at 08:57

## 2019-12-25 RX ADMIN — METOPROLOL TARTRATE 25 MG: 25 TABLET ORAL at 08:57

## 2019-12-25 RX ADMIN — Medication 500 MG: at 08:57

## 2019-12-25 RX ADMIN — SODIUM CHLORIDE, PRESERVATIVE FREE 10 ML: 5 INJECTION INTRAVENOUS at 12:31

## 2019-12-25 RX ADMIN — HYDROCODONE BITARTRATE AND ACETAMINOPHEN 2 TABLET: 5; 325 TABLET ORAL at 22:22

## 2019-12-25 RX ADMIN — HYDROCODONE BITARTRATE AND ACETAMINOPHEN 2 TABLET: 5; 325 TABLET ORAL at 05:08

## 2019-12-25 RX ADMIN — FERROUS SULFATE TAB 325 MG (65 MG ELEMENTAL FE) 325 MG: 325 (65 FE) TAB at 17:46

## 2019-12-25 RX ADMIN — IPRATROPIUM BROMIDE AND ALBUTEROL SULFATE 1 AMPULE: 2.5; .5 SOLUTION RESPIRATORY (INHALATION) at 13:21

## 2019-12-25 RX ADMIN — ASPIRIN 81 MG: 81 TABLET, COATED ORAL at 08:57

## 2019-12-25 RX ADMIN — AMIODARONE HYDROCHLORIDE 0.5 MG/MIN: 50 INJECTION, SOLUTION INTRAVENOUS at 22:24

## 2019-12-25 RX ADMIN — MUPIROCIN: 20 OINTMENT TOPICAL at 20:12

## 2019-12-25 RX ADMIN — AMIODARONE HYDROCHLORIDE 1 MG/MIN: 50 INJECTION, SOLUTION INTRAVENOUS at 12:38

## 2019-12-25 RX ADMIN — HYDROCODONE BITARTRATE AND ACETAMINOPHEN 2 TABLET: 5; 325 TABLET ORAL at 10:03

## 2019-12-25 RX ADMIN — ROSUVASTATIN CALCIUM 10 MG: 20 TABLET, FILM COATED ORAL at 20:09

## 2019-12-25 RX ADMIN — FERROUS SULFATE TAB 325 MG (65 MG ELEMENTAL FE) 325 MG: 325 (65 FE) TAB at 08:57

## 2019-12-25 RX ADMIN — MAGNESIUM GLUCONATE 500 MG ORAL TABLET 400 MG: 500 TABLET ORAL at 10:03

## 2019-12-25 RX ADMIN — KETOROLAC TROMETHAMINE 15 MG: 30 INJECTION, SOLUTION INTRAMUSCULAR; INTRAVENOUS at 12:31

## 2019-12-25 RX ADMIN — MUPIROCIN: 20 OINTMENT TOPICAL at 08:56

## 2019-12-25 RX ADMIN — TAMSULOSIN HYDROCHLORIDE 0.4 MG: 0.4 CAPSULE ORAL at 08:57

## 2019-12-25 RX ADMIN — IPRATROPIUM BROMIDE AND ALBUTEROL SULFATE 1 AMPULE: 2.5; .5 SOLUTION RESPIRATORY (INHALATION) at 09:41

## 2019-12-25 RX ADMIN — Medication 10 ML: at 20:11

## 2019-12-25 RX ADMIN — ENOXAPARIN SODIUM 40 MG: 40 INJECTION SUBCUTANEOUS at 08:57

## 2019-12-25 RX ADMIN — ALBUMIN (HUMAN) 25 G: 12.5 INJECTION, SOLUTION INTRAVENOUS at 14:52

## 2019-12-25 RX ADMIN — PANTOPRAZOLE SODIUM 40 MG: 40 TABLET, DELAYED RELEASE ORAL at 08:57

## 2019-12-25 RX ADMIN — INSULIN LISPRO 1 UNITS: 100 INJECTION, SOLUTION INTRAVENOUS; SUBCUTANEOUS at 17:19

## 2019-12-25 RX ADMIN — FUROSEMIDE 20 MG: 10 INJECTION, SOLUTION INTRAMUSCULAR; INTRAVENOUS at 12:19

## 2019-12-25 RX ADMIN — POTASSIUM CHLORIDE 40 MEQ: 1500 TABLET, EXTENDED RELEASE ORAL at 12:49

## 2019-12-25 RX ADMIN — HYDROCODONE BITARTRATE AND ACETAMINOPHEN 2 TABLET: 5; 325 TABLET ORAL at 17:53

## 2019-12-25 RX ADMIN — FUROSEMIDE 20 MG: 10 INJECTION, SOLUTION INTRAMUSCULAR; INTRAVENOUS at 17:46

## 2019-12-25 ASSESSMENT — PAIN SCALES - GENERAL
PAINLEVEL_OUTOF10: 0
PAINLEVEL_OUTOF10: 7
PAINLEVEL_OUTOF10: 2
PAINLEVEL_OUTOF10: 0
PAINLEVEL_OUTOF10: 0
PAINLEVEL_OUTOF10: 8
PAINLEVEL_OUTOF10: 0
PAINLEVEL_OUTOF10: 9
PAINLEVEL_OUTOF10: 6
PAINLEVEL_OUTOF10: 7
PAINLEVEL_OUTOF10: 0
PAINLEVEL_OUTOF10: 7
PAINLEVEL_OUTOF10: 7

## 2019-12-25 ASSESSMENT — PAIN - FUNCTIONAL ASSESSMENT
PAIN_FUNCTIONAL_ASSESSMENT: ACTIVITIES ARE NOT PREVENTED

## 2019-12-25 ASSESSMENT — PAIN DESCRIPTION - ORIENTATION
ORIENTATION: MID

## 2019-12-25 ASSESSMENT — PAIN DESCRIPTION - PAIN TYPE
TYPE: SURGICAL PAIN

## 2019-12-25 ASSESSMENT — PAIN DESCRIPTION - LOCATION
LOCATION: STERNUM

## 2019-12-25 ASSESSMENT — PAIN DESCRIPTION - DESCRIPTORS
DESCRIPTORS: CONSTANT;DISCOMFORT
DESCRIPTORS: ACHING;SORE;DISCOMFORT
DESCRIPTORS: ACHING;SORE;DISCOMFORT
DESCRIPTORS: DISCOMFORT
DESCRIPTORS: ACHING;DISCOMFORT;DULL

## 2019-12-25 ASSESSMENT — PAIN DESCRIPTION - ONSET
ONSET: ON-GOING

## 2019-12-25 ASSESSMENT — PAIN DESCRIPTION - FREQUENCY
FREQUENCY: INTERMITTENT

## 2019-12-25 ASSESSMENT — PAIN DESCRIPTION - PROGRESSION
CLINICAL_PROGRESSION: GRADUALLY WORSENING

## 2019-12-25 NOTE — PLAN OF CARE
Problem: Falls - Risk of:  Goal: Will remain free from falls  Description  Will remain free from falls  Outcome: Met This Shift  Goal: Absence of physical injury  Description  Absence of physical injury  Outcome: Met This Shift     Problem: Gas Exchange - Impaired:  Goal: Levels of oxygenation will improve  Description  Levels of oxygenation will improve  Outcome: Met This Shift  Goal: Ability to maintain adequate ventilation will improve  Description  Ability to maintain adequate ventilation will improve  Outcome: Met This Shift

## 2019-12-25 NOTE — PROGRESS NOTES
POD#2 Awake, alert. No complaints. Up in chair. Denies CP, palpitations, SOB at rest, dizziness/lightheadedness. Vitals:    12/24/19 1621 12/24/19 2042 12/25/19 0044 12/25/19 0457   BP: 104/68 (!) 105/58 105/69 122/72   Pulse: 77 78 70 71   Resp: 18 18 20 18   Temp: 99 °F (37.2 °C) 98.9 °F (37.2 °C) 99 °F (37.2 °C) 99.6 °F (37.6 °C)   TempSrc: Temporal Temporal Temporal Temporal   SpO2:  94% 94% 96%   Weight:    265 lb 1.6 oz (120.2 kg)   Height:         O2: 4L/NC      Intake/Output Summary (Last 24 hours) at 12/25/2019 0732  Last data filed at 12/25/2019 0457  Gross per 24 hour   Intake 1424 ml   Output 1990 ml   Net -566 ml         UO: 0mL/8hr --dtv--urinary retention--leblanc to be reinserted  CT output: Left pleural: 20mL/8hr (80mL/24hrs)     Right Pleural: 25mL/8hr (185mL/24hrs)      Recent Labs     12/23/19  1245 12/24/19  0436   WBC 14.9* 8.9   HGB 13.3 12.3*   HCT 41.4 38.4    185      Recent Labs     12/23/19  1245 12/24/19  0436   BUN 15 19   CREATININE 0.8 1.0         Telemetry: afib rvr      PE  Cardiac: IRRR  Lungs: decreased bases  Chest incision with intact ANDRY DSD. Sternum stable. Prior chest tube site incisions C/D/I, no erythema with intact sutures. Chest tubes x 2 and Epicardial pacing wires present and secure. Abd: Soft, nontender, +BS, +flatus  Ext: Incisions C/D/I, approximated, no erythema, + edema.  Left hand swelling present      A/P: Stable s/p CABG x 3, POD#2  Acute blood loss anemia secondary to open heart surgery--stable  Scr stable--continue diuresis--bid lasix  PAF--up titrate BB, insert PICC, amio bolus then infusion with oral amio to begin tomorrow--check mag level   Continue chest tubes today, likely remove tomorrow  Prolonged postoperative respiratory insufficiency, continue duonebs with ezpap, encourage C&DB/SMI, diurese  Hx of BEBETO--continue nocturnal cpap and prn during naps  Urinary retention--will require re-insertion of leblanc catheter this AM--on flomax since 12/24--continue leblanc catheter until more ambulatory, at which time a voiding trial will be done  Constipation--no BM since prior to surgery--Continue MOM, colace, and senna QHS. Will give daily suppository starting tomorrow if no BM by then. Encouraged continued increase in oral intake and activity. Add lovenox for dvt prophylaxis.  Continue knee high MARI hose/pcds/progressive ambulation   Elevate left upper extremity on pillows--hand swelling--likely d/t iv infiltration--no erythema--monitor  Increase activity as tolerated  Encourage incentive spirometry  This patient's case and care plan was discussed with the attending surgeon

## 2019-12-25 NOTE — PROGRESS NOTES
CTA NP, Nicholas notified of hypotension, 84/42. Patient not symptomatic, but is diaphoretic at this time. Patient remains in Atrial fibrillation RVR with amiodarone drip running at 33.3ml/hr. Orders obtained.

## 2019-12-25 NOTE — PLAN OF CARE
Problem: Falls - Risk of:  Goal: Will remain free from falls  Description  Will remain free from falls  12/25/2019 1640 by Coleman Queen RN  Outcome: Met This Shift  12/25/2019 1315 by Apolinar Bond RN  Outcome: Met This Shift     Problem: Falls - Risk of:  Goal: Absence of physical injury  Description  Absence of physical injury  12/25/2019 1315 by Apolinar Bond RN  Outcome: Met This Shift     Problem:  Activity Intolerance:  Goal: Able to perform prescribed physical activity  Description  Able to perform prescribed physical activity  Outcome: Met This Shift     Problem: Cardiac Output - Decreased:  Goal: Cardiac output within specified parameters  Description  Cardiac output within specified parameters  Outcome: Met This Shift

## 2019-12-26 LAB
ANION GAP SERPL CALCULATED.3IONS-SCNC: 14 MMOL/L (ref 7–16)
BUN BLDV-MCNC: 18 MG/DL (ref 8–23)
CALCIUM SERPL-MCNC: 9.2 MG/DL (ref 8.6–10.2)
CHLORIDE BLD-SCNC: 98 MMOL/L (ref 98–107)
CO2: 25 MMOL/L (ref 22–29)
CREAT SERPL-MCNC: 0.8 MG/DL (ref 0.7–1.2)
GFR AFRICAN AMERICAN: >60
GFR NON-AFRICAN AMERICAN: >60 ML/MIN/1.73
GLUCOSE BLD-MCNC: 121 MG/DL (ref 74–99)
HCT VFR BLD CALC: 37.8 % (ref 37–54)
HEMOGLOBIN: 11.9 G/DL (ref 12.5–16.5)
MCH RBC QN AUTO: 32.3 PG (ref 26–35)
MCHC RBC AUTO-ENTMCNC: 31.5 % (ref 32–34.5)
MCV RBC AUTO: 102.7 FL (ref 80–99.9)
METER GLUCOSE: 119 MG/DL (ref 74–99)
METER GLUCOSE: 139 MG/DL (ref 74–99)
METER GLUCOSE: 180 MG/DL (ref 74–99)
PDW BLD-RTO: 12.1 FL (ref 11.5–15)
PLATELET # BLD: 193 E9/L (ref 130–450)
PMV BLD AUTO: 11.2 FL (ref 7–12)
POTASSIUM SERPL-SCNC: 4 MMOL/L (ref 3.5–5)
RBC # BLD: 3.68 E12/L (ref 3.8–5.8)
SODIUM BLD-SCNC: 137 MMOL/L (ref 132–146)
WBC # BLD: 12 E9/L (ref 4.5–11.5)

## 2019-12-26 PROCEDURE — 6370000000 HC RX 637 (ALT 250 FOR IP): Performed by: NURSE PRACTITIONER

## 2019-12-26 PROCEDURE — 94640 AIRWAY INHALATION TREATMENT: CPT

## 2019-12-26 PROCEDURE — 36592 COLLECT BLOOD FROM PICC: CPT

## 2019-12-26 PROCEDURE — 2140000000 HC CCU INTERMEDIATE R&B

## 2019-12-26 PROCEDURE — 6360000002 HC RX W HCPCS: Performed by: NURSE PRACTITIONER

## 2019-12-26 PROCEDURE — 93798 PHYS/QHP OP CAR RHAB W/ECG: CPT

## 2019-12-26 PROCEDURE — 80048 BASIC METABOLIC PNL TOTAL CA: CPT

## 2019-12-26 PROCEDURE — 2580000003 HC RX 258: Performed by: NURSE PRACTITIONER

## 2019-12-26 PROCEDURE — 2700000000 HC OXYGEN THERAPY PER DAY

## 2019-12-26 PROCEDURE — 82962 GLUCOSE BLOOD TEST: CPT

## 2019-12-26 PROCEDURE — 85027 COMPLETE CBC AUTOMATED: CPT

## 2019-12-26 PROCEDURE — 94669 MECHANICAL CHEST WALL OSCILL: CPT

## 2019-12-26 PROCEDURE — 36415 COLL VENOUS BLD VENIPUNCTURE: CPT

## 2019-12-26 PROCEDURE — 6370000000 HC RX 637 (ALT 250 FOR IP): Performed by: INTERNAL MEDICINE

## 2019-12-26 RX ORDER — METOPROLOL SUCCINATE 25 MG/1
25 TABLET, EXTENDED RELEASE ORAL 2 TIMES DAILY
Status: DISCONTINUED | OUTPATIENT
Start: 2019-12-26 | End: 2019-12-27 | Stop reason: HOSPADM

## 2019-12-26 RX ADMIN — HYDROCODONE BITARTRATE AND ACETAMINOPHEN 2 TABLET: 5; 325 TABLET ORAL at 02:15

## 2019-12-26 RX ADMIN — HYDROCODONE BITARTRATE AND ACETAMINOPHEN 2 TABLET: 5; 325 TABLET ORAL at 07:55

## 2019-12-26 RX ADMIN — INSULIN LISPRO 1 UNITS: 100 INJECTION, SOLUTION INTRAVENOUS; SUBCUTANEOUS at 13:36

## 2019-12-26 RX ADMIN — POTASSIUM CHLORIDE 20 MEQ: 1500 TABLET, EXTENDED RELEASE ORAL at 10:43

## 2019-12-26 RX ADMIN — FERROUS SULFATE TAB 325 MG (65 MG ELEMENTAL FE) 325 MG: 325 (65 FE) TAB at 10:41

## 2019-12-26 RX ADMIN — ENOXAPARIN SODIUM 40 MG: 40 INJECTION SUBCUTANEOUS at 10:55

## 2019-12-26 RX ADMIN — MUPIROCIN: 20 OINTMENT TOPICAL at 10:57

## 2019-12-26 RX ADMIN — FERROUS SULFATE TAB 325 MG (65 MG ELEMENTAL FE) 325 MG: 325 (65 FE) TAB at 19:09

## 2019-12-26 RX ADMIN — IPRATROPIUM BROMIDE AND ALBUTEROL SULFATE 1 AMPULE: 2.5; .5 SOLUTION RESPIRATORY (INHALATION) at 15:36

## 2019-12-26 RX ADMIN — HYDROCODONE BITARTRATE AND ACETAMINOPHEN 1 TABLET: 5; 325 TABLET ORAL at 23:40

## 2019-12-26 RX ADMIN — AMIODARONE HYDROCHLORIDE 400 MG: 200 TABLET ORAL at 21:57

## 2019-12-26 RX ADMIN — METOPROLOL SUCCINATE 25 MG: 25 TABLET, EXTENDED RELEASE ORAL at 10:42

## 2019-12-26 RX ADMIN — AMIODARONE HYDROCHLORIDE 400 MG: 200 TABLET ORAL at 10:42

## 2019-12-26 RX ADMIN — PANTOPRAZOLE SODIUM 40 MG: 40 TABLET, DELAYED RELEASE ORAL at 10:41

## 2019-12-26 RX ADMIN — IPRATROPIUM BROMIDE AND ALBUTEROL SULFATE 1 AMPULE: 2.5; .5 SOLUTION RESPIRATORY (INHALATION) at 10:19

## 2019-12-26 RX ADMIN — SODIUM CHLORIDE, PRESERVATIVE FREE 10 ML: 5 INJECTION INTRAVENOUS at 05:24

## 2019-12-26 RX ADMIN — CLOPIDOGREL 75 MG: 75 TABLET, FILM COATED ORAL at 10:56

## 2019-12-26 RX ADMIN — HYDROCODONE BITARTRATE AND ACETAMINOPHEN 2 TABLET: 5; 325 TABLET ORAL at 18:59

## 2019-12-26 RX ADMIN — METOPROLOL SUCCINATE 25 MG: 25 TABLET, EXTENDED RELEASE ORAL at 21:57

## 2019-12-26 RX ADMIN — SODIUM CHLORIDE, PRESERVATIVE FREE 10 ML: 5 INJECTION INTRAVENOUS at 05:22

## 2019-12-26 RX ADMIN — AMIODARONE HYDROCHLORIDE 400 MG: 200 TABLET ORAL at 19:01

## 2019-12-26 RX ADMIN — FUROSEMIDE 20 MG: 10 INJECTION, SOLUTION INTRAMUSCULAR; INTRAVENOUS at 10:39

## 2019-12-26 RX ADMIN — FOLIC ACID 1 MG: 1 TABLET ORAL at 10:40

## 2019-12-26 RX ADMIN — SODIUM CHLORIDE, PRESERVATIVE FREE 10 ML: 5 INJECTION INTRAVENOUS at 05:33

## 2019-12-26 RX ADMIN — TAMSULOSIN HYDROCHLORIDE 0.4 MG: 0.4 CAPSULE ORAL at 10:43

## 2019-12-26 RX ADMIN — KETOROLAC TROMETHAMINE 15 MG: 30 INJECTION, SOLUTION INTRAMUSCULAR; INTRAVENOUS at 05:23

## 2019-12-26 RX ADMIN — MUPIROCIN: 20 OINTMENT TOPICAL at 21:56

## 2019-12-26 RX ADMIN — IPRATROPIUM BROMIDE AND ALBUTEROL SULFATE 1 AMPULE: 2.5; .5 SOLUTION RESPIRATORY (INHALATION) at 19:28

## 2019-12-26 RX ADMIN — FUROSEMIDE 20 MG: 10 INJECTION, SOLUTION INTRAMUSCULAR; INTRAVENOUS at 19:09

## 2019-12-26 RX ADMIN — Medication 500 MG: at 10:39

## 2019-12-26 RX ADMIN — Medication 10 ML: at 11:07

## 2019-12-26 RX ADMIN — ASPIRIN 81 MG: 81 TABLET, COATED ORAL at 10:39

## 2019-12-26 RX ADMIN — MAGNESIUM GLUCONATE 500 MG ORAL TABLET 400 MG: 500 TABLET ORAL at 10:39

## 2019-12-26 RX ADMIN — Medication 500 MG: at 21:57

## 2019-12-26 RX ADMIN — Medication 10 ML: at 21:57

## 2019-12-26 RX ADMIN — DOCUSATE SODIUM 100 MG: 100 CAPSULE, LIQUID FILLED ORAL at 10:43

## 2019-12-26 RX ADMIN — HYDROCODONE BITARTRATE AND ACETAMINOPHEN 2 TABLET: 5; 325 TABLET ORAL at 12:38

## 2019-12-26 ASSESSMENT — PAIN SCALES - GENERAL
PAINLEVEL_OUTOF10: 9
PAINLEVEL_OUTOF10: 5
PAINLEVEL_OUTOF10: 8
PAINLEVEL_OUTOF10: 5
PAINLEVEL_OUTOF10: 6
PAINLEVEL_OUTOF10: 7
PAINLEVEL_OUTOF10: 7

## 2019-12-26 ASSESSMENT — PAIN DESCRIPTION - DESCRIPTORS
DESCRIPTORS: STABBING
DESCRIPTORS: ACHING;DISCOMFORT;SORE
DESCRIPTORS: DISCOMFORT;DULL;SORE
DESCRIPTORS: DISCOMFORT

## 2019-12-26 ASSESSMENT — PAIN DESCRIPTION - LOCATION
LOCATION: CHEST
LOCATION: CHEST
LOCATION: STERNUM
LOCATION: CHEST;INCISION

## 2019-12-26 ASSESSMENT — PAIN DESCRIPTION - PAIN TYPE
TYPE: SURGICAL PAIN
TYPE: ACUTE PAIN;SURGICAL PAIN
TYPE: SURGICAL PAIN
TYPE: SURGICAL PAIN

## 2019-12-26 ASSESSMENT — PAIN DESCRIPTION - ORIENTATION
ORIENTATION: MID

## 2019-12-26 ASSESSMENT — PAIN - FUNCTIONAL ASSESSMENT: PAIN_FUNCTIONAL_ASSESSMENT: PREVENTS OR INTERFERES SOME ACTIVE ACTIVITIES AND ADLS

## 2019-12-26 ASSESSMENT — PAIN DESCRIPTION - PROGRESSION: CLINICAL_PROGRESSION: GRADUALLY IMPROVING

## 2019-12-26 NOTE — PROGRESS NOTES
Nutrition Education    Type and Reason for Visit: Initial, Consult, Patient Education     Diet instruction completed on heart healthy / consistent carbohydrate diet. Multiple educational handouts provided including sample meal plans. Pt does admit to consuming fisher, soda pop, fast foods, etc.    · Verbally reviewed information with patient  · Written educational materials provided. · Contact name and number provided. · Refer to Patient Education activity for more details.     Electronically signed by Carl Das RD, LD on 12/26/19 at 3:42 PM    Contact Number: [phone]

## 2019-12-26 NOTE — PLAN OF CARE
Problem: Increased nutrient needs (NI-5.1)  Goal: Food and/or Nutrient Delivery  Description  Individualized approach for food/nutrient provision.   Outcome: Met This Shift

## 2019-12-26 NOTE — CARE COORDINATION
Met with pt and his wife in room. We discussed dc planning. Ambulated 200ft x 2 this am. Dc plan will be to home with [facility]. Pt's wife will be staying with him 24/7 after dc. Referral made to Nilay Pleitez at [facility] and they accepted pt.  Sw/dinesh will follow

## 2019-12-26 NOTE — PROGRESS NOTES
neurologic deficit. No hemoptysis, epistaxis, easy bruising. No anxiety, depression. No symptoms of hypothyroidism, hyperthyroidism, diabetes, heat or cold intolerance. FAMILY HISTORY: negative for CAD in first-degree relatives. SOCIAL HISTORY: negative for alcohol, tobacco, or illicit drug use. PHYSICAL EXAM:    General Appearance:  awake, alert, oriented, in no acute distress  Neck:  no bruits  Lungs:  Normal expansion. Clear to auscultation. No rales, rhonchi, or wheezing. Heart:  Heart sounds are normal.  Regular rate and rhythm without murmur, gallop or rub. Abdomen:  Soft, non-tender. Extremities: Extremities warm to touch, pink, with no edema. Neuro/musculosketal:  Unremarkable. LABS:    Recent Labs     12/24/19  0436 12/25/19  0758 12/26/19  0530    135 137   CREATININE 1.0 0.9 0.8       Recent Labs     12/24/19  0436 12/25/19  0758 12/26/19  0530   HGB 12.3* 13.0 11.9*       Recent Labs     12/23/19  1245   INR 1.2         IMPRESSION:    1.  CAD post CABG- doing well at this time and no further dyspnea (his anginal equivalent). Continue indefinite aspirin. Ambulate on the floor once rate is controlled and/or he converts to sinus rhythm. Physical therapy. 2.  Atrial fibrillation- on IV amiodarone infusion at this time along with oral amiodarone. Continue to monitor on telemetry and hold off on anticoagulation at this time. 3.  Hypertension-blood pressure controlled. Change Lopressor to oral metoprolol succinate 25 mg twice daily given AF and LVEF of 50% with apical hypokinesis. 4.  Hyperlipidemia-continue high-dose statin. 5.  Continue to follow.

## 2019-12-27 ENCOUNTER — TELEPHONE (OUTPATIENT)
Dept: ADMINISTRATIVE | Age: 69
End: 2019-12-27

## 2019-12-27 VITALS
HEART RATE: 66 BPM | HEIGHT: 72 IN | OXYGEN SATURATION: 94 % | TEMPERATURE: 98.3 F | DIASTOLIC BLOOD PRESSURE: 66 MMHG | RESPIRATION RATE: 17 BRPM | SYSTOLIC BLOOD PRESSURE: 116 MMHG | WEIGHT: 265.7 LBS | BODY MASS INDEX: 35.99 KG/M2

## 2019-12-27 LAB
ANION GAP SERPL CALCULATED.3IONS-SCNC: 14 MMOL/L (ref 7–16)
BUN BLDV-MCNC: 18 MG/DL (ref 8–23)
CALCIUM SERPL-MCNC: 9 MG/DL (ref 8.6–10.2)
CHLORIDE BLD-SCNC: 95 MMOL/L (ref 98–107)
CO2: 25 MMOL/L (ref 22–29)
CREAT SERPL-MCNC: 0.9 MG/DL (ref 0.7–1.2)
GFR AFRICAN AMERICAN: >60
GFR NON-AFRICAN AMERICAN: >60 ML/MIN/1.73
GLUCOSE BLD-MCNC: 121 MG/DL (ref 74–99)
HCT VFR BLD CALC: 37.7 % (ref 37–54)
HEMOGLOBIN: 12.2 G/DL (ref 12.5–16.5)
MCH RBC QN AUTO: 33.2 PG (ref 26–35)
MCHC RBC AUTO-ENTMCNC: 32.4 % (ref 32–34.5)
MCV RBC AUTO: 102.7 FL (ref 80–99.9)
METER GLUCOSE: 119 MG/DL (ref 74–99)
PDW BLD-RTO: 12.2 FL (ref 11.5–15)
PLATELET # BLD: 250 E9/L (ref 130–450)
PMV BLD AUTO: 11.2 FL (ref 7–12)
POTASSIUM SERPL-SCNC: 3.7 MMOL/L (ref 3.5–5)
RBC # BLD: 3.67 E12/L (ref 3.8–5.8)
SODIUM BLD-SCNC: 134 MMOL/L (ref 132–146)
WBC # BLD: 10.8 E9/L (ref 4.5–11.5)

## 2019-12-27 PROCEDURE — 36592 COLLECT BLOOD FROM PICC: CPT

## 2019-12-27 PROCEDURE — 6370000000 HC RX 637 (ALT 250 FOR IP): Performed by: NURSE PRACTITIONER

## 2019-12-27 PROCEDURE — 6360000002 HC RX W HCPCS: Performed by: NURSE PRACTITIONER

## 2019-12-27 PROCEDURE — 36415 COLL VENOUS BLD VENIPUNCTURE: CPT

## 2019-12-27 PROCEDURE — 2700000000 HC OXYGEN THERAPY PER DAY

## 2019-12-27 PROCEDURE — 6370000000 HC RX 637 (ALT 250 FOR IP): Performed by: INTERNAL MEDICINE

## 2019-12-27 PROCEDURE — 6360000002 HC RX W HCPCS: Performed by: THORACIC SURGERY (CARDIOTHORACIC VASCULAR SURGERY)

## 2019-12-27 PROCEDURE — 2580000003 HC RX 258: Performed by: NURSE PRACTITIONER

## 2019-12-27 PROCEDURE — 93798 PHYS/QHP OP CAR RHAB W/ECG: CPT

## 2019-12-27 PROCEDURE — 85027 COMPLETE CBC AUTOMATED: CPT

## 2019-12-27 PROCEDURE — 94640 AIRWAY INHALATION TREATMENT: CPT

## 2019-12-27 PROCEDURE — 82962 GLUCOSE BLOOD TEST: CPT

## 2019-12-27 PROCEDURE — 80048 BASIC METABOLIC PNL TOTAL CA: CPT

## 2019-12-27 RX ORDER — METOPROLOL SUCCINATE 25 MG/1
25 TABLET, EXTENDED RELEASE ORAL 2 TIMES DAILY
Qty: 30 TABLET | Refills: 3 | Status: CANCELLED | OUTPATIENT
Start: 2019-12-27

## 2019-12-27 RX ORDER — FERROUS SULFATE 325(65) MG
325 TABLET ORAL 2 TIMES DAILY WITH MEALS
Qty: 60 TABLET | Refills: 0 | Status: SHIPPED | OUTPATIENT
Start: 2019-12-27 | End: 2020-02-04 | Stop reason: CLARIF

## 2019-12-27 RX ORDER — HYDROCODONE BITARTRATE AND ACETAMINOPHEN 5; 325 MG/1; MG/1
1 TABLET ORAL EVERY 4 HOURS PRN
Qty: 30 TABLET | Refills: 0 | Status: SHIPPED | OUTPATIENT
Start: 2019-12-27 | End: 2020-01-01

## 2019-12-27 RX ORDER — CLOPIDOGREL BISULFATE 75 MG/1
75 TABLET ORAL DAILY
Qty: 60 TABLET | Refills: 0 | Status: SHIPPED | OUTPATIENT
Start: 2019-12-28 | End: 2020-05-11

## 2019-12-27 RX ORDER — PANTOPRAZOLE SODIUM 40 MG/1
40 TABLET, DELAYED RELEASE ORAL DAILY
Qty: 30 TABLET | Refills: 3 | Status: SHIPPED | OUTPATIENT
Start: 2019-12-28 | End: 2020-02-04 | Stop reason: CLARIF

## 2019-12-27 RX ORDER — FUROSEMIDE 20 MG/1
20 TABLET ORAL DAILY
Qty: 5 TABLET | Refills: 0 | Status: SHIPPED | OUTPATIENT
Start: 2019-12-27 | End: 2020-02-04 | Stop reason: CLARIF

## 2019-12-27 RX ORDER — AMIODARONE HYDROCHLORIDE 200 MG/1
200 TABLET ORAL SEE ADMIN INSTRUCTIONS
Qty: 98 TABLET | Refills: 0 | Status: SHIPPED | OUTPATIENT
Start: 2019-12-27 | End: 2020-02-04 | Stop reason: CLARIF

## 2019-12-27 RX ORDER — METOPROLOL SUCCINATE 25 MG/1
25 TABLET, EXTENDED RELEASE ORAL 2 TIMES DAILY
Qty: 30 TABLET | Refills: 3 | Status: SHIPPED | OUTPATIENT
Start: 2019-12-27 | End: 2020-08-06 | Stop reason: SDUPTHER

## 2019-12-27 RX ORDER — ASCORBIC ACID 500 MG
500 TABLET ORAL 2 TIMES DAILY
Qty: 60 TABLET | Refills: 0 | Status: SHIPPED | OUTPATIENT
Start: 2019-12-27 | End: 2020-02-04 | Stop reason: CLARIF

## 2019-12-27 RX ORDER — FOLIC ACID 1 MG/1
1 TABLET ORAL DAILY
Qty: 30 TABLET | Refills: 0 | Status: SHIPPED | OUTPATIENT
Start: 2019-12-28 | End: 2020-02-04 | Stop reason: CLARIF

## 2019-12-27 RX ORDER — TAMSULOSIN HYDROCHLORIDE 0.4 MG/1
0.4 CAPSULE ORAL DAILY
Qty: 30 CAPSULE | Refills: 0 | Status: SHIPPED | OUTPATIENT
Start: 2019-12-28 | End: 2020-02-04 | Stop reason: CLARIF

## 2019-12-27 RX ADMIN — FOLIC ACID 1 MG: 1 TABLET ORAL at 09:25

## 2019-12-27 RX ADMIN — FERROUS SULFATE TAB 325 MG (65 MG ELEMENTAL FE) 325 MG: 325 (65 FE) TAB at 07:01

## 2019-12-27 RX ADMIN — MAGNESIUM GLUCONATE 500 MG ORAL TABLET 400 MG: 500 TABLET ORAL at 09:25

## 2019-12-27 RX ADMIN — Medication 500 MG: at 09:25

## 2019-12-27 RX ADMIN — ASPIRIN 81 MG: 81 TABLET, COATED ORAL at 09:25

## 2019-12-27 RX ADMIN — METOPROLOL SUCCINATE 25 MG: 25 TABLET, EXTENDED RELEASE ORAL at 09:25

## 2019-12-27 RX ADMIN — Medication 10 ML: at 09:26

## 2019-12-27 RX ADMIN — HYDROCODONE BITARTRATE AND ACETAMINOPHEN 1 TABLET: 5; 325 TABLET ORAL at 13:49

## 2019-12-27 RX ADMIN — KETOROLAC TROMETHAMINE 15 MG: 30 INJECTION, SOLUTION INTRAMUSCULAR; INTRAVENOUS at 00:27

## 2019-12-27 RX ADMIN — HEPARIN 300 UNITS: 100 SYRINGE at 09:24

## 2019-12-27 RX ADMIN — AMIODARONE HYDROCHLORIDE 400 MG: 200 TABLET ORAL at 09:24

## 2019-12-27 RX ADMIN — ROSUVASTATIN CALCIUM 10 MG: 20 TABLET, FILM COATED ORAL at 01:32

## 2019-12-27 RX ADMIN — PANTOPRAZOLE SODIUM 40 MG: 40 TABLET, DELAYED RELEASE ORAL at 09:25

## 2019-12-27 RX ADMIN — MUPIROCIN: 20 OINTMENT TOPICAL at 09:24

## 2019-12-27 RX ADMIN — CLOPIDOGREL 75 MG: 75 TABLET, FILM COATED ORAL at 09:25

## 2019-12-27 RX ADMIN — TAMSULOSIN HYDROCHLORIDE 0.4 MG: 0.4 CAPSULE ORAL at 09:25

## 2019-12-27 RX ADMIN — DOCUSATE SODIUM 100 MG: 100 CAPSULE, LIQUID FILLED ORAL at 09:25

## 2019-12-27 RX ADMIN — FUROSEMIDE 20 MG: 10 INJECTION, SOLUTION INTRAMUSCULAR; INTRAVENOUS at 09:25

## 2019-12-27 RX ADMIN — IPRATROPIUM BROMIDE AND ALBUTEROL SULFATE 1 AMPULE: 2.5; .5 SOLUTION RESPIRATORY (INHALATION) at 08:33

## 2019-12-27 RX ADMIN — ENOXAPARIN SODIUM 40 MG: 40 INJECTION SUBCUTANEOUS at 09:24

## 2019-12-27 RX ADMIN — POTASSIUM CHLORIDE 40 MEQ: 1500 TABLET, EXTENDED RELEASE ORAL at 07:01

## 2019-12-27 RX ADMIN — HYDROCODONE BITARTRATE AND ACETAMINOPHEN 2 TABLET: 5; 325 TABLET ORAL at 04:19

## 2019-12-27 ASSESSMENT — PAIN DESCRIPTION - PAIN TYPE: TYPE: SURGICAL PAIN

## 2019-12-27 ASSESSMENT — PAIN SCALES - GENERAL
PAINLEVEL_OUTOF10: 5
PAINLEVEL_OUTOF10: 7
PAINLEVEL_OUTOF10: 5
PAINLEVEL_OUTOF10: 0

## 2019-12-27 ASSESSMENT — PAIN DESCRIPTION - DESCRIPTORS: DESCRIPTORS: PRESSURE;DISCOMFORT;SORE

## 2019-12-27 ASSESSMENT — PAIN DESCRIPTION - LOCATION: LOCATION: CHEST;INCISION

## 2019-12-27 NOTE — PROGRESS NOTES
Patient assisted to bathroom and was bathed and hair washed. Patient able to shave and complete oral care without assistance. Telemetry wires and stickers changed. Chest tube incisions, left groin, and left leg incisions cleansed and epicardial wires dressing changed    Ambulated patient in hallway. Patient able to walk 400 feet on room air, taking no breaks and tolerated well:    SPO2 93% on room air at rest  SPO2 94% on room air ambulating  SPO2 97% on room air ambulating recovery    Patient compliant with SMI throughout shift, reaching 1250. Patient escorted back to chair, call light on patient's table in front of patient.

## 2019-12-27 NOTE — PROGRESS NOTES
POD#4   Awake, alert. No complaints. Vitals:    12/27/19 0328 12/27/19 0645 12/27/19 0815 12/27/19 0836   BP: 128/78  116/66    Pulse: 66  66    Resp: 20  17 17   Temp: 98.6 °F (37 °C)  98.3 °F (36.8 °C)    TempSrc: Temporal  Temporal    SpO2: 93% 94% 95% 95%   Weight:       Height:           Intake/Output Summary (Last 24 hours) at 12/27/2019 0923  Last data filed at 12/27/2019 0600  Gross per 24 hour   Intake 1140 ml   Output 1600 ml   Net -460 ml     Recent Labs     12/27/19  0445   HGB 12.2*   HCT 37.7   BUN 18   CREATININE 0.9        PE  Cardiac: RRR  Lungs: decreased bases  Chest incision clean, Sternum stable, epicardial pacing wires present and secure   Abd: Soft, +BS  Ext: Incisions intact, + mild edema    A/P: Stable s/p CABG x 3  POD#4   Acute blood loss anemia secondary to open heart surgery-- stable   Scr stable at 0.9, weight at baseline. On bid lasix, will continue daily at home x 5 days   PAF post op, received amio bolus and amio gtt. Currently on PO amio and BB and maintaining NSR x 24 hours. Post operative urinary retention. Ibarra removed this AM.  Will monitor voiding  . wires  ANDRY dressing removed. Incision c/d/i without evidence of infection  Currently on RA, cont as tolerated to  Keep sp02 >92%.   Cont duonebs while in hospital, IS and CPAP with sleep and naps   Ambulating 400 ft, DC home today after voiding    Increase activity as tolerated   Encourage incentive spirometry  This patient's case and care plan was discussed with the attending surgeon

## 2019-12-27 NOTE — PROGRESS NOTES
[facility] [address]    Name: Corrie Chowdhury    Age: 77 y.o. PCP: Jameel Reis MD    Date of Admission: 12/23/2019  5:08 AM    Date of Service: 12/27/2019    Chief Complaint: Follow-up for CAD  POD #4 CABG L>LAD, V>Ramus, V>OM. EF 50%  Interim History:  No new overnight cardiac complaints. Currently with no complaints of CP, SOB, palpitations, dizziness, or lightheadedness. Ambulated 400ft and says ready to go. Telemetry personally reviewed and showed sinus rhythm      Review of Systems:   Cardiac: As per HPI  General: No fever, chills  Pulmonary: No cough, wheeze, or shortness of breath  GI: No nausea, vomiting,or abdominal pain  Neuro: No headache or confusion    Problem List:  Active Problems:    CAD in native artery  Resolved Problems:    * No resolved hospital problems. *      Past Medical History:  Past Medical History:   Diagnosis Date    Chronic back pain     Erectile dysfunction     Esophagitis 09/2011    VITO    H/O cardiomegaly     NORMAL ECHO IN 2016    H/O hypogonadism 2012    History of basal cell carcinoma     History of pneumonia 2016    History of sebaceous cyst     Hyperlipidemia     Hypertension     Obesity     Osteoarthritis     Sleep apnea        Allergies:   Allergies   Allergen Reactions    Statins Other (See Comments)     Myalgias        Current Medications:  Current Facility-Administered Medications   Medication Dose Route Frequency Provider Last Rate Last Dose    heparin flush (100 units/mL) injection 300 Units  300 Units Intracatheter Q12H Ghislaine Raza MD   300 Units at 12/27/19 7329    metoprolol succinate (TOPROL XL) extended release tablet 25 mg  25 mg Oral BID Chaz Nicole MD   25 mg at 12/27/19 1483    bisacodyl (DULCOLAX) suppository 10 mg  10 mg Rectal Daily DESTINEE Hoskins CNP        enoxaparin (LOVENOX) injection 40 mg  40 mg Subcutaneous Daily DESTINEE Hoskins CNP   40 mg at 12/27/19 4017    (CRESTOR) tablet 10 mg  10 mg Oral Nightly Jordi Pila, APRN - CNP   10 mg at 12/27/19 0132    sodium chloride flush 0.9 % injection 10 mL  10 mL Intravenous 2 times per day Jordi Pila, APRN - CNP   10 mL at 12/27/19 9972    sodium chloride flush 0.9 % injection 10 mL  10 mL Intravenous PRN Jordi Pila, APRN - CNP   10 mL at 12/26/19 0533    magnesium oxide (MAG-OX) tablet 400 mg  400 mg Oral Daily Jordi Pila, APRN - CNP   400 mg at 12/27/19 7564    mupirocin (BACTROBAN) 2 % ointment   Nasal BID Jordi Pila, APRN - CNP        ipratropium-albuterol (DUONEB) nebulizer solution 1 ampule  1 ampule Inhalation Q4H WA Jordi Pila, APRN - CNP   1 ampule at 12/27/19 6732    glucose (GLUTOSE) 40 % oral gel 15 g  15 g Oral PRN Jordi Pila, APRN - CNP        dextrose 50 % IV solution  12.5 g Intravenous PRN Jordi Pila, APRN - CNP        glucagon (rDNA) injection 1 mg  1 mg Intramuscular PRN Jordi Pila, APRN - CNP        dextrose 5 % solution  100 mL/hr Intravenous PRN Jordi Pila, APRN - CNP        clopidogrel (PLAVIX) tablet 75 mg  75 mg Oral Daily Jordi Pila, APRN - CNP   75 mg at 12/27/19 0925    tamsulosin (FLOMAX) capsule 0.4 mg  0.4 mg Oral Daily Jordi Pila, APRN - CNP   0.4 mg at 12/27/19 6236    senna (SENOKOT) tablet 8.6 mg  1 tablet Oral Nightly Jordi Pila, APRN - CNP   8.6 mg at 12/24/19 2211    ketorolac (TORADOL) injection 15 mg  15 mg Intravenous Q6H PRN Jordi Pila, APRN - CNP   15 mg at 12/27/19 0027      dextrose         Physical Exam:  /66   Pulse 66   Temp 98.3 °F (36.8 °C) (Temporal)   Resp 17   Ht 6' (1.829 m)   Wt 265 lb 11.2 oz (120.5 kg)   SpO2 94%   BMI 36.04 kg/m²   Weight change: -1 lb (-0.454 kg)  Wt Readings from Last 3 Encounters:   12/27/19 265 lb 11.2 oz (120.5 kg)   12/19/19 265 lb (120.2 kg)   12/17/19 260 lb (117.9 kg)     General: Awake, alert, oriented x3, no acute distress  Neck: No JVD, carotid bruits, thyromegaly, or lymphadenopathy  Cardiac: Regular rate and rhythm, normal S1 and split S2, no murmurs, no S3 or S4, no pericardial rubs. Carotid upstrokes brisk  Resp: clear bilaterally without wheezes, rhonchi, or rales; unlabored respirations  Abdomen: soft, nontender, nondistended, BS+; no masses, bruits, or hepatomegaly  Extremities: no cyanosis, clubbing, or edema. Distal pulses intact  Skin: Warm and dry, no rashes or lesions  Neuro: moves all extremities to command, no focal deficits noted    Intake/Output:    Intake/Output Summary (Last 24 hours) at 12/27/2019 1109  Last data filed at 12/27/2019 0600  Gross per 24 hour   Intake 1140 ml   Output 1600 ml   Net -460 ml     No intake/output data recorded. Laboratory Tests:  Last 3 CBC:  Recent Labs     12/25/19 0758 12/26/19  0530 12/27/19  0445   WBC 12.4* 12.0* 10.8   RBC 3.93 3.68* 3.67*   HGB 13.0 11.9* 12.2*   HCT 41.2 37.8 37.7   .8* 102.7* 102.7*   MCH 33.1 32.3 33.2   MCHC 31.6* 31.5* 32.4   RDW 12.4 12.1 12.2    193 250   MPV 11.1 11.2 11.2       Last 3 CMP:    Recent Labs     12/25/19 0758 12/26/19  0530 12/27/19  0445    137 134   K 3.7 4.0 3.7   CL 98 98 95*   CO2 23 25 25   BUN 23 18 18   CREATININE 0.9 0.8 0.9   GLUCOSE 134* 121* 121*   CALCIUM 9.2 9.2 9.0       Last 3 Mag/Phos:  Recent Labs     12/25/19  0758   MG 2.2       Last 3 CK, CKMB, Troponin  No results for input(s): CKTOTAL, CKMB, TROPONINI in the last 72 hours. Last 3 BNP:  No results for input(s): BNP in the last 72 hours. No results found for: BNP    Last 3 Glucose:     Recent Labs     12/25/19 0758 12/26/19  0530 12/27/19  0445   GLUCOSE 134* 121* 121*       Last 3 Coags:  No results for input(s): PROTIME, INR, PTT in the last 72 hours. Lab Results   Component Value Date    PROTIME 13.6 12/23/2019    INR 1.2 12/23/2019       Last 3 Lipid Panel:  No results for input(s): LDLCALC, HDL, TRIG in the last 72 hours.     Invalid input(s): CHLPL  Lab

## 2019-12-27 NOTE — PLAN OF CARE
Problem: Falls - Risk of:  Goal: Will remain free from falls  Outcome: Met This Shift  Goal: Absence of physical injury  Outcome: Met This Shift     Problem: Cardiac Output - Decreased:  Goal: Cardiac output within specified parameters  Outcome: Met This Shift     Problem: Gas Exchange - Impaired:  Goal: Levels of oxygenation will improve  Outcome: Met This Shift

## 2020-01-02 ENCOUNTER — OFFICE VISIT (OUTPATIENT)
Dept: PRIMARY CARE CLINIC | Age: 70
End: 2020-01-02
Payer: MEDICARE

## 2020-01-02 VITALS
HEART RATE: 67 BPM | SYSTOLIC BLOOD PRESSURE: 118 MMHG | DIASTOLIC BLOOD PRESSURE: 74 MMHG | OXYGEN SATURATION: 94 % | BODY MASS INDEX: 34.67 KG/M2 | TEMPERATURE: 97.4 F | WEIGHT: 256 LBS | HEIGHT: 72 IN

## 2020-01-02 PROBLEM — J90 PLEURAL EFFUSION: Status: ACTIVE | Noted: 2020-01-02

## 2020-01-02 PROBLEM — I48.91 POSTOPERATIVE ATRIAL FIBRILLATION (HCC): Status: ACTIVE | Noted: 2020-01-02

## 2020-01-02 PROBLEM — I97.89 POSTOPERATIVE ATRIAL FIBRILLATION (HCC): Status: ACTIVE | Noted: 2020-01-02

## 2020-01-02 PROBLEM — M19.90 OSTEOARTHRITIS: Status: ACTIVE | Noted: 2020-01-02

## 2020-01-02 PROCEDURE — 1111F DSCHRG MED/CURRENT MED MERGE: CPT | Performed by: INTERNAL MEDICINE

## 2020-01-02 PROCEDURE — 99214 OFFICE O/P EST MOD 30 MIN: CPT | Performed by: INTERNAL MEDICINE

## 2020-01-02 ASSESSMENT — PATIENT HEALTH QUESTIONNAIRE - PHQ9
SUM OF ALL RESPONSES TO PHQ9 QUESTIONS 1 & 2: 0
1. LITTLE INTEREST OR PLEASURE IN DOING THINGS: 0
2. FEELING DOWN, DEPRESSED OR HOPELESS: 0
SUM OF ALL RESPONSES TO PHQ QUESTIONS 1-9: 0
SUM OF ALL RESPONSES TO PHQ QUESTIONS 1-9: 0

## 2020-01-02 NOTE — PROGRESS NOTES
tablet by mouth See Admin Instructions Take two tablets three times daily x 1 week, then take two tablets two times daily x 1 week, then take two tablets one time daily x 1 week, then take one tablet one time daily x 2 weeks, then discontinue medication, Disp: 98 tablet, Rfl: 0    ferrous sulfate 325 (65 Fe) MG tablet, Take 1 tablet by mouth 2 times daily (with meals), Disp: 60 tablet, Rfl: 0    folic acid (FOLVITE) 1 MG tablet, Take 1 tablet by mouth daily, Disp: 30 tablet, Rfl: 0    magnesium oxide (MAG-OX) 400 (241.3 Mg) MG TABS tablet, Take 1 tablet by mouth daily for 14 days, Disp: 14 tablet, Rfl: 0    tamsulosin (FLOMAX) 0.4 MG capsule, Take 1 capsule by mouth daily, Disp: 30 capsule, Rfl: 0    clopidogrel (PLAVIX) 75 MG tablet, Take 1 tablet by mouth daily, Disp: 60 tablet, Rfl: 0    pantoprazole (PROTONIX) 40 MG tablet, Take 1 tablet by mouth daily for 14 days, Disp: 30 tablet, Rfl: 3    vitamin C (VITAMIN C) 500 MG tablet, Take 1 tablet by mouth 2 times daily, Disp: 60 tablet, Rfl: 0    metoprolol succinate (TOPROL XL) 25 MG extended release tablet, Take 1 tablet by mouth 2 times daily, Disp: 30 tablet, Rfl: 3    rosuvastatin (CRESTOR) 10 MG tablet, Take 1 tablet by mouth nightly, Disp: 30 tablet, Rfl: 3    aspirin 81 MG chewable tablet, Take 1 tablet by mouth daily, Disp: 30 tablet, Rfl: 3    furosemide (LASIX) 20 MG tablet, Take 1 tablet by mouth daily for 5 days, Disp: 5 tablet, Rfl: 0  Allergies   Allergen Reactions    Statins Other (See Comments)     Myalgias        Past Medical History:   Diagnosis Date    Basal cell carcinoma     CAD (coronary artery disease) 11/2019    Cardiomegaly 05/17/2016    Chronic back pain     Chronic back pain     CPAP (continuous positive airway pressure) dependence     Erectile dysfunction     Erectile dysfunction     Esophagitis 09/2011    VITO    H/O cardiomegaly     NORMAL ECHO IN 2016    H/O hypogonadism 2012    History of basal cell carcinoma Physical activity:     Days per week: Not on file     Minutes per session: Not on file    Stress: Not on file   Relationships    Social connections:     Talks on phone: Not on file     Gets together: Not on file     Attends Mosque service: Not on file     Active member of club or organization: Not on file     Attends meetings of clubs or organizations: Not on file     Relationship status: Not on file    Intimate partner violence:     Fear of current or ex partner: Not on file     Emotionally abused: Not on file     Physically abused: Not on file     Forced sexual activity: Not on file   Other Topics Concern    Not on file   Social History Narrative    Not on file       Vitals:    01/02/20 1530   BP: 118/74   Pulse: 67   Temp: 97.4 °F (36.3 °C)   TempSrc: Temporal   SpO2: 94%   Weight: 256 lb (116.1 kg)   Height: 6' (1.829 m)       Physical Exam  Exam:  Const: Appears healthy,well developed and well nourished. Appears obese. Eyes: EOMI in both eyes. PERRL. ENMT: External canals are clear and dry. Tympanic membranes: thickening. External nose WNL. Moistness and normal color of the nasal mucosae. Septum is in the midline. Full upper and full lower  dentures. Gums appear healthy. Posterior pharynx shows no exudate, irritation or redness. Neck: Supple and symmetric. Palpation reveals no adenopathy. No masses appreciated. Thyroid  exhibits no nodules or thyromegaly. No JVD. Resp: Respirations are unlabored. Respiration rate is normal.  Absent breath sounds left base greater than right base. Dullness to percussion at the left base. CV: Rhythm is regular. S1 is normal. S2 is normal. No heart murmur appreciated. Carotids: no  bruits. Abdominal aorta is not palpable. Pedal pulses: 2+ and equal bilaterally No abdominal bruits. Extremities: No clubbing, cyanosis or edema is 1-2+ below the mid tibia bilaterally. Abdomen: Bowel sounds are normoactive.  Palpation of the abdomen reveals softness, but no  distension,

## 2020-01-03 LAB
BLOOD BANK DISPENSE STATUS: NORMAL
BLOOD BANK PRODUCT CODE: NORMAL
BPU ID: NORMAL
DESCRIPTION BLOOD BANK: NORMAL

## 2020-01-09 ENCOUNTER — HOSPITAL ENCOUNTER (OUTPATIENT)
Dept: CARDIAC REHAB | Age: 70
Setting detail: THERAPIES SERIES
Discharge: HOME | End: 2020-01-09

## 2020-01-15 ENCOUNTER — HOSPITAL ENCOUNTER (OUTPATIENT)
Dept: CARDIAC REHAB | Age: 70
Setting detail: THERAPIES SERIES
Discharge: HOME | End: 2020-01-15

## 2020-01-17 ENCOUNTER — HOSPITAL ENCOUNTER (OUTPATIENT)
Dept: CARDIAC REHAB | Age: 70
Setting detail: THERAPIES SERIES
Discharge: HOME | End: 2020-01-17

## 2020-01-20 ENCOUNTER — HOSPITAL ENCOUNTER (OUTPATIENT)
Dept: CARDIAC REHAB | Age: 70
Setting detail: THERAPIES SERIES
Discharge: HOME | End: 2020-01-20

## 2020-01-21 ENCOUNTER — OFFICE VISIT (OUTPATIENT)
Dept: CARDIOTHORACIC SURGERY | Age: 70
End: 2020-01-21

## 2020-01-21 VITALS
HEART RATE: 54 BPM | SYSTOLIC BLOOD PRESSURE: 135 MMHG | WEIGHT: 255 LBS | HEIGHT: 72 IN | DIASTOLIC BLOOD PRESSURE: 89 MMHG | BODY MASS INDEX: 34.54 KG/M2

## 2020-01-21 PROCEDURE — 99024 POSTOP FOLLOW-UP VISIT: CPT | Performed by: THORACIC SURGERY (CARDIOTHORACIC VASCULAR SURGERY)

## 2020-01-21 ASSESSMENT — ENCOUNTER SYMPTOMS
WHEEZING: 0
COUGH: 0

## 2020-01-22 ENCOUNTER — HOSPITAL ENCOUNTER (OUTPATIENT)
Dept: CARDIAC REHAB | Age: 70
Setting detail: THERAPIES SERIES
Discharge: HOME | End: 2020-01-22

## 2020-01-22 NOTE — DISCHARGE SUMMARY
Physician Discharge Summary     Patient ID:  Judy Manjarrez  [id number]  89 y.o.  1950    Admit date: 12/23/2019    Discharge date and time: 12/27/2019  2:08 PM     Admitting Physician: Guanako Marquez MD     Discharge Physician: Jeni Chester MD     Admission Diagnoses: CAD in native artery [I25.10]    Discharge Diagnoses:  Unstable angina, severe multivessel coronary  artery disease, morbid obesity with a BMI of 36, chronic back pain,  esophagitis, basal cell carcinoma, pneumonia, hypertension,  hyperlipidemia, osteoarthritis, and sleep apnea    OPERATIONS:  1. Sternotomy. 2.  Coronary artery bypass grafting x3, left internal mammary artery to  the LAD, saphenous vein graft to ramus intermedius and saphenous vein  graft to the obtuse marginal.  3.  Endoscopic harvesting, left lower extremity, greater saphenous vein. 4.  Rigid internal fixation of the sternum using SternaLock plates x2      Discharged Condition: stable    Indication for Admission: This is a 59-year-old man who is having increasing angina. He  underwent a stress test, which  was abnormal and had a cardiac cath,  which showed severe multivessel coronary artery disease including  of  the LAD. He was referred for coronary artery bypass grafting. The  patient was described the procedure in full including the risks and  complications including but not limited to bleeding, infection, need for  operation, hemothorax, pneumothorax, stroke, myocardial infarction, and  death and the patient agreed to proceed. Hospital Course: course as above and on 12/23/19 patient underwent  Sternotomy/CABG x 3 (LIMA-LAD, SVG-RI, SVG-OM)/EVH LLE/Rigid internal fixation of the sternum with SternaLock plates x 2. On POD 1 patient was not on any supporting gtts, BB was started, mediastinal CTs were removed and patient was transferred to step down floor. On POD 2 patient was in PAF , BB was up-titrated, PICC was inserted, amio was bolused and amio gtt was started.

## 2020-01-24 ENCOUNTER — HOSPITAL ENCOUNTER (OUTPATIENT)
Dept: CARDIAC REHAB | Age: 70
Setting detail: THERAPIES SERIES
Discharge: HOME | End: 2020-01-24

## 2020-02-04 ENCOUNTER — HOSPITAL ENCOUNTER (OUTPATIENT)
Age: 70
Discharge: HOME | End: 2020-02-06
Payer: MEDICARE

## 2020-02-04 ENCOUNTER — OFFICE VISIT (OUTPATIENT)
Dept: FAMILY MEDICINE CLINIC | Age: 70
End: 2020-02-04
Payer: MEDICARE

## 2020-02-04 VITALS
SYSTOLIC BLOOD PRESSURE: 132 MMHG | OXYGEN SATURATION: 98 % | HEART RATE: 55 BPM | DIASTOLIC BLOOD PRESSURE: 80 MMHG | WEIGHT: 263 LBS | BODY MASS INDEX: 35.67 KG/M2

## 2020-02-04 PROBLEM — D50.0 IRON DEFICIENCY ANEMIA DUE TO CHRONIC BLOOD LOSS: Status: ACTIVE | Noted: 2020-02-04

## 2020-02-04 LAB
ALBUMIN SERPL-MCNC: 4.2 G/DL (ref 3.5–5.2)
ALP BLD-CCNC: 77 U/L (ref 40–129)
ALT SERPL-CCNC: 22 U/L (ref 0–40)
ANION GAP SERPL CALCULATED.3IONS-SCNC: 12 MMOL/L (ref 7–16)
AST SERPL-CCNC: 26 U/L (ref 0–39)
BASOPHILS ABSOLUTE: 0.11 E9/L (ref 0–0.2)
BASOPHILS RELATIVE PERCENT: 1.2 % (ref 0–2)
BILIRUB SERPL-MCNC: 0.5 MG/DL (ref 0–1.2)
BUN BLDV-MCNC: 18 MG/DL (ref 8–23)
CALCIUM SERPL-MCNC: 9.7 MG/DL (ref 8.6–10.2)
CHLORIDE BLD-SCNC: 100 MMOL/L (ref 98–107)
CO2: 27 MMOL/L (ref 22–29)
CREAT SERPL-MCNC: 1 MG/DL (ref 0.7–1.2)
EOSINOPHILS ABSOLUTE: 0.69 E9/L (ref 0.05–0.5)
EOSINOPHILS RELATIVE PERCENT: 7.7 % (ref 0–6)
GFR AFRICAN AMERICAN: >60
GFR NON-AFRICAN AMERICAN: >60 ML/MIN/1.73
GLUCOSE BLD-MCNC: 90 MG/DL (ref 74–99)
HBA1C MFR BLD: 5.3 %
HCT VFR BLD CALC: 50.3 % (ref 37–54)
HEMOGLOBIN: 15.9 G/DL (ref 12.5–16.5)
IMMATURE GRANULOCYTES #: 0.03 E9/L
IMMATURE GRANULOCYTES %: 0.3 % (ref 0–5)
LYMPHOCYTES ABSOLUTE: 1.76 E9/L (ref 1.5–4)
LYMPHOCYTES RELATIVE PERCENT: 19.7 % (ref 20–42)
MCH RBC QN AUTO: 33.3 PG (ref 26–35)
MCHC RBC AUTO-ENTMCNC: 31.6 % (ref 32–34.5)
MCV RBC AUTO: 105.2 FL (ref 80–99.9)
MONOCYTES ABSOLUTE: 1.13 E9/L (ref 0.1–0.95)
MONOCYTES RELATIVE PERCENT: 12.6 % (ref 2–12)
NEUTROPHILS ABSOLUTE: 5.22 E9/L (ref 1.8–7.3)
NEUTROPHILS RELATIVE PERCENT: 58.5 % (ref 43–80)
PDW BLD-RTO: 12.7 FL (ref 11.5–15)
PLATELET # BLD: 334 E9/L (ref 130–450)
PMV BLD AUTO: 11 FL (ref 7–12)
POTASSIUM SERPL-SCNC: 4.3 MMOL/L (ref 3.5–5)
RBC # BLD: 4.78 E12/L (ref 3.8–5.8)
SODIUM BLD-SCNC: 139 MMOL/L (ref 132–146)
TOTAL PROTEIN: 8 G/DL (ref 6.4–8.3)
WBC # BLD: 8.9 E9/L (ref 4.5–11.5)

## 2020-02-04 PROCEDURE — 99214 OFFICE O/P EST MOD 30 MIN: CPT | Performed by: INTERNAL MEDICINE

## 2020-02-04 PROCEDURE — 83036 HEMOGLOBIN GLYCOSYLATED A1C: CPT | Performed by: INTERNAL MEDICINE

## 2020-02-04 PROCEDURE — 36415 COLL VENOUS BLD VENIPUNCTURE: CPT

## 2020-02-04 PROCEDURE — 85025 COMPLETE CBC W/AUTO DIFF WBC: CPT

## 2020-02-04 PROCEDURE — 80053 COMPREHEN METABOLIC PANEL: CPT

## 2020-02-04 NOTE — PROGRESS NOTES
TO [facility] 18  17. OBESITY- GLP1 DISCUSSED 10/31/18  18. CHF-- referred to [facility]. Bilateral Carpal Tunnel- wrist splint   20. Abnormal stres- cath planned 12/10/2019  21. CVA-   22. CAD- CABG 2019  23. POST-OP A FIB-resolved  24. LIPOMA left leg 2020  Surgical Hx:  Knee Replacement - () BILATERAL-  1. He had right shoulder surgery in .  2. He had a vasectomy at age 33.  1. He had bilateral knee replacements in . 4. Sebaceous cyst removal by Dr. Ermelinda Kincaid in . 5. Basal cell carcinoma by Dr. Sharon Warren in . 6. CABG- BHAKTI- 2019  7. LUMBAR surgery- Rosa Reinoso  Reviewed and updated. FH:  Father:  Cerebrovascular Accident (CVA) - age 80. Mother:  Leukemia -  in her 29's. Reviewed, no changes. SH:  Marital: . Personal Habits: Cigarette Use: Former Cigarette Smoker 1 Pack Daily - for 30 years Negative For  current cigarette smoker. Alcohol: Occasionally consumes alcohol. Daily Caffeine: Consumes on  average 4 cups of hot tea per day - more recently 2 cups. Exercise Type: Walks sporadically. Reviewed, no changes. Date: 12/3/2019  Was the patient queried about smoking behavior? Yes   Does the patient currently smoke? Smoking: Patient is a former smoker.     Current Outpatient Medications:     clopidogrel (PLAVIX) 75 MG tablet, Take 1 tablet by mouth daily, Disp: 60 tablet, Rfl: 0    metoprolol succinate (TOPROL XL) 25 MG extended release tablet, Take 1 tablet by mouth 2 times daily, Disp: 30 tablet, Rfl: 3    rosuvastatin (CRESTOR) 10 MG tablet, Take 1 tablet by mouth nightly, Disp: 30 tablet, Rfl: 3    aspirin 81 MG chewable tablet, Take 1 tablet by mouth daily, Disp: 30 tablet, Rfl: 3  Allergies   Allergen Reactions    Statins Other (See Comments)     Myalgias        Past Medical History:   Diagnosis Date    Basal cell carcinoma     CAD (coronary artery disease) 2019    Cardiomegaly 2016    Chronic back pain     Chronic back pain     Smokeless tobacco: Never Used   Substance and Sexual Activity    Alcohol use: Yes     Alcohol/week: 10.0 standard drinks     Types: 10 Shots of liquor per week     Comment: rum    Drug use: No    Sexual activity: Not on file   Lifestyle    Physical activity:     Days per week: Not on file     Minutes per session: Not on file    Stress: Not on file   Relationships    Social connections:     Talks on phone: Not on file     Gets together: Not on file     Attends Jewish service: Not on file     Active member of club or organization: Not on file     Attends meetings of clubs or organizations: Not on file     Relationship status: Not on file    Intimate partner violence:     Fear of current or ex partner: Not on file     Emotionally abused: Not on file     Physically abused: Not on file     Forced sexual activity: Not on file   Other Topics Concern    Not on file   Social History Narrative    Not on file       Vitals:    02/04/20 1532   BP: 132/80   Pulse: 55   SpO2: 98%   Weight: 263 lb (119.3 kg)       Physical Exam  Exam:  Const: Appears healthy,well developed and well nourished. Appears obese. Eyes: EOMI in both eyes. PERRL. ENMT: External canals are clear and dry. Tympanic membranes: thickening. External nose WNL. Moistness and normal color of the nasal mucosae. Septum is in the midline. Full upper and full lower  dentures. Gums appear healthy. Posterior pharynx shows no exudate, irritation or redness. Neck: Supple and symmetric. Palpation reveals no adenopathy. No masses appreciated. Thyroid  exhibits no nodules or thyromegaly. No JVD. Resp: Respirations are unlabored. Respiration rate is normal.  Absent breath sounds left base greater than right base. Dullness to percussion at the left base. CV: Rhythm is regular. S1 is normal. S2 is normal. No heart murmur appreciated. Carotids: no  bruits. Abdominal aorta is not palpable. Pedal pulses: 2+ and equal bilaterally No abdominal bruits.   Extremities:

## 2020-02-12 ENCOUNTER — HOSPITAL ENCOUNTER (OUTPATIENT)
Dept: CARDIAC REHAB | Age: 70
Discharge: HOME | End: 2020-02-12

## 2020-02-12 VITALS
BODY MASS INDEX: 35.49 KG/M2 | RESPIRATION RATE: 20 BRPM | HEART RATE: 70 BPM | WEIGHT: 262 LBS | DIASTOLIC BLOOD PRESSURE: 97 MMHG | SYSTOLIC BLOOD PRESSURE: 169 MMHG | HEIGHT: 72 IN

## 2020-02-12 ASSESSMENT — PATIENT HEALTH QUESTIONNAIRE - PHQ9: SUM OF ALL RESPONSES TO PHQ QUESTIONS 1-9: 0

## 2020-02-18 ENCOUNTER — TELEPHONE (OUTPATIENT)
Dept: NUTRITION | Age: 70
End: 2020-02-18

## 2020-02-18 NOTE — TELEPHONE ENCOUNTER
2/18/2020 1401 Nutrition: Left voice message on this date regarding scheduling appointment. Will remain available.  Electronically signed by Preeti Segura RD, LIVIA on 2/18/20 at 2:01 PM

## 2020-03-02 ENCOUNTER — TELEPHONE (OUTPATIENT)
Dept: FAMILY MEDICINE CLINIC | Age: 70
End: 2020-03-02

## 2020-03-11 ENCOUNTER — OFFICE VISIT (OUTPATIENT)
Dept: FAMILY MEDICINE CLINIC | Age: 70
End: 2020-03-11
Payer: MEDICARE

## 2020-03-11 ENCOUNTER — HOSPITAL ENCOUNTER (OUTPATIENT)
Age: 70
Discharge: HOME | End: 2020-03-13
Payer: MEDICARE

## 2020-03-11 VITALS
DIASTOLIC BLOOD PRESSURE: 70 MMHG | BODY MASS INDEX: 36.08 KG/M2 | OXYGEN SATURATION: 99 % | WEIGHT: 266 LBS | SYSTOLIC BLOOD PRESSURE: 126 MMHG | HEART RATE: 68 BPM

## 2020-03-11 PROBLEM — R53.82 CHRONIC FATIGUE: Status: ACTIVE | Noted: 2020-03-11

## 2020-03-11 LAB
BASOPHILS ABSOLUTE: 0.1 E9/L (ref 0–0.2)
BASOPHILS RELATIVE PERCENT: 1.1 % (ref 0–2)
EOSINOPHILS ABSOLUTE: 0.71 E9/L (ref 0.05–0.5)
EOSINOPHILS RELATIVE PERCENT: 7.5 % (ref 0–6)
HCT VFR BLD CALC: 52.7 % (ref 37–54)
HEMOGLOBIN: 16.9 G/DL (ref 12.5–16.5)
IMMATURE GRANULOCYTES #: 0.05 E9/L
IMMATURE GRANULOCYTES %: 0.5 % (ref 0–5)
LYMPHOCYTES ABSOLUTE: 1.94 E9/L (ref 1.5–4)
LYMPHOCYTES RELATIVE PERCENT: 20.5 % (ref 20–42)
MCH RBC QN AUTO: 32.6 PG (ref 26–35)
MCHC RBC AUTO-ENTMCNC: 32.1 % (ref 32–34.5)
MCV RBC AUTO: 101.7 FL (ref 80–99.9)
MONOCYTES ABSOLUTE: 1.23 E9/L (ref 0.1–0.95)
MONOCYTES RELATIVE PERCENT: 13 % (ref 2–12)
NEUTROPHILS ABSOLUTE: 5.45 E9/L (ref 1.8–7.3)
NEUTROPHILS RELATIVE PERCENT: 57.4 % (ref 43–80)
PDW BLD-RTO: 12.2 FL (ref 11.5–15)
PLATELET # BLD: 330 E9/L (ref 130–450)
PMV BLD AUTO: 11.5 FL (ref 7–12)
PROSTATE SPECIFIC ANTIGEN: 4.96 NG/ML (ref 0–4)
RBC # BLD: 5.18 E12/L (ref 3.8–5.8)
TESTOSTERONE TOTAL: 317.8 NG/DL
TSH SERPL DL<=0.05 MIU/L-ACNC: 3.44 UIU/ML (ref 0.27–4.2)
WBC # BLD: 9.5 E9/L (ref 4.5–11.5)

## 2020-03-11 PROCEDURE — 99214 OFFICE O/P EST MOD 30 MIN: CPT | Performed by: INTERNAL MEDICINE

## 2020-03-11 PROCEDURE — G0103 PSA SCREENING: HCPCS

## 2020-03-11 PROCEDURE — 36415 COLL VENOUS BLD VENIPUNCTURE: CPT

## 2020-03-11 PROCEDURE — 85025 COMPLETE CBC W/AUTO DIFF WBC: CPT

## 2020-03-11 PROCEDURE — 84443 ASSAY THYROID STIM HORMONE: CPT

## 2020-03-11 PROCEDURE — 84403 ASSAY OF TOTAL TESTOSTERONE: CPT

## 2020-03-11 NOTE — PROGRESS NOTES
clear and dry. Tympanic membranes: thickening. External nose WNL. Moistness and normal color of the nasal mucosae. Septum is in the midline. Full upper and full lower  dentures. Gums appear healthy. Posterior pharynx shows no exudate, irritation or redness. Neck: Supple and symmetric. Palpation reveals no adenopathy. No masses appreciated. Thyroid  exhibits no nodules or thyromegaly. No JVD. Resp: Respirations are unlabored. Respiration rate is normal.  Absent breath sounds left base greater than right base. Dullness to percussion at the left base. CV: Rhythm is regular. S1 is normal. S2 is normal. No heart murmur appreciated. Carotids: no  bruits. Abdominal aorta is not palpable. Pedal pulses: 2+ and equal bilaterally No abdominal bruits. Extremities: No clubbing, cyanosis or edema is 1-2+ below the mid tibia bilaterally. Abdomen: Bowel sounds are normoactive. Palpation of the abdomen reveals softness, but no  distension, organomegaly or tenderness. No abdominal masses. No palpable hepatosplenomegaly. Musculo: Walks with a limping gait. Upper Extremities: Full ROM bilaterally. Lower Extremities:  Limitation of the lower extremities. Skin: 3 and half centimeter lipoma medial thigh left leg. Freely movable. Neuro: Alert and oriented x3. Mood is normal. Affect is normal. Speech is articulate and fluent. Reflexes: DTR's are intact, symmetric and 2+ bilaterally. Psych: Patient's attitude is cooperative. Patient's affect is appropriate. Judgement is realistic. Insight  is appropriate. Efrain Randhawa was seen today for fatigue and discuss medications. Diagnoses and all orders for this visit:    Essential hypertension  -     TSH without Reflex; Future  -     PSA SCREENING; Future  -     CBC Auto Differential; Future  -     TESTOSTERONE; Future    Chronic fatigue  -     TSH without Reflex; Future  -     PSA SCREENING;  Future  -     CBC Auto Differential; Future  -     TESTOSTERONE; Future    Lumbar radiculopathy    Class 1 obesity due to excess calories with serious comorbidity and body mass index (BMI) of 32.0 to 32.9 in adult    Prostate cancer screening  -     PSA SCREENING; Future    Further evaluation of fatigue will be done and the patient will have rosuvastatin decreased to 10 mg on Monday Wednesday and Friday. He is to stay on his low-dose metoprolol tartrate he is still going through cardiac rehab. He is not having anginal symptoms. I will see the patient back in about 6 weeks. We will remeasure lipids at that point in time. Check his performance status. If his testosterone is low or his PSA is elevated he will need referral back to urology. I did discuss this with him today.

## 2020-03-24 ENCOUNTER — TELEPHONE (OUTPATIENT)
Dept: FAMILY MEDICINE CLINIC | Age: 70
End: 2020-03-24

## 2020-03-24 NOTE — TELEPHONE ENCOUNTER
Patient calling in stated he is having trouble getting his cpap machine stated he was in form by [facility] that it was denied  and the original DrTrell He saw for this he no longer goes to and hasn't been there in about 8 years, he not sure what to do if a new sleep study needs done or what would be the next step, please advise.

## 2020-03-24 NOTE — TELEPHONE ENCOUNTER
Tell the patient to contact  at [facility] directly. He can take care of this problem.

## 2020-03-24 NOTE — TELEPHONE ENCOUNTER
It was with Dr. Balbuena Doctor back in 2013, stated he thinks he is at [facility] now, I see he had it done at [facility] for sleep on 10/30/2013 it is in care everywhere dated 10/29/2013 sleep results.

## 2020-03-30 ENCOUNTER — HOSPITAL ENCOUNTER (OUTPATIENT)
Dept: CARDIAC REHAB | Age: 70
Discharge: HOME | End: 2020-03-30

## 2020-03-31 ENCOUNTER — TELEPHONE (OUTPATIENT)
Dept: FAMILY MEDICINE CLINIC | Age: 70
End: 2020-03-31

## 2020-04-02 ENCOUNTER — OFFICE VISIT (OUTPATIENT)
Dept: PRIMARY CARE CLINIC | Age: 70
End: 2020-04-02
Payer: MEDICARE

## 2020-04-02 VITALS
SYSTOLIC BLOOD PRESSURE: 126 MMHG | DIASTOLIC BLOOD PRESSURE: 76 MMHG | WEIGHT: 266 LBS | BODY MASS INDEX: 36.03 KG/M2 | TEMPERATURE: 98.2 F | HEIGHT: 72 IN | HEART RATE: 60 BPM | OXYGEN SATURATION: 97 %

## 2020-04-02 PROBLEM — R97.20 ELEVATED PSA: Status: ACTIVE | Noted: 2020-04-02

## 2020-04-02 PROCEDURE — 99214 OFFICE O/P EST MOD 30 MIN: CPT | Performed by: INTERNAL MEDICINE

## 2020-04-02 RX ORDER — ASCORBIC ACID 500 MG
500 TABLET ORAL EVERY OTHER DAY
COMMUNITY

## 2020-04-02 RX ORDER — PREDNISONE 20 MG/1
60 TABLET ORAL DAILY
Qty: 30 TABLET | Refills: 0 | Status: SHIPPED | OUTPATIENT
Start: 2020-04-02 | End: 2020-04-12

## 2020-04-02 NOTE — PROGRESS NOTES
PAIN- TASH EXERCISES RECOMMENDED 3/11  9. ESOPHAGITIS/PUD-  VITO  10. FEVER/DIARRHEA/RASH - PROBABLY VIRAL 12 ADMITTED TO [facility]- SEE REPORTS  11. HYPOGONADISM- MRI ORDERED 12  12. SNORING-FATIGUE- SLEEP APNEA- SLEEP STUDY ORDERED 10/13-CPAP BEGUN  13. LUMBAR RADICULOPATHY-8/17/15-PHILLIP/DONNY-SURGERY SCHEDULED 17  14. PNEUMONIA- 5/10/16-CXR CLEARED  15. CARDIOMEGALY-16-NORMAL ECHO 16 and  16. LOW BACK PAIN- REFERRED TO [facility] 18  17. OBESITY- GLP1 DISCUSSED 10/31/18  18. CHF-- referred to [facility]. Bilateral Carpal Tunnel- wrist splint   20. Abnormal stres- cath planned 12/10/2019  21. CVA-   22. CAD- CABG 2019  23. POST-OP A FIB-resolved  24. LIPOMA left leg 2020  25. FATIGUE-multifactorial.  Work-up in progress. Decrease rosuvastatin to 10 mg on  and Friday. 2020  26. Recurrent lumbar radiculopathy-2020  27. Elevated PSA- 2020-Referred to [facility]  Surgical Hx:  Knee Replacement - () BILATERAL-  1. He had right shoulder surgery in .  2. He had a vasectomy at age 33.  1. He had bilateral knee replacements in . 4. Sebaceous cyst removal by Dr. Logan Crawley in . 5. Basal cell carcinoma by Dr. Dannielle Dela Cruz in . 6. CABG- BHAKTI- 2019  7. LUMBAR surgery- Renee Cuenca  Reviewed and updated. FH:  Father:  Cerebrovascular Accident (CVA) - age 80. Mother:  Leukemia -  in her 29's. Reviewed, no changes. SH:  Marital: . Personal Habits: Cigarette Use: Former Cigarette Smoker 1 Pack Daily - for 30 years Negative For  current cigarette smoker. Alcohol: Occasionally consumes alcohol. Daily Caffeine: Consumes on  average 4 cups of hot tea per day - more recently 2 cups. Exercise Type: Walks sporadically. Reviewed, no changes. Date: 2020  Was the patient queried about smoking behavior? Yes   Does the patient currently smoke? Smoking: Patient is a former smoker.     Current Outpatient Medications:     vitamin C (ASCORBIC ACID) 500 MG tablet, Take 500 mg by mouth 2 times daily, Disp: , Rfl:     metoprolol succinate (TOPROL XL) 25 MG extended release tablet, Take 1 tablet by mouth 2 times daily, Disp: 30 tablet, Rfl: 3    rosuvastatin (CRESTOR) 10 MG tablet, Take 1 tablet by mouth nightly, Disp: 30 tablet, Rfl: 3    aspirin 81 MG chewable tablet, Take 1 tablet by mouth daily, Disp: 30 tablet, Rfl: 3    clopidogrel (PLAVIX) 75 MG tablet, Take 1 tablet by mouth daily, Disp: 60 tablet, Rfl: 0  Allergies   Allergen Reactions    Statins Other (See Comments)     Myalgias        Past Medical History:   Diagnosis Date    Basal cell carcinoma     CAD (coronary artery disease) 11/2019    Cardiomegaly 05/17/2016    Chronic back pain     Chronic back pain     CPAP (continuous positive airway pressure) dependence     Erectile dysfunction     Erectile dysfunction     Esophagitis 09/2011    VITO    H/O cardiomegaly     NORMAL ECHO IN 2016    H/O hypogonadism 2012    History of basal cell carcinoma     History of pneumonia 2016    History of sebaceous cyst     Hyperlipidemia     Hypertension     Hypogonadism in male 12/18/2012    Lumbar radiculopathy 08/17/2015    Obesity     Obesity     Osteoarthritis     Postoperative atrial fibrillation ([facility] [address] 12/24/2019    Sleep apnea      Past Surgical History:   Procedure Laterality Date    CARDIAC CATHETERIZATION  12/10/2019    scavina  CT surgery consult    CORONARY ARTERY BYPASS GRAFT N/A 12/23/2019    CABG CORONARY ARTERY BYPASS, SHANTELL performed by Regine Luna MD at [address]  [address]    [address] [place] [address] ESOPHGOSCOPY  9/10/2011    foreign body removal    JOINT REPLACEMENT Bilateral 2004    knees    KNEE SURGERY  2004    bilateral knee replacements    SHOULDER SURGERY  1997    right rotator cuff    SHOULDER SURGERY Right 1997    SKIN CANCER EXCISION  2003    CUDDAPAH    VASECTOMY      AGE 33     Family History   Problem Relation

## 2020-04-07 ENCOUNTER — TELEPHONE (OUTPATIENT)
Dept: FAMILY MEDICINE CLINIC | Age: 70
End: 2020-04-07

## 2020-04-14 ENCOUNTER — VIRTUAL VISIT (OUTPATIENT)
Dept: PRIMARY CARE CLINIC | Age: 70
End: 2020-04-14
Payer: MEDICARE

## 2020-04-14 PROCEDURE — 99213 OFFICE O/P EST LOW 20 MIN: CPT | Performed by: INTERNAL MEDICINE

## 2020-04-14 RX ORDER — OXYCODONE HYDROCHLORIDE AND ACETAMINOPHEN 5; 325 MG/1; MG/1
1 TABLET ORAL EVERY 6 HOURS PRN
Qty: 28 TABLET | Refills: 0 | Status: SHIPPED | OUTPATIENT
Start: 2020-04-14 | End: 2020-04-21

## 2020-04-14 RX ORDER — ROSUVASTATIN CALCIUM 10 MG/1
10 TABLET, COATED ORAL EVERY OTHER DAY
Qty: 15 TABLET | Refills: 5 | Status: ON HOLD
Start: 2020-04-14 | End: 2020-05-29 | Stop reason: SINTOL

## 2020-05-04 RX ORDER — OXYCODONE HYDROCHLORIDE AND ACETAMINOPHEN 5; 325 MG/1; MG/1
1 TABLET ORAL 2 TIMES DAILY PRN
Qty: 16 TABLET | Refills: 0 | Status: SHIPPED
Start: 2020-05-04 | End: 2020-05-11 | Stop reason: SDUPTHER

## 2020-05-11 ENCOUNTER — VIRTUAL VISIT (OUTPATIENT)
Dept: PRIMARY CARE CLINIC | Age: 70
End: 2020-05-11
Payer: MEDICARE

## 2020-05-11 PROBLEM — Z98.890 HX OF LUMBOSACRAL SPINE SURGERY: Status: ACTIVE | Noted: 2020-05-11

## 2020-05-11 PROCEDURE — 99213 OFFICE O/P EST LOW 20 MIN: CPT | Performed by: INTERNAL MEDICINE

## 2020-05-11 RX ORDER — OXYCODONE HYDROCHLORIDE AND ACETAMINOPHEN 5; 325 MG/1; MG/1
1 TABLET ORAL 2 TIMES DAILY PRN
Qty: 60 TABLET | Refills: 0 | Status: SHIPPED
Start: 2020-05-11 | End: 2020-06-05 | Stop reason: SDUPTHER

## 2020-05-11 NOTE — PROGRESS NOTES
PNEUMONIA- 5/10/16-CXR CLEARED  15. CARDIOMEGALY-16-NORMAL ECHO 16 and  16. LOW BACK PAIN- REFERRED TO [facility] 18  17. OBESITY- GLP1 DISCUSSED 10/31/18  18. CHF-- referred to [facility]. Bilateral Carpal Tunnel- wrist splint   20. Abnormal stres- cath planned 12/10/2019  21. CVA-   22. CAD- CABG 2019  23. POST-OP A FIB-resolved  24. LIPOMA left leg 2020  25. FATIGUE-multifactorial.  Work-up in progress. Decrease rosuvastatin to 10 mg on  and Friday. 2020  26. Recurrent lumbar radiculopathy-2020  27. Elevated PSA- 2020-Referred to [facility]  Surgical Hx:  Knee Replacement - () BILATERAL-  1. He had right shoulder surgery in .  2. He had a vasectomy at age 33.  1. He had bilateral knee replacements in . 4. Sebaceous cyst removal by Dr. Amauri Figueroa in . 5. Basal cell carcinoma by Dr. Willy Angel in . 6. CABG- BHAKTI- 2019  7. LUMBAR surgery- Isabela Deleon  Reviewed and updated. FH:  Father:  Cerebrovascular Accident (CVA) - age 80. Mother:  Leukemia -  in her 29's. Reviewed, no changes. SH:  Marital: . Personal Habits: Cigarette Use: Former Cigarette Smoker 1 Pack Daily - for 30 years Negative For  current cigarette smoker. Alcohol: Occasionally consumes alcohol. Daily Caffeine: Consumes on  average 4 cups of hot tea per day - more recently 2 cups. Exercise Type: Walks sporadically. Reviewed, no changes. Date: 2020  Was the patient queried about smoking behavior? Yes   Does the patient currently smoke? Smoking: Patient is a former smoker. Current Outpatient Medications:     oxyCODONE-acetaminophen (PERCOCET) 5-325 MG per tablet, Take 1 tablet by mouth 2 times daily as needed for Pain for up to 8 days. Intended supply: 3 days.  Take lowest dose possible to manage pain, Disp: 16 tablet, Rfl: 0    rosuvastatin (CRESTOR) 10 MG tablet, Take 1 tablet by mouth every other day (Patient taking differently: Take 10 mg

## 2020-05-12 ENCOUNTER — HOSPITAL ENCOUNTER (OUTPATIENT)
Age: 70
Discharge: HOME | End: 2020-05-14
Payer: MEDICARE

## 2020-05-12 LAB
ALBUMIN SERPL-MCNC: 4.1 G/DL (ref 3.5–5.2)
ALP BLD-CCNC: 82 U/L (ref 40–129)
ALT SERPL-CCNC: 20 U/L (ref 0–40)
ANION GAP SERPL CALCULATED.3IONS-SCNC: 14 MMOL/L (ref 7–16)
AST SERPL-CCNC: 30 U/L (ref 0–39)
BILIRUB SERPL-MCNC: 0.4 MG/DL (ref 0–1.2)
BUN BLDV-MCNC: 15 MG/DL (ref 8–23)
CALCIUM SERPL-MCNC: 9.7 MG/DL (ref 8.6–10.2)
CHLORIDE BLD-SCNC: 101 MMOL/L (ref 98–107)
CO2: 25 MMOL/L (ref 22–29)
CREAT SERPL-MCNC: 1 MG/DL (ref 0.7–1.2)
GFR AFRICAN AMERICAN: >60
GFR NON-AFRICAN AMERICAN: >60 ML/MIN/1.73
GLUCOSE BLD-MCNC: 69 MG/DL (ref 74–99)
POTASSIUM SERPL-SCNC: 4.4 MMOL/L (ref 3.5–5)
SODIUM BLD-SCNC: 140 MMOL/L (ref 132–146)
TOTAL PROTEIN: 7.9 G/DL (ref 6.4–8.3)

## 2020-05-12 PROCEDURE — 80053 COMPREHEN METABOLIC PANEL: CPT

## 2020-05-12 PROCEDURE — 36415 COLL VENOUS BLD VENIPUNCTURE: CPT

## 2020-05-29 ENCOUNTER — HOSPITAL ENCOUNTER (INPATIENT)
Age: 70
LOS: 3 days | Discharge: HOME | DRG: 066 | End: 2020-06-01
Attending: INTERNAL MEDICINE | Admitting: INTERNAL MEDICINE
Payer: MEDICARE

## 2020-05-29 PROBLEM — I60.9 SUBARACHNOID HEMORRHAGE (HCC): Status: ACTIVE | Noted: 2020-05-29

## 2020-05-29 PROBLEM — R56.9 SEIZURE-LIKE ACTIVITY (HCC): Status: ACTIVE | Noted: 2020-05-29

## 2020-05-29 PROCEDURE — 6360000002 HC RX W HCPCS: Performed by: PHYSICIAN ASSISTANT

## 2020-05-29 PROCEDURE — 2060000000 HC ICU INTERMEDIATE R&B

## 2020-05-29 PROCEDURE — 96375 TX/PRO/DX INJ NEW DRUG ADDON: CPT

## 2020-05-29 PROCEDURE — 2580000003 HC RX 258: Performed by: PHYSICIAN ASSISTANT

## 2020-05-29 PROCEDURE — 6370000000 HC RX 637 (ALT 250 FOR IP): Performed by: PHYSICIAN ASSISTANT

## 2020-05-29 RX ORDER — ONDANSETRON 2 MG/ML
4 INJECTION INTRAMUSCULAR; INTRAVENOUS EVERY 6 HOURS PRN
Status: DISCONTINUED | OUTPATIENT
Start: 2020-05-29 | End: 2020-06-01 | Stop reason: HOSPADM

## 2020-05-29 RX ORDER — SODIUM CHLORIDE 9 MG/ML
INJECTION, SOLUTION INTRAVENOUS CONTINUOUS
Status: DISCONTINUED | OUTPATIENT
Start: 2020-05-29 | End: 2020-06-01

## 2020-05-29 RX ORDER — ACETAMINOPHEN 325 MG/1
650 TABLET ORAL EVERY 6 HOURS PRN
Status: DISCONTINUED | OUTPATIENT
Start: 2020-05-29 | End: 2020-06-01 | Stop reason: HOSPADM

## 2020-05-29 RX ORDER — ROSUVASTATIN CALCIUM 10 MG/1
10 TABLET, COATED ORAL
Status: DISCONTINUED | OUTPATIENT
Start: 2020-05-29 | End: 2020-06-01

## 2020-05-29 RX ORDER — SODIUM CHLORIDE 0.9 % (FLUSH) 0.9 %
10 SYRINGE (ML) INJECTION EVERY 12 HOURS SCHEDULED
Status: DISCONTINUED | OUTPATIENT
Start: 2020-05-29 | End: 2020-06-01 | Stop reason: HOSPADM

## 2020-05-29 RX ORDER — LORAZEPAM 2 MG/ML
1 INJECTION INTRAMUSCULAR EVERY 4 HOURS PRN
Status: DISCONTINUED | OUTPATIENT
Start: 2020-05-29 | End: 2020-06-01 | Stop reason: HOSPADM

## 2020-05-29 RX ORDER — LEVETIRACETAM 5 MG/ML
500 INJECTION INTRAVASCULAR EVERY 12 HOURS
Status: DISCONTINUED | OUTPATIENT
Start: 2020-05-29 | End: 2020-06-01 | Stop reason: HOSPADM

## 2020-05-29 RX ORDER — PROMETHAZINE HYDROCHLORIDE 25 MG/1
12.5 TABLET ORAL EVERY 6 HOURS PRN
Status: DISCONTINUED | OUTPATIENT
Start: 2020-05-29 | End: 2020-06-01 | Stop reason: HOSPADM

## 2020-05-29 RX ORDER — POTASSIUM CHLORIDE 7.45 MG/ML
10 INJECTION INTRAVENOUS PRN
Status: DISCONTINUED | OUTPATIENT
Start: 2020-05-29 | End: 2020-06-01 | Stop reason: HOSPADM

## 2020-05-29 RX ORDER — METOPROLOL SUCCINATE 25 MG/1
25 TABLET, EXTENDED RELEASE ORAL DAILY
Status: DISCONTINUED | OUTPATIENT
Start: 2020-05-29 | End: 2020-06-01 | Stop reason: HOSPADM

## 2020-05-29 RX ORDER — POTASSIUM CHLORIDE 20 MEQ/1
40 TABLET, EXTENDED RELEASE ORAL PRN
Status: DISCONTINUED | OUTPATIENT
Start: 2020-05-29 | End: 2020-06-01 | Stop reason: HOSPADM

## 2020-05-29 RX ORDER — ACETAMINOPHEN 650 MG/1
650 SUPPOSITORY RECTAL EVERY 6 HOURS PRN
Status: DISCONTINUED | OUTPATIENT
Start: 2020-05-29 | End: 2020-06-01 | Stop reason: HOSPADM

## 2020-05-29 RX ORDER — SODIUM CHLORIDE 0.9 % (FLUSH) 0.9 %
10 SYRINGE (ML) INJECTION PRN
Status: DISCONTINUED | OUTPATIENT
Start: 2020-05-29 | End: 2020-06-01 | Stop reason: HOSPADM

## 2020-05-29 RX ORDER — OXYCODONE HYDROCHLORIDE AND ACETAMINOPHEN 5; 325 MG/1; MG/1
1 TABLET ORAL 2 TIMES DAILY PRN
Status: DISCONTINUED | OUTPATIENT
Start: 2020-05-29 | End: 2020-06-01 | Stop reason: HOSPADM

## 2020-05-29 RX ADMIN — LEVETIRACETAM 500 MG: 5 INJECTION, SOLUTION INTRAVENOUS at 22:02

## 2020-05-29 RX ADMIN — SODIUM CHLORIDE, PRESERVATIVE FREE 10 ML: 5 INJECTION INTRAVENOUS at 22:02

## 2020-05-29 RX ADMIN — SODIUM CHLORIDE: 9 INJECTION, SOLUTION INTRAVENOUS at 22:02

## 2020-05-29 RX ADMIN — METOPROLOL SUCCINATE 25 MG: 25 TABLET, FILM COATED, EXTENDED RELEASE ORAL at 23:44

## 2020-05-29 ASSESSMENT — PAIN SCALES - GENERAL
PAINLEVEL_OUTOF10: 0
PAINLEVEL_OUTOF10: 0

## 2020-05-30 ENCOUNTER — APPOINTMENT (OUTPATIENT)
Dept: GENERAL RADIOLOGY | Age: 70
DRG: 066 | End: 2020-05-30
Attending: INTERNAL MEDICINE
Payer: MEDICARE

## 2020-05-30 ENCOUNTER — APPOINTMENT (OUTPATIENT)
Dept: CT IMAGING | Age: 70
DRG: 066 | End: 2020-05-30
Attending: INTERNAL MEDICINE
Payer: MEDICARE

## 2020-05-30 LAB
ANION GAP SERPL CALCULATED.3IONS-SCNC: 12 MMOL/L (ref 7–16)
BASOPHILS ABSOLUTE: 0.08 E9/L (ref 0–0.2)
BASOPHILS RELATIVE PERCENT: 0.8 % (ref 0–2)
BUN BLDV-MCNC: 14 MG/DL (ref 8–23)
CALCIUM SERPL-MCNC: 9.2 MG/DL (ref 8.6–10.2)
CHLORIDE BLD-SCNC: 103 MMOL/L (ref 98–107)
CO2: 26 MMOL/L (ref 22–29)
CREAT SERPL-MCNC: 0.9 MG/DL (ref 0.7–1.2)
EOSINOPHILS ABSOLUTE: 0.47 E9/L (ref 0.05–0.5)
EOSINOPHILS RELATIVE PERCENT: 4.6 % (ref 0–6)
GFR AFRICAN AMERICAN: >60
GFR NON-AFRICAN AMERICAN: >60 ML/MIN/1.73
GLUCOSE BLD-MCNC: 102 MG/DL (ref 74–99)
HCT VFR BLD CALC: 52.8 % (ref 37–54)
HEMOGLOBIN: 17.5 G/DL (ref 12.5–16.5)
IMMATURE GRANULOCYTES #: 0.08 E9/L
IMMATURE GRANULOCYTES %: 0.8 % (ref 0–5)
LYMPHOCYTES ABSOLUTE: 1.54 E9/L (ref 1.5–4)
LYMPHOCYTES RELATIVE PERCENT: 15.2 % (ref 20–42)
MCH RBC QN AUTO: 32.8 PG (ref 26–35)
MCHC RBC AUTO-ENTMCNC: 33.1 % (ref 32–34.5)
MCV RBC AUTO: 99.1 FL (ref 80–99.9)
MONOCYTES ABSOLUTE: 0.95 E9/L (ref 0.1–0.95)
MONOCYTES RELATIVE PERCENT: 9.4 % (ref 2–12)
NEUTROPHILS ABSOLUTE: 7.04 E9/L (ref 1.8–7.3)
NEUTROPHILS RELATIVE PERCENT: 69.2 % (ref 43–80)
PDW BLD-RTO: 13.3 FL (ref 11.5–15)
PLATELET # BLD: 269 E9/L (ref 130–450)
PMV BLD AUTO: 10.8 FL (ref 7–12)
POTASSIUM REFLEX MAGNESIUM: 4.2 MMOL/L (ref 3.5–5)
RBC # BLD: 5.33 E12/L (ref 3.8–5.8)
SODIUM BLD-SCNC: 141 MMOL/L (ref 132–146)
WBC # BLD: 10.2 E9/L (ref 4.5–11.5)

## 2020-05-30 PROCEDURE — 80061 LIPID PANEL: CPT

## 2020-05-30 PROCEDURE — 96366 THER/PROPH/DIAG IV INF ADDON: CPT

## 2020-05-30 PROCEDURE — 2580000003 HC RX 258: Performed by: PHYSICIAN ASSISTANT

## 2020-05-30 PROCEDURE — 96365 THER/PROPH/DIAG IV INF INIT: CPT

## 2020-05-30 PROCEDURE — 6370000000 HC RX 637 (ALT 250 FOR IP): Performed by: PHYSICIAN ASSISTANT

## 2020-05-30 PROCEDURE — 80048 BASIC METABOLIC PNL TOTAL CA: CPT

## 2020-05-30 PROCEDURE — 72120 X-RAY BEND ONLY L-S SPINE: CPT

## 2020-05-30 PROCEDURE — 99222 1ST HOSP IP/OBS MODERATE 55: CPT | Performed by: NEUROLOGICAL SURGERY

## 2020-05-30 PROCEDURE — 85025 COMPLETE CBC W/AUTO DIFF WBC: CPT

## 2020-05-30 PROCEDURE — 70450 CT HEAD/BRAIN W/O DYE: CPT

## 2020-05-30 PROCEDURE — 36415 COLL VENOUS BLD VENIPUNCTURE: CPT

## 2020-05-30 PROCEDURE — 2060000000 HC ICU INTERMEDIATE R&B

## 2020-05-30 PROCEDURE — 6360000002 HC RX W HCPCS: Performed by: PHYSICIAN ASSISTANT

## 2020-05-30 PROCEDURE — 83036 HEMOGLOBIN GLYCOSYLATED A1C: CPT

## 2020-05-30 PROCEDURE — 99222 1ST HOSP IP/OBS MODERATE 55: CPT | Performed by: PSYCHIATRY & NEUROLOGY

## 2020-05-30 RX ADMIN — LEVETIRACETAM 500 MG: 5 INJECTION, SOLUTION INTRAVENOUS at 19:45

## 2020-05-30 RX ADMIN — OXYCODONE HYDROCHLORIDE AND ACETAMINOPHEN 1 TABLET: 5; 325 TABLET ORAL at 15:00

## 2020-05-30 RX ADMIN — METOPROLOL SUCCINATE 25 MG: 25 TABLET, FILM COATED, EXTENDED RELEASE ORAL at 08:48

## 2020-05-30 RX ADMIN — LEVETIRACETAM 500 MG: 5 INJECTION, SOLUTION INTRAVENOUS at 08:47

## 2020-05-30 RX ADMIN — OXYCODONE HYDROCHLORIDE AND ACETAMINOPHEN 1 TABLET: 5; 325 TABLET ORAL at 05:42

## 2020-05-30 RX ADMIN — SODIUM CHLORIDE, PRESERVATIVE FREE 10 ML: 5 INJECTION INTRAVENOUS at 00:08

## 2020-05-30 ASSESSMENT — PAIN DESCRIPTION - ONSET: ONSET: PROGRESSIVE

## 2020-05-30 ASSESSMENT — PAIN SCALES - GENERAL
PAINLEVEL_OUTOF10: 5
PAINLEVEL_OUTOF10: 5
PAINLEVEL_OUTOF10: 4
PAINLEVEL_OUTOF10: 8
PAINLEVEL_OUTOF10: 5

## 2020-05-30 ASSESSMENT — PAIN DESCRIPTION - PROGRESSION: CLINICAL_PROGRESSION: NOT CHANGED

## 2020-05-30 ASSESSMENT — PAIN DESCRIPTION - PAIN TYPE
TYPE: CHRONIC PAIN

## 2020-05-30 ASSESSMENT — PAIN DESCRIPTION - LOCATION
LOCATION: BACK

## 2020-05-30 ASSESSMENT — PAIN DESCRIPTION - DESCRIPTORS: DESCRIPTORS: DISCOMFORT

## 2020-05-30 ASSESSMENT — PAIN DESCRIPTION - FREQUENCY: FREQUENCY: INTERMITTENT

## 2020-05-30 ASSESSMENT — PAIN DESCRIPTION - ORIENTATION: ORIENTATION: LOWER

## 2020-05-30 ASSESSMENT — PAIN - FUNCTIONAL ASSESSMENT: PAIN_FUNCTIONAL_ASSESSMENT: ACTIVITIES ARE NOT PREVENTED

## 2020-05-30 NOTE — CONSULTS
could be a hemorrhage. LABORATORY VALUES:  Hemoglobin is 17.5, hematocrit is 52.8, white blood  cell count is 10.2, platelet count is 615. ASSESSMENT AND PLAN:  The patient is a 69-year-old gentleman with  hyperdensity on CT scan that could be consistent with subarachnoid  hemorrhage. He is neurologically stable. He also complains of back and  right lower extremity pain. The plan is to get a repeat CT scan of his  head. He also needs an MRI of his brain and in light of the fact that  he continues to complain of back and right lower extremity pain, we will  also get an MRI of his lumbar spine with contrast as he has had prior  surgery. He is extremely claustrophobic and he is obese, as a result of  that he has requested to have his MRI done with anesthesia. We will go  ahead and put in an anesthesia consult.         Tiny Palomares MD    D: 05/30/2020 8:32:45       T: 05/30/2020 10:19:26     [email or web address]  Job#: [id number]     Doc#: [id number]    CC:

## 2020-05-30 NOTE — H&P
Flor Collins M.D. History and Physical      CHIEF COMPLAINT: Altered mental  status    Reason for Admission: Transferred from [facility] for subarachnoid hemorrhage and seizure activity    History Obtained From: Patient/EMR    HISTORY OF PRESENT ILLNESS:      The patient is a 72 y.o. male of Erika Deleon MD with significant past medical history of basal cell carcinoma, coronary artery disease, hypertension/hyperlipidemia/sleep apnea who presents after transfer from [facility] for finding of subarachnoid hemorrhage on CT head. He reportedly had reports of seizure-like activity at home . Patient was transferred to [facility] for neurosurgical evaluation. On my evaluation,answers questions appropriately . Complaining of  left arm soreness more than weakness.  Chronic back pian, no ha , visual changes or motor deficits,   /67   Pulse 68   Temp 98.6 °F (37 °C) (Temporal)   Resp 16   Ht 6' (1.829 m)   Wt 255 lb (115.7 kg)   SpO2 96%   BMI 34.58 kg/m²     Ct head unremarkable   Past Medical History:        Diagnosis Date    Basal cell carcinoma     CAD (coronary artery disease) 11/2019    Cardiomegaly 05/17/2016    Chronic back pain     Chronic back pain     CPAP (continuous positive airway pressure) dependence     Erectile dysfunction     Erectile dysfunction     Esophagitis 09/2011    VITO    H/O cardiomegaly     NORMAL ECHO IN 2016    H/O hypogonadism 2012    History of basal cell carcinoma     History of pneumonia 2016    History of sebaceous cyst     Hyperlipidemia     Hypertension     Hypogonadism in male 12/18/2012    Lumbar radiculopathy 08/17/2015    Obesity     Obesity     Osteoarthritis     Postoperative atrial fibrillation ([address] 12/24/2019    Sleep apnea      Past Surgical History:        Procedure Laterality Date    CARDIAC CATHETERIZATION  12/10/2019    scavina  CT surgery consult    CORONARY ARTERY BYPASS GRAFT N/A 12/23/2019 OT  Discharge plan        Rudy Cavanaugh MD  5/30/2020  9:50 AM

## 2020-05-31 ENCOUNTER — APPOINTMENT (OUTPATIENT)
Dept: MRI IMAGING | Age: 70
DRG: 066 | End: 2020-05-31
Attending: INTERNAL MEDICINE
Payer: MEDICARE

## 2020-05-31 ENCOUNTER — ANESTHESIA (OUTPATIENT)
Dept: MRI IMAGING | Age: 70
DRG: 066 | End: 2020-05-31
Payer: MEDICARE

## 2020-05-31 ENCOUNTER — ANESTHESIA EVENT (OUTPATIENT)
Dept: MRI IMAGING | Age: 70
DRG: 066 | End: 2020-05-31
Payer: MEDICARE

## 2020-05-31 VITALS
SYSTOLIC BLOOD PRESSURE: 130 MMHG | DIASTOLIC BLOOD PRESSURE: 76 MMHG | RESPIRATION RATE: 15 BRPM | OXYGEN SATURATION: 97 %

## 2020-05-31 LAB
ANION GAP SERPL CALCULATED.3IONS-SCNC: 17 MMOL/L (ref 7–16)
BUN BLDV-MCNC: 15 MG/DL (ref 8–23)
CALCIUM SERPL-MCNC: 9 MG/DL (ref 8.6–10.2)
CHLORIDE BLD-SCNC: 104 MMOL/L (ref 98–107)
CHOLESTEROL, TOTAL: 250 MG/DL (ref 0–199)
CO2: 21 MMOL/L (ref 22–29)
CREAT SERPL-MCNC: 0.8 MG/DL (ref 0.7–1.2)
GFR AFRICAN AMERICAN: >60
GFR NON-AFRICAN AMERICAN: >60 ML/MIN/1.73
GLUCOSE BLD-MCNC: 87 MG/DL (ref 74–99)
HBA1C MFR BLD: 6.3 % (ref 4–5.6)
HDLC SERPL-MCNC: 48 MG/DL
LDL CHOLESTEROL CALCULATED: 172 MG/DL (ref 0–99)
POTASSIUM REFLEX MAGNESIUM: 4.2 MMOL/L (ref 3.5–5)
SODIUM BLD-SCNC: 142 MMOL/L (ref 132–146)
TRIGL SERPL-MCNC: 148 MG/DL (ref 0–149)
VLDLC SERPL CALC-MCNC: 30 MG/DL

## 2020-05-31 PROCEDURE — 7100000000 HC PACU RECOVERY - FIRST 15 MIN

## 2020-05-31 PROCEDURE — 80048 BASIC METABOLIC PNL TOTAL CA: CPT

## 2020-05-31 PROCEDURE — 7100000001 HC PACU RECOVERY - ADDTL 15 MIN

## 2020-05-31 PROCEDURE — 72158 MRI LUMBAR SPINE W/O & W/DYE: CPT

## 2020-05-31 PROCEDURE — 70553 MRI BRAIN STEM W/O & W/DYE: CPT

## 2020-05-31 PROCEDURE — 6360000004 HC RX CONTRAST MEDICATION: Performed by: RADIOLOGY

## 2020-05-31 PROCEDURE — 96366 THER/PROPH/DIAG IV INF ADDON: CPT

## 2020-05-31 PROCEDURE — 36415 COLL VENOUS BLD VENIPUNCTURE: CPT

## 2020-05-31 PROCEDURE — 3700000000 HC ANESTHESIA ATTENDED CARE

## 2020-05-31 PROCEDURE — 6360000002 HC RX W HCPCS

## 2020-05-31 PROCEDURE — 2060000000 HC ICU INTERMEDIATE R&B

## 2020-05-31 PROCEDURE — A9579 GAD-BASE MR CONTRAST NOS,1ML: HCPCS | Performed by: RADIOLOGY

## 2020-05-31 PROCEDURE — 6370000000 HC RX 637 (ALT 250 FOR IP): Performed by: PHYSICIAN ASSISTANT

## 2020-05-31 PROCEDURE — 3700000001 HC ADD 15 MINUTES (ANESTHESIA)

## 2020-05-31 PROCEDURE — 2580000003 HC RX 258

## 2020-05-31 PROCEDURE — 6360000002 HC RX W HCPCS: Performed by: PHYSICIAN ASSISTANT

## 2020-05-31 PROCEDURE — 99232 SBSQ HOSP IP/OBS MODERATE 35: CPT | Performed by: PHYSICIAN ASSISTANT

## 2020-05-31 PROCEDURE — 2580000003 HC RX 258: Performed by: PHYSICIAN ASSISTANT

## 2020-05-31 RX ORDER — SODIUM CHLORIDE 9 MG/ML
INJECTION, SOLUTION INTRAVENOUS CONTINUOUS PRN
Status: DISCONTINUED | OUTPATIENT
Start: 2020-05-31 | End: 2020-05-31 | Stop reason: SDUPTHER

## 2020-05-31 RX ORDER — MEPERIDINE HYDROCHLORIDE 25 MG/ML
12.5 INJECTION INTRAMUSCULAR; INTRAVENOUS; SUBCUTANEOUS EVERY 5 MIN PRN
Status: DISCONTINUED | OUTPATIENT
Start: 2020-05-31 | End: 2020-06-01 | Stop reason: HOSPADM

## 2020-05-31 RX ORDER — PROPOFOL 10 MG/ML
INJECTION, EMULSION INTRAVENOUS CONTINUOUS PRN
Status: DISCONTINUED | OUTPATIENT
Start: 2020-05-31 | End: 2020-05-31 | Stop reason: SDUPTHER

## 2020-05-31 RX ORDER — LABETALOL HYDROCHLORIDE 5 MG/ML
5 INJECTION, SOLUTION INTRAVENOUS EVERY 10 MIN PRN
Status: DISCONTINUED | OUTPATIENT
Start: 2020-05-31 | End: 2020-06-01 | Stop reason: HOSPADM

## 2020-05-31 RX ORDER — MIDAZOLAM HYDROCHLORIDE 1 MG/ML
INJECTION INTRAMUSCULAR; INTRAVENOUS PRN
Status: DISCONTINUED | OUTPATIENT
Start: 2020-05-31 | End: 2020-05-31 | Stop reason: SDUPTHER

## 2020-05-31 RX ORDER — PROMETHAZINE HYDROCHLORIDE 25 MG/ML
25 INJECTION, SOLUTION INTRAMUSCULAR; INTRAVENOUS PRN
Status: DISCONTINUED | OUTPATIENT
Start: 2020-05-31 | End: 2020-06-01 | Stop reason: HOSPADM

## 2020-05-31 RX ADMIN — OXYCODONE HYDROCHLORIDE AND ACETAMINOPHEN 1 TABLET: 5; 325 TABLET ORAL at 01:20

## 2020-05-31 RX ADMIN — PROPOFOL 25 MCG/KG/MIN: 10 INJECTION, EMULSION INTRAVENOUS at 15:09

## 2020-05-31 RX ADMIN — MIDAZOLAM 2 MG: 1 INJECTION INTRAMUSCULAR; INTRAVENOUS at 15:09

## 2020-05-31 RX ADMIN — GADOTERIDOL 20 ML: 279.3 INJECTION, SOLUTION INTRAVENOUS at 15:24

## 2020-05-31 RX ADMIN — SODIUM CHLORIDE: 9 INJECTION, SOLUTION INTRAVENOUS at 01:23

## 2020-05-31 RX ADMIN — SODIUM CHLORIDE: 9 INJECTION, SOLUTION INTRAVENOUS at 15:06

## 2020-05-31 RX ADMIN — LEVETIRACETAM 500 MG: 5 INJECTION, SOLUTION INTRAVENOUS at 10:07

## 2020-05-31 RX ADMIN — OXYCODONE HYDROCHLORIDE AND ACETAMINOPHEN 1 TABLET: 5; 325 TABLET ORAL at 21:16

## 2020-05-31 RX ADMIN — LEVETIRACETAM 500 MG: 5 INJECTION, SOLUTION INTRAVENOUS at 21:17

## 2020-05-31 RX ADMIN — MIDAZOLAM 2 MG: 1 INJECTION INTRAMUSCULAR; INTRAVENOUS at 15:06

## 2020-05-31 RX ADMIN — METOPROLOL SUCCINATE 25 MG: 25 TABLET, FILM COATED, EXTENDED RELEASE ORAL at 10:07

## 2020-05-31 ASSESSMENT — PAIN SCALES - GENERAL
PAINLEVEL_OUTOF10: 0
PAINLEVEL_OUTOF10: 8
PAINLEVEL_OUTOF10: 4
PAINLEVEL_OUTOF10: 8
PAINLEVEL_OUTOF10: 0

## 2020-05-31 ASSESSMENT — PAIN DESCRIPTION - PROGRESSION: CLINICAL_PROGRESSION: NOT CHANGED

## 2020-05-31 NOTE — PLAN OF CARE
Problem: Falls - Risk of:  Goal: Will remain free from falls  Description: Will remain free from falls  5/31/2020 0337 by Moise Pollack RN  Outcome: Met This Shift  5/30/2020 1741 by Lin Christian RN  Outcome: Met This Shift  Goal: Absence of physical injury  Description: Absence of physical injury  5/31/2020 0337 by Moise Pollack RN  Outcome: Met This Shift  5/30/2020 1741 by Lin Christian RN  Outcome: Met This Shift     Problem: Pain:  Goal: Pain level will decrease  Description: Pain level will decrease  5/31/2020 0337 by Moise Pollack RN  Outcome: Met This Shift  5/30/2020 1741 by Lin Christian RN  Outcome: Met This Shift  Goal: Control of acute pain  Description: Control of acute pain  5/31/2020 0337 by Moise Pollack RN  Outcome: Met This Shift  5/30/2020 1741 by Lin Christian RN  Outcome: Met This Shift  Goal: Control of chronic pain  Description: Control of chronic pain  5/31/2020 0337 by Moise Pollack RN  Outcome: Met This Shift  5/30/2020 1741 by Lin Christian RN  Outcome: Met This Shift     Problem: Bleeding:  Goal: Will show no signs and symptoms of excessive bleeding  Description: Will show no signs and symptoms of excessive bleeding  Outcome: Met This Shift

## 2020-05-31 NOTE — ANESTHESIA PRE PROCEDURE
STRESS TEST 10/11/19  Reversible defect suspicious for ischemia involving the mid to distal anterior and anterolateral wall  EF 50%        CT HEAD 19  Narrative   Patient MRN:  [id number]   : 1950   Age: 77 years   Gender: Male   Order Date:  2019 8:00 AM   EXAM: CTA NECK W CONTRAST, CTA HEAD W CONTRAST   NUMBER OF IMAGES:  976   INDICATION:  stroke    stroke    COMPARISON: None       Technique: Low-dose CT  acquisition technique included one of   following options; 1 . Automated exposure control, 2. Adjustment of MA   and or KV according to patient's size or 3. Use of iterative   reconstruction.       Contiguous spiral images were obtained in the axial plane, prior to   and following the administration of intravenous contrast using CT   angiographic protocol. Sagittal and coronal images were reconstructed   from the axial plane acquisition. Additional 3-D and MIP   reconstructions were presented to aid in the interpretation of this   study. Images were obtained from the aortic arch cranially including   the brain. Contrast dose: 60 ml. Isovue-370 intravenously injected.       FINDINGS:        CTA neck: The great vessel origins are all widely patent. There is   less than or equal to 50% narrowing at the origin of the right   vertebral artery. The left vertebral artery is widely patent. There is   atherosclerotic plaque at the right carotid bifurcation with less than   50% narrowing at the right internal carotid artery. There is   atherosclerotic plaque at the left carotid bifurcation associated with   less than 50% narrowing at the left internal carotid artery. There is nonspecific mediastinal adenopathy seen in the upper chest.   The larynx and thyroid gland are unremarkable.       CTA brain: Precontrast imaging demonstrates no mass, hemorrhage or   midline shift.    Postcontrast: There is no evidence of large vessel occlusion, aneurysm   or vascular malformation.           Impression

## 2020-06-01 ENCOUNTER — APPOINTMENT (OUTPATIENT)
Dept: NEUROLOGY | Age: 70
DRG: 066 | End: 2020-06-01
Attending: INTERNAL MEDICINE
Payer: MEDICARE

## 2020-06-01 VITALS
HEART RATE: 68 BPM | OXYGEN SATURATION: 95 % | TEMPERATURE: 98.7 F | DIASTOLIC BLOOD PRESSURE: 86 MMHG | SYSTOLIC BLOOD PRESSURE: 140 MMHG | HEIGHT: 72 IN | RESPIRATION RATE: 20 BRPM | BODY MASS INDEX: 34.85 KG/M2 | WEIGHT: 257.3 LBS

## 2020-06-01 PROBLEM — I69.398 SEIZURE AS LATE EFFECT OF CEREBROVASCULAR ACCIDENT (CVA) (HCC): Status: ACTIVE | Noted: 2020-05-29

## 2020-06-01 LAB
ANION GAP SERPL CALCULATED.3IONS-SCNC: 14 MMOL/L (ref 7–16)
BUN BLDV-MCNC: 12 MG/DL (ref 8–23)
CALCIUM SERPL-MCNC: 9 MG/DL (ref 8.6–10.2)
CHLORIDE BLD-SCNC: 103 MMOL/L (ref 98–107)
CO2: 22 MMOL/L (ref 22–29)
CREAT SERPL-MCNC: 0.8 MG/DL (ref 0.7–1.2)
GFR AFRICAN AMERICAN: >60
GFR NON-AFRICAN AMERICAN: >60 ML/MIN/1.73
GLUCOSE BLD-MCNC: 99 MG/DL (ref 74–99)
POTASSIUM REFLEX MAGNESIUM: 3.7 MMOL/L (ref 3.5–5)
SODIUM BLD-SCNC: 139 MMOL/L (ref 132–146)

## 2020-06-01 PROCEDURE — 95819 EEG AWAKE AND ASLEEP: CPT | Performed by: PSYCHIATRY & NEUROLOGY

## 2020-06-01 PROCEDURE — 95819 EEG AWAKE AND ASLEEP: CPT

## 2020-06-01 PROCEDURE — 96376 TX/PRO/DX INJ SAME DRUG ADON: CPT

## 2020-06-01 PROCEDURE — 6360000002 HC RX W HCPCS: Performed by: PHYSICIAN ASSISTANT

## 2020-06-01 PROCEDURE — 36415 COLL VENOUS BLD VENIPUNCTURE: CPT

## 2020-06-01 PROCEDURE — 80048 BASIC METABOLIC PNL TOTAL CA: CPT

## 2020-06-01 PROCEDURE — 99232 SBSQ HOSP IP/OBS MODERATE 35: CPT | Performed by: NEUROLOGICAL SURGERY

## 2020-06-01 PROCEDURE — 97165 OT EVAL LOW COMPLEX 30 MIN: CPT

## 2020-06-01 PROCEDURE — 2580000003 HC RX 258: Performed by: PHYSICIAN ASSISTANT

## 2020-06-01 PROCEDURE — 6370000000 HC RX 637 (ALT 250 FOR IP): Performed by: NURSE PRACTITIONER

## 2020-06-01 PROCEDURE — 99233 SBSQ HOSP IP/OBS HIGH 50: CPT | Performed by: NURSE PRACTITIONER

## 2020-06-01 PROCEDURE — 6370000000 HC RX 637 (ALT 250 FOR IP): Performed by: PHYSICIAN ASSISTANT

## 2020-06-01 PROCEDURE — 97161 PT EVAL LOW COMPLEX 20 MIN: CPT | Performed by: PHYSICAL THERAPIST

## 2020-06-01 RX ORDER — PRAVASTATIN SODIUM 10 MG
10 TABLET ORAL NIGHTLY
Qty: 30 TABLET | Refills: 3 | Status: SHIPPED | OUTPATIENT
Start: 2020-06-01 | End: 2020-06-01

## 2020-06-01 RX ORDER — PRAVASTATIN SODIUM 10 MG
10 TABLET ORAL NIGHTLY
Status: DISCONTINUED | OUTPATIENT
Start: 2020-06-01 | End: 2020-06-01 | Stop reason: HOSPADM

## 2020-06-01 RX ORDER — LEVETIRACETAM 500 MG/1
500 TABLET ORAL 2 TIMES DAILY
Qty: 60 TABLET | Refills: 3 | Status: SHIPPED | OUTPATIENT
Start: 2020-06-01 | End: 2020-06-01 | Stop reason: SDUPTHER

## 2020-06-01 RX ORDER — ATORVASTATIN CALCIUM 40 MG/1
40 TABLET, FILM COATED ORAL DAILY
Qty: 30 TABLET | Refills: 3 | Status: SHIPPED
Start: 2020-06-01 | End: 2020-06-01 | Stop reason: HOSPADM

## 2020-06-01 RX ORDER — ASPIRIN 81 MG/1
81 TABLET, CHEWABLE ORAL DAILY
Status: DISCONTINUED | OUTPATIENT
Start: 2020-06-01 | End: 2020-06-01 | Stop reason: HOSPADM

## 2020-06-01 RX ORDER — LEVETIRACETAM 500 MG/1
500 TABLET ORAL 2 TIMES DAILY
Qty: 60 TABLET | Refills: 3 | Status: SHIPPED | OUTPATIENT
Start: 2020-06-01 | End: 2020-08-06 | Stop reason: SDUPTHER

## 2020-06-01 RX ORDER — PRAVASTATIN SODIUM 10 MG
10 TABLET ORAL NIGHTLY
Qty: 30 TABLET | Refills: 3 | Status: SHIPPED | OUTPATIENT
Start: 2020-06-01 | End: 2020-08-06 | Stop reason: SDUPTHER

## 2020-06-01 RX ADMIN — ASPIRIN 81 MG 81 MG: 81 TABLET ORAL at 15:41

## 2020-06-01 RX ADMIN — ROSUVASTATIN 10 MG: 10 TABLET, FILM COATED ORAL at 08:44

## 2020-06-01 RX ADMIN — METOPROLOL SUCCINATE 25 MG: 25 TABLET, FILM COATED, EXTENDED RELEASE ORAL at 08:43

## 2020-06-01 RX ADMIN — OXYCODONE HYDROCHLORIDE AND ACETAMINOPHEN 1 TABLET: 5; 325 TABLET ORAL at 05:04

## 2020-06-01 RX ADMIN — SODIUM CHLORIDE: 9 INJECTION, SOLUTION INTRAVENOUS at 03:13

## 2020-06-01 RX ADMIN — LEVETIRACETAM 500 MG: 5 INJECTION, SOLUTION INTRAVENOUS at 08:41

## 2020-06-01 ASSESSMENT — PAIN DESCRIPTION - FREQUENCY: FREQUENCY: CONTINUOUS

## 2020-06-01 ASSESSMENT — PAIN DESCRIPTION - DESCRIPTORS: DESCRIPTORS: ACHING;CONSTANT;DISCOMFORT

## 2020-06-01 ASSESSMENT — PAIN - FUNCTIONAL ASSESSMENT: PAIN_FUNCTIONAL_ASSESSMENT: ACTIVITIES ARE NOT PREVENTED

## 2020-06-01 ASSESSMENT — PAIN DESCRIPTION - PROGRESSION: CLINICAL_PROGRESSION: GRADUALLY IMPROVING

## 2020-06-01 ASSESSMENT — PAIN SCALES - GENERAL
PAINLEVEL_OUTOF10: 5
PAINLEVEL_OUTOF10: 4
PAINLEVEL_OUTOF10: 8

## 2020-06-01 ASSESSMENT — PAIN DESCRIPTION - DIRECTION: RADIATING_TOWARDS: RIGHT LEG

## 2020-06-01 ASSESSMENT — PAIN DESCRIPTION - ONSET: ONSET: ON-GOING

## 2020-06-01 ASSESSMENT — PAIN DESCRIPTION - PAIN TYPE: TYPE: CHRONIC PAIN

## 2020-06-01 ASSESSMENT — PAIN DESCRIPTION - LOCATION: LOCATION: BACK

## 2020-06-01 ASSESSMENT — PAIN DESCRIPTION - ORIENTATION: ORIENTATION: LOWER

## 2020-06-01 NOTE — PROCEDURES
EEG Report  Florinda De La Cruz is a 72 y.o. male      Appointment Date 6/1/2020   Appointment Time 9:00am   Facility Location [facility] EEG Number [id number]   Type of Study routine Floor 8512-A     Technical Specifications  Technician [facility] of consciousness Awake/sleep   Sleep deprived? no   Hyperventilation tested? no   Photic stim tested? yes   EEG recording Standard 10-20 electrode placement    Duration of recording 28 mins   EEG complete? Yes         Clinical History  Seizure.   Patient Active Problem List   Diagnosis    Diarrhea    Rash    Renal insufficiency    Hypertension    Primary osteoarthritis involving multiple joints    Hyperlipidemia    Obesity    Bilateral carpal tunnel syndrome    Erectile dysfunction    Sleep apnea    Unstable angina (HCC)    Chorea    [facility]    Angina of effort ([facility]    Coronary artery disease involving native coronary artery of native heart without angina pectoris    Hypogonadism in male    Osteoarthritis    Pleural effusion    Postoperative atrial fibrillation (HCC)    Iron deficiency anemia due to chronic blood loss    Chronic fatigue    Radiculopathy    Elevated PSA    Hx of lumbosacral spine surgery    Subarachnoid hemorrhage (HCC)    Seizure-like activity (HCC)         Medications    Current Facility-Administered Medications:     promethazine (PHENERGAN) injection 25 mg, 25 mg, Intravenous, PRN, Aguilar Arechiga MD    labetalol (NORMODYNE;TRANDATE) injection 5 mg, 5 mg, Intravenous, Q10 Min PRN, Aguilar Arechiga MD    meperidine (DEMEROL) injection 12.5 mg, 12.5 mg, Intravenous, Q5 Min PRN, Aguilar Arechiga MD    HYDROmorphone (DILAUDID) injection 0.25 mg, 0.25 mg, Intravenous, Q5 Min PRN, Aguilar Arechiga MD    HYDROmorphone (DILAUDID) injection 0.5 mg, 0.5 mg, Intravenous, Q5 Min PRN, Aguilar Arechiga MD    metoprolol succinate (TOPROL XL) extended release tablet 25 mg, 25 mg, Oral,

## 2020-06-01 NOTE — PROGRESS NOTES
5/31- Anesthesia not available at 8am- said to try back this afternoon.
Department of Neurosurgery  Progress Note    CHIEF COMPLAINT:sen for  Back pain and SAH    SUBJECTIVE:  Continued back pain and right leg pain. No new issues overnight. REVIEW OF SYSTEMS :  Constitutional: Negative for chills and fever. Neurological: Negative for dizziness, tremors and speech change. OBJECTIVE:   VITALS:  BP (!) 159/77   Pulse 62   Temp 97.9 °F (36.6 °C) (Temporal)   Resp 18   Ht 6' (1.829 m)   Wt 257 lb 4.8 oz (116.7 kg)   SpO2 95%   BMI 34.90 kg/m²     PHYSICAL:  Constitutional: Appears well-nourished. Head: Normocephalic and atraumatic. Eyes: EOM are normal. Pupils are equal, round, and reactive to light. Neck: Normal range of motion. No tracheal deviation present. Cardiovascular: Normal rate. Pulmonary/Chest: No stridor. Abdominal: No distension. Neurological:   Alert and oriented x3  Face symmetric  Motor strength full  Sensation intact to light touch   Skin: Skin is warm and dry.    Psychiatric: Thought content normal.       DATA:  CBC:   Lab Results   Component Value Date    WBC 10.2 05/30/2020    RBC 5.33 05/30/2020    HGB 17.5 05/30/2020    HCT 52.8 05/30/2020    MCV 99.1 05/30/2020    MCH 32.8 05/30/2020    MCHC 33.1 05/30/2020    RDW 13.3 05/30/2020     05/30/2020    MPV 10.8 05/30/2020     BMP:    Lab Results   Component Value Date     06/01/2020    K 3.7 06/01/2020     06/01/2020    CO2 22 06/01/2020    BUN 12 06/01/2020    LABALBU 3.9 05/29/2020    LABALBU 3.7 01/19/2012    CREATININE 0.8 06/01/2020    CALCIUM 9.0 06/01/2020    GFRAA >60 06/01/2020    LABGLOM >60 06/01/2020    GLUCOSE 99 06/01/2020    GLUCOSE 100 01/20/2012     PT/INR:    Lab Results   Component Value Date    PROTIME 13.6 12/23/2019    INR 1.2 12/23/2019     PTT:    Lab Results   Component Value Date    APTT 30.4 12/23/2019   [APTT}    Current Inpatient Medications  Current Facility-Administered Medications: aspirin chewable tablet 81 mg, 81 mg, Oral,
Doctor ordered for a anesthesia consult at 3pm. Patient being kept NPO at midnight and MRI will check with anesthesia tomorrow on what time we can fit in the double study with both MRIs schedule and Anesthesia.
EEG completed. Report to follow.   Toy Adams 6/1/2020
Ne hemorrhage on CT.   Await MRI    Emelyn Bronson
OCCUPATIONAL THERAPY INITIAL EVALUATION      Date:2020  Patient Name: Holden Luz  MRN: [id number]  : 1950  Room: [address] [place] [address]    Referring Provider: ZEE Espana  Evaluating OT: Raadbenita Gilson, OTR/L #[id number]    AM-PAC Daily Activity Raw Score:     Recommended Adaptive Equipment: no needs     Diagnosis: SAH       Pertinent Medical History: HTN, obesity, HLD, chronic back pain, OA, lumbar radiculopathy, CAD, cardiomegaly     Precautions:  Falls, seizure? Home Living: Pt lives with wife in a 1 story with 3 step(s) to enter and 1 rail(s); bed/bath on 1st   Bathroom setup: tub shower  Equipment owned: none  Prior Level of Function: Independent  with ADLs , Independent  with IADLs; using no AD for ambulation. Pain Level: none  Cognition: A&O: 4/4; Follows 2 step directions   Memory:  good    Sequencing:  good    Problem solving:  good    Judgement/safety:  good      Functional Assessment:   Initial Eval Status  Date: 20   Feeding Independent    Grooming Independent    UB Dressing Independent    LB Dressing Independent    Bathing Independent   Toileting Independent    Bed Mobility  Supine to sit: Independent   Sit to supine: Independent    Functional Transfers Sit to stand: Independent   Stand to sit: Independent   Stand pivot: Independent    Functional Mobility Indep with no AD   Balance Sitting:     Static:  wfl    Dynamic:wfl  Standing: wfl   Activity Tolerance wfl   Visual/  Perceptual Glasses: yes            Hand dominance: R  UE ROM: RUE:  WFL  LUE:  WFL  Strength: RUE: grossly 4/5 LUE: grossly 4/5   Strength: B WFL  Fine Motor Coordination:  B WFL    Hearing: WFL  Sensation:  No c/o numbness or tingling  Tone:  WFL  Edema: None noted                            Comments/Treatment: Upon arrival, patient sitting in chair. At end of session, patient sitting in chair with call light and phone within reach, all lines and tubes intact.   Pt demonstrating good understanding of
Occupational Therapy    OT order received. Chart reviewed. Await MRI results and NS recommendations for POC. Will re-attempt at a later time.      Blaine Severino, OTR/L [id number]
Pt transported back to floor with telepack.
50859  [] Moderate Complexity PT evaluation 88493  [] High Complexity PT evaluation 38334  [] PT Re-evaluation 66927  [] Gait training 30407 0 minutes  [] Manual therapy 85998 0 minutes  [] Therapeutic activities 09464 0 minutes  [] Therapeutic exercises 88603 0 minutes  [] Neuromuscular reeducation 43330 0 minutes     Jon Hutson, PT, DPT  [id number]
PA   Stopped at 06/01/20 0900    LORazepam (ATIVAN) injection 1 mg  1 mg Intravenous Q4H PRN ZEE Rosales         Objective:     BP (!) 159/77   Pulse 62   Temp 97.9 °F (36.6 °C) (Temporal)   Resp 18   Ht 6' (1.829 m)   Wt 257 lb 4.8 oz (116.7 kg)   SpO2 95%   BMI 34.90 kg/m²      General appearance: alert, appears stated age and cooperative--sitting up at bedside  Head: Normocephalic, without obvious abnormality, atraumatic  Eyes: conjunctivae/corneas clear.    Neck: no carotid bruit,  Lungs: clear to auscultation bilaterally  Heart: regular rate and rhythm--no murmur; NSR on telemetry  Extremities: extremities normal, atraumatic, no cyanosis or edema  Pulses: 2+ and symmetric  Skin: Skin color, texture, turgor normal. No rashes or lesions     Mental Status: Alert, oriented, thought content appropriate; very pleasant    Appropriate attention/concentration  Intact fundus of knowledge  Repetition intact  Intact memories    Speech: Clear   Language: Appropriate     Cranial Nerves:  I: smell    II: visual acuity     II: visual fields Full to confrontation   II: pupils SAMMIE   III,VII: ptosis None   III,IV,VI: extraocular muscles  Full ROM   V: mastication Normal   V: facial light touch sensation  Normal   V,VII: corneal reflex     VII: facial muscle function - upper  Normal   VII: facial muscle function - lower Normal   VIII: hearing Normal   IX: soft palate elevation  Normal   IX,X: gag reflex    XI: trapezius strength  5/5   XI: sternocleidomastoid strength 5/5   XI: neck extension strength  5/5   XII: tongue strength  Normal     Motor:  Mild right hemiparesis-- 4+/5 right arm  Obese bulk  No abnormal movements     Sensory:  LT normal   Coordination:   FNF, FFM symmetrical   HTS intact     Gait:  Normal    DTR:   No Levy's    Laboratory/Radiology:     CMP:    Lab Results   Component Value Date     06/01/2020    K 3.7 06/01/2020     06/01/2020    CO2 22 06/01/2020    BUN 12 06/01/2020
atraumatic  Eyes: conjunctivae/corneas clear.    Neck: no adenopathy, no carotid bruit, no JVD, supple, symmetrical, trachea midline and thyroid not enlarged, symmetric, no tenderness/mass/nodules  Lungs: clear to auscultation bilaterally  Heart: regular rate and rhythm, S1, S2 normal, no murmur, click, rub or gallop  Extremities: extremities normal, atraumatic, no cyanosis or edema  Pulses: 2+ and symmetric  Skin: Skin color, texture, turgor normal. No rashes or lesions     Mental Status: Alert, oriented, thought content appropriate     Appropriate attention/concentration  Intact fundus of knowledge  Repetition intact  Intact memories    Speech: Clear   Language: Appropriate     Cranial Nerves:  I: smell    II: visual acuity     II: visual fields Full to confrontation   II: pupils SAMMIE   III,VII: ptosis None   III,IV,VI: extraocular muscles  Full ROM   V: mastication Normal   V: facial light touch sensation  Normal   V,VII: corneal reflex     VII: facial muscle function - upper  Normal   VII: facial muscle function - lower Normal   VIII: hearing Normal   IX: soft palate elevation  Normal   IX,X: gag reflex    XI: trapezius strength  5/5   XI: sternocleidomastoid strength 5/5   XI: neck extension strength  5/5   XII: tongue strength  Normal     Motor:  5/5 throughout   Normal tone   Obese bulk  No abnormal movements     Sensory:  LT normal     Vibration moderately decreased ankles b/l     Coordination:   FNF, FFM symmetrical   HTS intact     DTR:     BE throughout     No Babinskis    No pathological reflexes    Laboratory/Radiology:     CBC with Differential:    Lab Results   Component Value Date    WBC 10.2 05/30/2020    RBC 5.33 05/30/2020    HGB 17.5 05/30/2020    HCT 52.8 05/30/2020     05/30/2020    MCV 99.1 05/30/2020    MCH 32.8 05/30/2020    MCHC 33.1 05/30/2020    RDW 13.3 05/30/2020    SEGSPCT 61.2 05/29/2020    LYMPHOPCT 15.2 05/30/2020    MONOPCT 9.4 05/30/2020    BASOPCT 0.8 05/30/2020    MONOSABS

## 2020-06-01 NOTE — DISCHARGE SUMMARY
punctate areas of increased attenuation are seen in the subarachnoid space of the   frontal lobes bilaterally. These could represent tiny subarachnoid hemorrhages. Such as seen with   a subtle shear injury. Alternatively sequela of meningitis, amyloid angiopathy, and metastatic   disease could have this appearance. Clinical correlate. 2.  Mild atrophy and small vessel ischemic changes of the brain. Dictated By: Brenna Lock DO    DD/DT: 20 1329               Signed By: Brenna Lock DO 20 1329              : KARLENE    Xr Lumbar Spine Flex And Ext Only    Result Date: 2020  Patient MRN: [id number] : 1950 Age:  72 years Gender: Male Order Date: 2020 8:30 AM Exam: XR LUMBAR SPINE FLEXION AND EXTENSION ONLY Number of Images: 3 views Indication:   back pain back pain Comparison: None. Findings: Mucosal flexion and extension lateral views of the lumbar spine demonstrate there is multilevel disc disease most severe at L5-S1. There are ventral marginal spurs seen at all level. . The vertebral bodies are intact and alignment is normal. The pedicles and articular facets are normally aligned. The transverse processes, neural lamina and neural spine outlines are normal..     NO ACUTE FRACTURE OR MALALIGNMENT Severe multilevel osteoarthritic changes and disc disease most pronounced at L5-S1. Flexion and extension radiographs demonstrate no evidence of any malalignment. Discharge Exam:    HEENT: NCAT,  PERRLA, No JVD  Heart:  RRR, no murmurs, gallops, or rubs.   Lungs:  CTA bilaterally, no wheeze, rales or rhonchi  Abd: bowel sounds present, nontender, nondistended, no masses  Extrem:  No clubbing, cyanosis, or edema    Disposition: home     Patient Condition at Discharge: Stable    Patient Instructions:      Medication List      START taking these medications    atorvastatin 40 MG tablet  Commonly known as:  Lipitor  Take 1 tablet by mouth daily        CHANGE how you take these medications    metoprolol succinate 25 MG extended release tablet  Commonly known as:  TOPROL XL  Take 1 tablet by mouth 2 times daily  What changed:  when to take this        CONTINUE taking these medications    aspirin 81 MG chewable tablet  Take 1 tablet by mouth daily     oxyCODONE-acetaminophen 5-325 MG per tablet  Commonly known as:  Percocet  Take 1 tablet by mouth 2 times daily as needed for Pain for up to 30 days. Intended supply: 3 days. Take lowest dose possible to manage pain     vitamin C 500 MG tablet  Commonly known as:  ASCORBIC ACID        STOP taking these medications    rosuvastatin 10 MG tablet  Commonly known as:  CRESTOR           Where to Get Your Medications      These medications were sent to [facility] [facility]    Phone:  [phone]   · atorvastatin 40 MG tablet       Activity: activity as tolerated  Diet: Cardiac    Pt has been advised to: Follow-up with José Antonio Irwin MD in 1 week.   Follow-up with consultants as recommended by them    Note that over 30 minutes was spent in preparing discharge papers, discussing discharge with patient, medication review, etc.    Signed:  Donnalee Eisenmenger, MD  6/1/2020  3:06 PM

## 2020-06-05 ENCOUNTER — OFFICE VISIT (OUTPATIENT)
Dept: FAMILY MEDICINE CLINIC | Age: 70
End: 2020-06-05
Payer: MEDICARE

## 2020-06-05 VITALS
HEIGHT: 72 IN | WEIGHT: 257 LBS | SYSTOLIC BLOOD PRESSURE: 144 MMHG | TEMPERATURE: 97.1 F | BODY MASS INDEX: 34.81 KG/M2 | HEART RATE: 82 BPM | DIASTOLIC BLOOD PRESSURE: 80 MMHG | OXYGEN SATURATION: 97 %

## 2020-06-05 PROCEDURE — 99214 OFFICE O/P EST MOD 30 MIN: CPT | Performed by: INTERNAL MEDICINE

## 2020-06-05 PROCEDURE — 1111F DSCHRG MED/CURRENT MED MERGE: CPT | Performed by: INTERNAL MEDICINE

## 2020-06-05 RX ORDER — OXYCODONE HYDROCHLORIDE AND ACETAMINOPHEN 5; 325 MG/1; MG/1
1 TABLET ORAL 2 TIMES DAILY PRN
Qty: 60 TABLET | Refills: 0 | Status: SHIPPED | OUTPATIENT
Start: 2020-06-05 | End: 2020-06-26 | Stop reason: SDUPTHER

## 2020-06-05 NOTE — PROGRESS NOTES
Post-Discharge Transitional Care Management Services or Hospital Follow Up      Sara Landers   YOB: 1950    Date of Office Visit:  6/5/2020  Date of Hospital Admission: 5/29/20  Date of Hospital Discharge: 6/1/20  Risk of hospital readmission (high >=14%.  Medium >=10%) :Readmission Risk Score: 9      Care management risk score Rising risk (score 2-5) and Complex Care (Scores >=6): 6     Non face to face  following discharge, date last encounter closed (first attempt may have been earlier): *No documented post hospital discharge outreach found in the last 14 days    Call initiated 2 business days of discharge: *No response recorded in the last 14 days    Patient Active Problem List   Diagnosis    Diarrhea    Rash    Renal insufficiency    Hypertension    Primary osteoarthritis involving multiple joints    Hyperlipidemia    Obesity    Bilateral carpal tunnel syndrome    Erectile dysfunction    Sleep apnea    Unstable angina ([address]    Chorea    Acute cerebrovascular accident (CVA) ([address]    Angina of effort ([address]    Coronary artery disease involving native coronary artery of native heart without angina pectoris    Hypogonadism in male    Osteoarthritis    Pleural effusion    Postoperative atrial fibrillation (HCC)    Iron deficiency anemia due to chronic blood loss    Chronic fatigue    Radiculopathy    Elevated PSA    Hx of lumbosacral spine surgery    Subarachnoid hemorrhage (HCC)    Seizure as late effect of cerebrovascular accident (CVA) ([address]       Allergies   Allergen Reactions    Statins Other (See Comments)     Myalgias        Medications listed as ordered at the time of discharge from hospital   Shawn SAL   [facility] Medication Instructions ANA LILIA:    Printed on:06/05/20 1289   Medication Information                      aspirin 81 MG chewable tablet  Take 1 tablet by mouth daily             levETIRAcetam (KEPPRA) 500 MG tablet  Take 1 tablet by mouth 2 times times daily as needed for Pain for up to 30 days. Intended supply: 3 days. Take lowest dose possible to manage pain  -     NC DISCHARGE MEDS RECONCILED W/ CURRENT OUTPATIENT MED LIST    Seizure as late effect of cerebrovascular accident (CVA) ([facility]  -     NC DISCHARGE MEDS RECONCILED W/ CURRENT OUTPATIENT MED LIST    Coronary artery disease involving native coronary artery of native heart without angina pectoris  -     NC DISCHARGE MEDS RECONCILED W/ CURRENT OUTPATIENT MED LIST    Primary osteoarthritis involving multiple joints    Mixed hyperlipidemia    Class 1 obesity due to excess calories with serious comorbidity and body mass index (BMI) of 32.0 to 32.9 in adult    The patient needs to follow-up with neurology within the next 3 to 4 weeks. He is to stay on the [address]. The patient has recently been seen by cardiology. If neurosurgical intervention is indicated he should be able to be cleared by cardiology relatively quickly. Patient is advised that he is not allowed to drive for at least 6 months post seizure. Neurology was going to be doing cardioembolic testing.

## 2020-06-23 ENCOUNTER — HOSPITAL ENCOUNTER (INPATIENT)
Age: 70
LOS: 2 days | Discharge: HOME | DRG: 392 | End: 2020-06-25
Attending: EMERGENCY MEDICINE | Admitting: INTERNAL MEDICINE
Payer: MEDICARE

## 2020-06-23 ENCOUNTER — APPOINTMENT (OUTPATIENT)
Dept: CT IMAGING | Age: 70
DRG: 392 | End: 2020-06-23
Payer: MEDICARE

## 2020-06-23 PROBLEM — I20.89 ANGINA OF EFFORT: Status: RESOLVED | Noted: 2019-12-03 | Resolved: 2020-06-23

## 2020-06-23 PROBLEM — I20.8 ANGINA OF EFFORT (HCC): Status: RESOLVED | Noted: 2019-12-03 | Resolved: 2020-06-23

## 2020-06-23 PROBLEM — I25.10 CAD (CORONARY ARTERY DISEASE): Status: ACTIVE | Noted: 2019-11-01

## 2020-06-23 PROBLEM — I63.9 ACUTE CEREBROVASCULAR ACCIDENT (CVA) (HCC): Status: RESOLVED | Noted: 2019-11-06 | Resolved: 2020-06-23

## 2020-06-23 PROBLEM — I20.0 UNSTABLE ANGINA (HCC): Status: RESOLVED | Noted: 2019-11-04 | Resolved: 2020-06-23

## 2020-06-23 PROBLEM — G25.5 CHOREA: Status: RESOLVED | Noted: 2019-11-05 | Resolved: 2020-06-23

## 2020-06-23 PROBLEM — I25.10 CORONARY ARTERY DISEASE INVOLVING NATIVE CORONARY ARTERY OF NATIVE HEART WITHOUT ANGINA PECTORIS: Status: RESOLVED | Noted: 2019-12-23 | Resolved: 2020-06-23

## 2020-06-23 PROBLEM — K57.92 ACUTE DIVERTICULITIS: Status: ACTIVE | Noted: 2020-06-23

## 2020-06-23 LAB
ALBUMIN SERPL-MCNC: 4.1 G/DL (ref 3.5–5.2)
ALP BLD-CCNC: 97 U/L (ref 40–129)
ALT SERPL-CCNC: 17 U/L (ref 0–40)
ANION GAP SERPL CALCULATED.3IONS-SCNC: 9 MMOL/L (ref 7–16)
AST SERPL-CCNC: 25 U/L (ref 0–39)
BASOPHILS ABSOLUTE: 0.06 E9/L (ref 0–0.2)
BASOPHILS RELATIVE PERCENT: 0.5 % (ref 0–2)
BILIRUB SERPL-MCNC: 0.8 MG/DL (ref 0–1.2)
BILIRUBIN URINE: NEGATIVE
BLOOD, URINE: NEGATIVE
BUN BLDV-MCNC: 13 MG/DL (ref 8–23)
CALCIUM SERPL-MCNC: 9.7 MG/DL (ref 8.6–10.2)
CHLORIDE BLD-SCNC: 101 MMOL/L (ref 98–107)
CLARITY: CLEAR
CO2: 27 MMOL/L (ref 22–29)
COLOR: YELLOW
CREAT SERPL-MCNC: 0.9 MG/DL (ref 0.7–1.2)
EOSINOPHILS ABSOLUTE: 0.29 E9/L (ref 0.05–0.5)
EOSINOPHILS RELATIVE PERCENT: 2.6 % (ref 0–6)
GFR AFRICAN AMERICAN: >60
GFR NON-AFRICAN AMERICAN: >60 ML/MIN/1.73
GLUCOSE BLD-MCNC: 150 MG/DL (ref 74–99)
GLUCOSE URINE: NEGATIVE MG/DL
HCT VFR BLD CALC: 51.2 % (ref 37–54)
HEMOGLOBIN: 17.1 G/DL (ref 12.5–16.5)
IMMATURE GRANULOCYTES #: 0.04 E9/L
IMMATURE GRANULOCYTES %: 0.4 % (ref 0–5)
KETONES, URINE: NEGATIVE MG/DL
LEUKOCYTE ESTERASE, URINE: NEGATIVE
LIPASE: 20 U/L (ref 13–60)
LYMPHOCYTES ABSOLUTE: 1.34 E9/L (ref 1.5–4)
LYMPHOCYTES RELATIVE PERCENT: 12.2 % (ref 20–42)
MCH RBC QN AUTO: 33.7 PG (ref 26–35)
MCHC RBC AUTO-ENTMCNC: 33.4 % (ref 32–34.5)
MCV RBC AUTO: 101 FL (ref 80–99.9)
MONOCYTES ABSOLUTE: 1.14 E9/L (ref 0.1–0.95)
MONOCYTES RELATIVE PERCENT: 10.4 % (ref 2–12)
NEUTROPHILS ABSOLUTE: 8.08 E9/L (ref 1.8–7.3)
NEUTROPHILS RELATIVE PERCENT: 73.9 % (ref 43–80)
NITRITE, URINE: NEGATIVE
PDW BLD-RTO: 13.2 FL (ref 11.5–15)
PH UA: 7 (ref 5–9)
PLATELET # BLD: 256 E9/L (ref 130–450)
PMV BLD AUTO: 10.9 FL (ref 7–12)
POTASSIUM REFLEX MAGNESIUM: 4 MMOL/L (ref 3.5–5)
PROTEIN UA: NEGATIVE MG/DL
RBC # BLD: 5.07 E12/L (ref 3.8–5.8)
SODIUM BLD-SCNC: 137 MMOL/L (ref 132–146)
SPECIFIC GRAVITY UA: 1.01 (ref 1–1.03)
TOTAL PROTEIN: 8.3 G/DL (ref 6.4–8.3)
UROBILINOGEN, URINE: 0.2 E.U./DL
WBC # BLD: 11 E9/L (ref 4.5–11.5)

## 2020-06-23 PROCEDURE — 96375 TX/PRO/DX INJ NEW DRUG ADDON: CPT

## 2020-06-23 PROCEDURE — 81003 URINALYSIS AUTO W/O SCOPE: CPT

## 2020-06-23 PROCEDURE — 2500000003 HC RX 250 WO HCPCS: Performed by: INTERNAL MEDICINE

## 2020-06-23 PROCEDURE — 96366 THER/PROPH/DIAG IV INF ADDON: CPT

## 2020-06-23 PROCEDURE — 99285 EMERGENCY DEPT VISIT HI MDM: CPT

## 2020-06-23 PROCEDURE — 2500000003 HC RX 250 WO HCPCS: Performed by: STUDENT IN AN ORGANIZED HEALTH CARE EDUCATION/TRAINING PROGRAM

## 2020-06-23 PROCEDURE — G0378 HOSPITAL OBSERVATION PER HR: HCPCS

## 2020-06-23 PROCEDURE — 6360000002 HC RX W HCPCS: Performed by: STUDENT IN AN ORGANIZED HEALTH CARE EDUCATION/TRAINING PROGRAM

## 2020-06-23 PROCEDURE — 96365 THER/PROPH/DIAG IV INF INIT: CPT

## 2020-06-23 PROCEDURE — 6360000004 HC RX CONTRAST MEDICATION: Performed by: RADIOLOGY

## 2020-06-23 PROCEDURE — 6370000000 HC RX 637 (ALT 250 FOR IP): Performed by: INTERNAL MEDICINE

## 2020-06-23 PROCEDURE — 2580000003 HC RX 258: Performed by: RADIOLOGY

## 2020-06-23 PROCEDURE — 85025 COMPLETE CBC W/AUTO DIFF WBC: CPT

## 2020-06-23 PROCEDURE — 2580000003 HC RX 258: Performed by: INTERNAL MEDICINE

## 2020-06-23 PROCEDURE — 1200000000 HC SEMI PRIVATE

## 2020-06-23 PROCEDURE — 2580000003 HC RX 258: Performed by: STUDENT IN AN ORGANIZED HEALTH CARE EDUCATION/TRAINING PROGRAM

## 2020-06-23 PROCEDURE — 80053 COMPREHEN METABOLIC PANEL: CPT

## 2020-06-23 PROCEDURE — 6360000002 HC RX W HCPCS: Performed by: INTERNAL MEDICINE

## 2020-06-23 PROCEDURE — 83690 ASSAY OF LIPASE: CPT

## 2020-06-23 PROCEDURE — 96376 TX/PRO/DX INJ SAME DRUG ADON: CPT

## 2020-06-23 PROCEDURE — 94760 N-INVAS EAR/PLS OXIMETRY 1: CPT

## 2020-06-23 PROCEDURE — 74177 CT ABD & PELVIS W/CONTRAST: CPT

## 2020-06-23 RX ORDER — FENTANYL CITRATE 50 UG/ML
50 INJECTION, SOLUTION INTRAMUSCULAR; INTRAVENOUS ONCE
Status: COMPLETED | OUTPATIENT
Start: 2020-06-23 | End: 2020-06-23

## 2020-06-23 RX ORDER — LEVETIRACETAM 500 MG/1
500 TABLET ORAL 2 TIMES DAILY
Status: DISCONTINUED | OUTPATIENT
Start: 2020-06-23 | End: 2020-06-25 | Stop reason: HOSPADM

## 2020-06-23 RX ORDER — POTASSIUM CHLORIDE 7.45 MG/ML
10 INJECTION INTRAVENOUS PRN
Status: DISCONTINUED | OUTPATIENT
Start: 2020-06-23 | End: 2020-06-25 | Stop reason: HOSPADM

## 2020-06-23 RX ORDER — ASPIRIN 81 MG/1
81 TABLET, CHEWABLE ORAL DAILY
Status: DISCONTINUED | OUTPATIENT
Start: 2020-06-24 | End: 2020-06-25 | Stop reason: HOSPADM

## 2020-06-23 RX ORDER — SODIUM CHLORIDE 0.9 % (FLUSH) 0.9 %
10 SYRINGE (ML) INJECTION PRN
Status: DISCONTINUED | OUTPATIENT
Start: 2020-06-23 | End: 2020-06-25 | Stop reason: HOSPADM

## 2020-06-23 RX ORDER — SODIUM CHLORIDE 0.9 % (FLUSH) 0.9 %
10 SYRINGE (ML) INJECTION EVERY 12 HOURS SCHEDULED
Status: DISCONTINUED | OUTPATIENT
Start: 2020-06-23 | End: 2020-06-25 | Stop reason: HOSPADM

## 2020-06-23 RX ORDER — PROMETHAZINE HYDROCHLORIDE 25 MG/1
12.5 TABLET ORAL EVERY 6 HOURS PRN
Status: DISCONTINUED | OUTPATIENT
Start: 2020-06-23 | End: 2020-06-25 | Stop reason: HOSPADM

## 2020-06-23 RX ORDER — POTASSIUM CHLORIDE 20 MEQ/1
40 TABLET, EXTENDED RELEASE ORAL PRN
Status: DISCONTINUED | OUTPATIENT
Start: 2020-06-23 | End: 2020-06-25 | Stop reason: HOSPADM

## 2020-06-23 RX ORDER — SODIUM CHLORIDE 9 MG/ML
INJECTION, SOLUTION INTRAVENOUS CONTINUOUS
Status: DISCONTINUED | OUTPATIENT
Start: 2020-06-23 | End: 2020-06-25

## 2020-06-23 RX ORDER — METOPROLOL SUCCINATE 25 MG/1
25 TABLET, EXTENDED RELEASE ORAL
Status: DISCONTINUED | OUTPATIENT
Start: 2020-06-23 | End: 2020-06-25 | Stop reason: HOSPADM

## 2020-06-23 RX ORDER — MORPHINE SULFATE 2 MG/ML
2 INJECTION, SOLUTION INTRAMUSCULAR; INTRAVENOUS EVERY 4 HOURS PRN
Status: DISCONTINUED | OUTPATIENT
Start: 2020-06-23 | End: 2020-06-25

## 2020-06-23 RX ORDER — OXYCODONE HYDROCHLORIDE AND ACETAMINOPHEN 5; 325 MG/1; MG/1
1 TABLET ORAL EVERY 4 HOURS PRN
Status: DISCONTINUED | OUTPATIENT
Start: 2020-06-23 | End: 2020-06-25 | Stop reason: HOSPADM

## 2020-06-23 RX ORDER — PRAVASTATIN SODIUM 20 MG
10 TABLET ORAL
Status: DISCONTINUED | OUTPATIENT
Start: 2020-06-23 | End: 2020-06-25 | Stop reason: HOSPADM

## 2020-06-23 RX ORDER — ONDANSETRON 2 MG/ML
4 INJECTION INTRAMUSCULAR; INTRAVENOUS EVERY 6 HOURS PRN
Status: DISCONTINUED | OUTPATIENT
Start: 2020-06-23 | End: 2020-06-25 | Stop reason: HOSPADM

## 2020-06-23 RX ORDER — SENNA PLUS 8.6 MG/1
1 TABLET ORAL DAILY PRN
Status: DISCONTINUED | OUTPATIENT
Start: 2020-06-23 | End: 2020-06-25 | Stop reason: HOSPADM

## 2020-06-23 RX ORDER — FAMOTIDINE 20 MG/1
20 TABLET, FILM COATED ORAL 2 TIMES DAILY
Status: DISCONTINUED | OUTPATIENT
Start: 2020-06-23 | End: 2020-06-25 | Stop reason: HOSPADM

## 2020-06-23 RX ORDER — ACETAMINOPHEN 650 MG/1
650 SUPPOSITORY RECTAL EVERY 6 HOURS PRN
Status: DISCONTINUED | OUTPATIENT
Start: 2020-06-23 | End: 2020-06-25 | Stop reason: HOSPADM

## 2020-06-23 RX ORDER — ACETAMINOPHEN 325 MG/1
650 TABLET ORAL EVERY 6 HOURS PRN
Status: DISCONTINUED | OUTPATIENT
Start: 2020-06-23 | End: 2020-06-25 | Stop reason: HOSPADM

## 2020-06-23 RX ADMIN — MORPHINE SULFATE 2 MG: 2 INJECTION, SOLUTION INTRAMUSCULAR; INTRAVENOUS at 19:43

## 2020-06-23 RX ADMIN — METOPROLOL SUCCINATE 25 MG: 25 TABLET, EXTENDED RELEASE ORAL at 18:18

## 2020-06-23 RX ADMIN — METRONIDAZOLE 500 MG: 500 INJECTION, SOLUTION INTRAVENOUS at 11:57

## 2020-06-23 RX ADMIN — SODIUM CHLORIDE: 9 INJECTION, SOLUTION INTRAVENOUS at 15:14

## 2020-06-23 RX ADMIN — OXYCODONE HYDROCHLORIDE AND ACETAMINOPHEN 1 TABLET: 5; 325 TABLET ORAL at 20:34

## 2020-06-23 RX ADMIN — IOPAMIDOL 110 ML: 755 INJECTION, SOLUTION INTRAVENOUS at 10:48

## 2020-06-23 RX ADMIN — MORPHINE SULFATE 2 MG: 2 INJECTION, SOLUTION INTRAMUSCULAR; INTRAVENOUS at 15:14

## 2020-06-23 RX ADMIN — METRONIDAZOLE 500 MG: 500 INJECTION, SOLUTION INTRAVENOUS at 20:26

## 2020-06-23 RX ADMIN — PRAVASTATIN SODIUM 10 MG: 20 TABLET ORAL at 18:19

## 2020-06-23 RX ADMIN — FAMOTIDINE 20 MG: 20 TABLET, FILM COATED ORAL at 20:26

## 2020-06-23 RX ADMIN — LEVETIRACETAM 500 MG: 500 TABLET ORAL at 20:33

## 2020-06-23 RX ADMIN — Medication 10 ML: at 10:48

## 2020-06-23 RX ADMIN — WATER 1 G: 1 INJECTION INTRAMUSCULAR; INTRAVENOUS; SUBCUTANEOUS at 11:57

## 2020-06-23 RX ADMIN — OXYCODONE HYDROCHLORIDE AND ACETAMINOPHEN 1 TABLET: 5; 325 TABLET ORAL at 16:34

## 2020-06-23 RX ADMIN — FENTANYL CITRATE 50 MCG: 50 INJECTION INTRAMUSCULAR; INTRAVENOUS at 11:03

## 2020-06-23 RX ADMIN — SODIUM CHLORIDE, PRESERVATIVE FREE 10 ML: 5 INJECTION INTRAVENOUS at 19:43

## 2020-06-23 ASSESSMENT — ENCOUNTER SYMPTOMS
ABDOMINAL DISTENTION: 0
NAUSEA: 1
VOICE CHANGE: 0
STRIDOR: 0
CHOKING: 0
SINUS PRESSURE: 0
APNEA: 0
VOMITING: 1
EYE REDNESS: 0
EYE PAIN: 0
CHEST TIGHTNESS: 0
EYE DISCHARGE: 0
BLOOD IN STOOL: 0
TROUBLE SWALLOWING: 0
WHEEZING: 0
SORE THROAT: 0
PHOTOPHOBIA: 0
RECTAL PAIN: 0
BACK PAIN: 0
COLOR CHANGE: 0
ABDOMINAL PAIN: 1
COUGH: 0
DIARRHEA: 0
SHORTNESS OF BREATH: 0

## 2020-06-23 ASSESSMENT — PAIN DESCRIPTION - ONSET: ONSET: ON-GOING

## 2020-06-23 ASSESSMENT — PAIN SCALES - GENERAL
PAINLEVEL_OUTOF10: 6
PAINLEVEL_OUTOF10: 8
PAINLEVEL_OUTOF10: 7
PAINLEVEL_OUTOF10: 9
PAINLEVEL_OUTOF10: 7
PAINLEVEL_OUTOF10: 7
PAINLEVEL_OUTOF10: 8
PAINLEVEL_OUTOF10: 8
PAINLEVEL_OUTOF10: 6

## 2020-06-23 ASSESSMENT — PAIN DESCRIPTION - PROGRESSION: CLINICAL_PROGRESSION: GRADUALLY IMPROVING

## 2020-06-23 ASSESSMENT — PAIN DESCRIPTION - ORIENTATION
ORIENTATION: RIGHT;LOWER;MID
ORIENTATION: RIGHT;LOWER;MID

## 2020-06-23 ASSESSMENT — PAIN DESCRIPTION - LOCATION
LOCATION: ABDOMEN

## 2020-06-23 ASSESSMENT — PAIN DESCRIPTION - FREQUENCY: FREQUENCY: CONTINUOUS

## 2020-06-23 ASSESSMENT — PAIN DESCRIPTION - PAIN TYPE
TYPE: ACUTE PAIN

## 2020-06-23 ASSESSMENT — PAIN DESCRIPTION - DESCRIPTORS
DESCRIPTORS: THROBBING;SHOOTING;SHARP
DESCRIPTORS: ACHING;DISCOMFORT;THROBBING

## 2020-06-23 NOTE — CONSULTS
GENERAL SURGERY  CONSULT NOTE  6/23/2020    Physician Consulted: Dr. Cara Sarabia  Reason for Consult: diverticulitis  Referring Physician: Dr. Siobhan Silverman    Chief Complaint   Patient presents with    Abdominal Pain     lower abdominal pain x 2 days; this morning pain is located only in the RLQ; denies nausea/vomiting     Constipation    Urinary Frequency     HPI  Georges Mean is a 72 y.o. male with PMH including CAD s/p CABG on ASA who presents for evaluation of abdominal pain. 2 or 3 days ago it started in his low abdomen, and yesterday went to his low RLQ. Has not had pain like this before. Had a fever to 99.7, no chills. Passing gas but with some difficulty, last BM was yesterday or the day before. No prior abdominal surgeries. EGD 2011 done with concern for foreign body showed reflux and active esophagitis, gastric ulcers. Colonoscopy 2012 with diverticulosis.      Past Medical History:   Diagnosis Date    Basal cell carcinoma     CAD (coronary artery disease) 11/2019    Cardiomegaly 05/17/2016    Chronic back pain     Chronic back pain     CPAP (continuous positive airway pressure) dependence     Erectile dysfunction     Erectile dysfunction     Esophagitis 09/2011    VITO    H/O cardiomegaly     NORMAL ECHO IN 2016    H/O hypogonadism 2012    History of basal cell carcinoma     History of pneumonia 2016    History of sebaceous cyst     Hyperlipidemia     Hypertension     Hypogonadism in male 12/18/2012    Lumbar radiculopathy 08/17/2015    Obesity     Obesity     Osteoarthritis     Postoperative atrial fibrillation ([address] 12/24/2019    Sleep apnea        Past Surgical History:   Procedure Laterality Date    CARDIAC CATHETERIZATION  12/10/2019    scavina  CT surgery consult    CORONARY ARTERY BYPASS GRAFT N/A 12/23/2019    CABG CORONARY ARTERY BYPASS, SHANTELL performed by George Samano MD at [address]  2005    [address] [place] [address] ESOPHGOSCOPY  9/10/2011    foreign body removal    JOINT REPLACEMENT Bilateral 2004    knees    KNEE SURGERY  2004    bilateral knee replacements    SHOULDER SURGERY  1997    right rotator cuff    SHOULDER SURGERY Right 1997    SKIN CANCER EXCISION  2003    CUDDAPAH    VASECTOMY      AGE 27       Medications Prior to Admission:    Prior to Admission medications    Medication Sig Start Date End Date Taking? Authorizing Provider   oxyCODONE-acetaminophen (PERCOCET) 5-325 MG per tablet Take 1 tablet by mouth 2 times daily as needed for Pain for up to 30 days. Intended supply: 3 days. Take lowest dose possible to manage pain 20 Yes Clementine Bautista MD   pravastatin (PRAVACHOL) 10 MG tablet Take 1 tablet by mouth nightly  Patient taking differently: Take 10 mg by mouth Daily with supper  20  Yes Carter Brown MD   levETIRAcetam (KEPPRA) 500 MG tablet Take 1 tablet by mouth 2 times daily 20  Yes Carter Brown MD   vitamin C (ASCORBIC ACID) 500 MG tablet Take 500 mg by mouth daily    Yes Historical Provider, MD   metoprolol succinate (TOPROL XL) 25 MG extended release tablet Take 1 tablet by mouth 2 times daily  Patient taking differently: Take 25 mg by mouth Daily with supper  19  Yes Marta Telles MD   aspirin 81 MG chewable tablet Take 1 tablet by mouth daily 19  Yes Carter Brown MD       Allergies   Allergen Reactions    Statins Other (See Comments)     Myalgias        Family History   Problem Relation Age of Onset    Cancer Mother     Stroke Father     Stroke Sister     No Known Problems Brother     Stroke Sister     No Known Problems Sister     No Known Problems Sister     No Known Problems Sister        Social History     Tobacco Use    Smoking status: Former Smoker     Packs/day: 1.50     Types: Cigarettes     Start date: 1966     Last attempt to quit:      Years since quittin.4    Smokeless tobacco: Never Used   Substance Use Topics    Alcohol use:  Yes     Alcohol/week: 14.0 standard drinks     Types: 14 Cans

## 2020-06-23 NOTE — ED PROVIDER NOTES
Coordination normal.   Psychiatric:         Mood and Affect: Mood normal.         Behavior: Behavior normal.          Procedures     MDM  Number of Diagnoses or Management Options  Sigmoid diverticulitis:   Diagnosis management comments: 60-year-old male presents with acute right lower quadrant abdominal pain for 2 days. Patient be evaluated for but not limited to appendicitis, diverticulitis, colitis, and pancreatitis. Physical exam patient has tenderness to McBurney's point. Positive Rovsing sign. CT abdomen pelvis was ordered. Diverticulitis of his right lower quadrant. Patient started on IV Rocephin and Flagyl. Surgery was consulted and evaluated patient at bedside. They recommend the patient be admitted to medicine. Dr. Samantha Daniel will admit patient. Patient has been placed n.p.o., given IV fluids, analgesic medication, and IV antibiotics. ED Course as of Jun 23 1715   Tue Jun 23, 2020   1127 Significant inflammatory changes in the right lower quadrant with the  epicenter probably in the sigmoid colon from sigmoid diverticulitis. The inflammation extending to the tip of the appendix resulting in  secondary appendicitis.     [JV]   1133 A linear lucency in the spleen which may represent splenic infarct  versus ruptured    [JV]      ED Course User Index  [JV] Carrillo Molina MD       --------------------------------------------- PAST HISTORY ---------------------------------------------  Past Medical History:  has a past medical history of Basal cell carcinoma, CAD (coronary artery disease), Cardiomegaly, Chronic back pain, Chronic back pain, CPAP (continuous positive airway pressure) dependence, Erectile dysfunction, Erectile dysfunction, Esophagitis, H/O cardiomegaly, H/O hypogonadism, History of basal cell carcinoma, History of pneumonia, History of sebaceous cyst, Hyperlipidemia, Hypertension, Hypogonadism in male, Lumbar radiculopathy, Obesity, Obesity, Osteoarthritis, Postoperative atrial ABNORMAL REPORT                   ------------------------- NURSING NOTES AND VITALS REVIEWED ---------------------------  Date / Time Roomed:  6/23/2020  9:04 AM  ED Bed Assignment:  [id number]    The nursing notes within the ED encounter and vital signs as below have been reviewed. Patient Vitals for the past 24 hrs:   BP Temp Temp src Pulse Resp SpO2 Height Weight   06/23/20 1610 -- -- -- -- -- 97 % -- --   06/23/20 1451 (!) 150/82 97.5 °F (36.4 °C) Oral 62 16 -- -- --   06/23/20 1301 (!) 140/90 98.1 °F (36.7 °C) Oral 60 18 97 % -- --   06/23/20 1106 (!) 154/89 -- -- 64 18 99 % -- --   06/23/20 0847 (!) 168/97 -- -- -- -- -- -- --   06/23/20 0844 -- 97.3 °F (36.3 °C) Infrared 75 16 98 % 6' (1.829 m) 257 lb (116.6 kg)       Oxygen Saturation Interpretation: Normal    ------------------------------------------ PROGRESS NOTES ------------------------------------------  Re-evaluation(s):  Time: 9180  Patients symptoms are improving  Repeat physical examination is improved    Counseling:  I have spoken with the patient and discussed todays results, in addition to providing specific details for the plan of care and counseling regarding the diagnosis and prognosis. Their questions are answered at this time and they are agreeable with the plan of admission.    --------------------------------- ADDITIONAL PROVIDER NOTES ---------------------------------  Consultations:  Spoke with Dr. Gerson Greenfield. Discussed case. They will admit the patient. This patient's ED course included: a personal history and physicial examination, re-evaluation prior to disposition, multiple bedside re-evaluations, IV medications, cardiac monitoring and continuous pulse oximetry    This patient has remained hemodynamically stable during their ED course. Diagnosis:  1. Sigmoid diverticulitis        Disposition:  Patient's disposition: Admit to med/surg floor  Patient's condition is good.          Tyler Landon MD  Resident  06/23/20

## 2020-06-23 NOTE — H&P
Internal Medicine History & Physical     Name: Art Lindsey  : 1950  Chief Complaint: Abdominal Pain (lower abdominal pain x 2 days; this morning pain is located only in the RLQ; denies nausea/vomiting ); Constipation; and Urinary Frequency  Primary Care Physician: En Denton MD  Admission date: 2020  Date of service: 2020     History of Present Illness  Luz Washington is a 72y.o. year old male. He presents to the hospital with a chief complaint of abdominal pain. He states that on  at 1 AM he developed lower abdominal pain. He got worse. It seemed to settle on the right side of his lower abdomen. On Monday and Tuesday he was having a lot of pain and for this reason came to the emergency department yesterday. Nothing particular seems to make his symptoms better or worse. He denies any other associated symptoms. His last colonoscopy was about 2 to 3 years ago. It was normal at that time. His symptoms are moderate in severity. It is important to note that the patient has a lot of medical issues recently including a seizure on  for which she was treated at [place] [address].  He also had a coronary artery bypass grafting in 2019. There were no family or friends present at bedside. History is provided to the patient. He is felt to be a good historian. ED course:   Initial blood work and imaging studies performed. Admission recommended by ED physician. Case discussed with ED provider.  Meds in ED consisted of the following:  Medications   sodium chloride flush 0.9 % injection 10 mL (10 mLs Intravenous Given 20 1048)   iopamidol (ISOVUE-370) 76 % injection 110 mL (110 mLs Intravenous Given 20 1048)   fentaNYL (SUBLIMAZE) injection 50 mcg (50 mcg Intravenous Given 20 1103)   cefTRIAXone (ROCEPHIN) 1 g in sterile water 10 mL IV syringe (0 g Intravenous Stopped 20 1201)   metronidazole (FLAGYL) 500 mg in NaCl 100 mL IVPB premix (0 mg Intravenous Stopped 6/23/20 1301)       Past Medical History:   Diagnosis Date    Basal cell carcinoma     CAD (coronary artery disease) 11/2019    Cardiomegaly 05/17/2016    Chronic back pain     Chronic back pain     CPAP (continuous positive airway pressure) dependence     Erectile dysfunction     Erectile dysfunction     Esophagitis 09/2011    VITO    H/O cardiomegaly     NORMAL ECHO IN 2016    H/O hypogonadism 2012    History of basal cell carcinoma     History of pneumonia 2016    History of sebaceous cyst     Hyperlipidemia     Hypertension     Hypogonadism in male 12/18/2012    Lumbar radiculopathy 08/17/2015    Obesity     Obesity     Osteoarthritis     Postoperative atrial fibrillation ([address] 12/24/2019    Sleep apnea        Past Surgical History:   Procedure Laterality Date    CARDIAC CATHETERIZATION  12/10/2019    scavina  CT surgery consult    CORONARY ARTERY BYPASS GRAFT N/A 12/23/2019    CABG CORONARY ARTERY BYPASS, SHANTELL performed by Winston Almaguer MD at [address] [place] [address] ESOPHGOSCOPY  9/10/2011    foreign body removal    JOINT REPLACEMENT Bilateral 2004    knees    KNEE SURGERY  2004    bilateral knee replacements    SHOULDER SURGERY  1997    right rotator cuff    SHOULDER SURGERY Right 1997    SKIN CANCER EXCISION  2003    CUDDAPAH    VASECTOMY      AGE 33       Family History   Problem Relation Age of Onset    Cancer Mother     Stroke Father     Stroke Sister     No Known Problems Brother     Stroke Sister     No Known Problems Sister     No Known Problems Sister     No Known Problems Sister        No pertinent family medical history with regard to current issues. Social History  Patient lives at home  W/ his wife. Employment:   Illicit drug use- denies  TOBACCO:   reports that he quit smoking about 23 years ago. His smoking use included cigarettes. He started smoking about 54 years ago. He smoked 1.50 packs per day.  He 06/24/2020    CREATININE 0.8 06/24/2020    BUN 17 06/24/2020    CO2 21 (L) 06/24/2020    GLUCOSE 82 06/24/2020    ALT 19 06/24/2020    AST 24 06/24/2020    INR 1.2 12/23/2019     Lab Results   Component Value Date    TROPONINI < 0.03 05/29/2020       Recent Radiological Studies:  CT ABDOMEN PELVIS W IV CONTRAST Additional Contrast? None   Final Result   Significant inflammatory changes in the right lower quadrant with the   epicenter probably in the sigmoid colon from sigmoid diverticulitis. The inflammation extending to the tip of the appendix resulting in   secondary appendicitis. Primary appendicitis is less likely. Surgical   assessment colonoscopy when feasible, is recommended. A linear lucency in the spleen which may represent splenic infarct   versus ruptured. Focal saccular aneurysm of the abdominal aorta. Consider surveillance. The above findings were telephoned to the ED physician.       ALERT:  THIS IS AN ABNORMAL REPORT                 Assessment  Active Hospital Problems    Diagnosis    Acute diverticulitis [K57.92]     Priority: High    CPAP (continuous positive airway pressure) dependence [Z99.89]    Chronic back pain [M54.9, G89.29]    Hx of lumbosacral spine surgery [Z98.890]    Chronic fatigue [R53.82]    CAD (coronary artery disease) [I25.10]    Sleep apnea [G47.30]    Hypertension [I10]    Primary osteoarthritis involving multiple joints [M15.0]    Hyperlipidemia [E78.5]    Obesity [E66.9]    Cardiomegaly [I51.7]       Patient Active Problem List    Diagnosis Date Noted    Acute diverticulitis 06/23/2020     Priority: High    CPAP (continuous positive airway pressure) dependence     Chronic back pain     Subarachnoid hemorrhage ([address] 05/29/2020    Seizure as late effect of cerebrovascular accident (CVA) ([address] 05/29/2020    Hx of lumbosacral spine surgery 05/11/2020    Elevated PSA 04/02/2020    Chronic fatigue 03/11/2020    Iron deficiency anemia due to

## 2020-06-24 LAB
ALBUMIN SERPL-MCNC: 3.6 G/DL (ref 3.5–5.2)
ALP BLD-CCNC: 92 U/L (ref 40–129)
ALT SERPL-CCNC: 19 U/L (ref 0–40)
ANION GAP SERPL CALCULATED.3IONS-SCNC: 14 MMOL/L (ref 7–16)
AST SERPL-CCNC: 24 U/L (ref 0–39)
BASOPHILS ABSOLUTE: 0.05 E9/L (ref 0–0.2)
BASOPHILS RELATIVE PERCENT: 0.5 % (ref 0–2)
BILIRUB SERPL-MCNC: 0.7 MG/DL (ref 0–1.2)
BUN BLDV-MCNC: 17 MG/DL (ref 8–23)
CALCIUM SERPL-MCNC: 9.1 MG/DL (ref 8.6–10.2)
CHLORIDE BLD-SCNC: 102 MMOL/L (ref 98–107)
CO2: 21 MMOL/L (ref 22–29)
CREAT SERPL-MCNC: 0.8 MG/DL (ref 0.7–1.2)
EOSINOPHILS ABSOLUTE: 0.31 E9/L (ref 0.05–0.5)
EOSINOPHILS RELATIVE PERCENT: 3 % (ref 0–6)
GFR AFRICAN AMERICAN: >60
GFR NON-AFRICAN AMERICAN: >60 ML/MIN/1.73
GLUCOSE BLD-MCNC: 82 MG/DL (ref 74–99)
HCT VFR BLD CALC: 47.7 % (ref 37–54)
HEMOGLOBIN: 15.9 G/DL (ref 12.5–16.5)
IMMATURE GRANULOCYTES #: 0.05 E9/L
IMMATURE GRANULOCYTES %: 0.5 % (ref 0–5)
LYMPHOCYTES ABSOLUTE: 1.34 E9/L (ref 1.5–4)
LYMPHOCYTES RELATIVE PERCENT: 13 % (ref 20–42)
MCH RBC QN AUTO: 33.9 PG (ref 26–35)
MCHC RBC AUTO-ENTMCNC: 33.3 % (ref 32–34.5)
MCV RBC AUTO: 101.7 FL (ref 80–99.9)
MONOCYTES ABSOLUTE: 1.12 E9/L (ref 0.1–0.95)
MONOCYTES RELATIVE PERCENT: 10.8 % (ref 2–12)
NEUTROPHILS ABSOLUTE: 7.46 E9/L (ref 1.8–7.3)
NEUTROPHILS RELATIVE PERCENT: 72.2 % (ref 43–80)
PDW BLD-RTO: 13.2 FL (ref 11.5–15)
PLATELET # BLD: 238 E9/L (ref 130–450)
PMV BLD AUTO: 11.3 FL (ref 7–12)
POTASSIUM REFLEX MAGNESIUM: 3.9 MMOL/L (ref 3.5–5)
RBC # BLD: 4.69 E12/L (ref 3.8–5.8)
SODIUM BLD-SCNC: 137 MMOL/L (ref 132–146)
TOTAL PROTEIN: 7.6 G/DL (ref 6.4–8.3)
WBC # BLD: 10.3 E9/L (ref 4.5–11.5)

## 2020-06-24 PROCEDURE — 6360000002 HC RX W HCPCS: Performed by: INTERNAL MEDICINE

## 2020-06-24 PROCEDURE — 96366 THER/PROPH/DIAG IV INF ADDON: CPT

## 2020-06-24 PROCEDURE — 80053 COMPREHEN METABOLIC PANEL: CPT

## 2020-06-24 PROCEDURE — 2500000003 HC RX 250 WO HCPCS: Performed by: INTERNAL MEDICINE

## 2020-06-24 PROCEDURE — 1200000000 HC SEMI PRIVATE

## 2020-06-24 PROCEDURE — 96375 TX/PRO/DX INJ NEW DRUG ADDON: CPT

## 2020-06-24 PROCEDURE — 96376 TX/PRO/DX INJ SAME DRUG ADON: CPT

## 2020-06-24 PROCEDURE — 36415 COLL VENOUS BLD VENIPUNCTURE: CPT

## 2020-06-24 PROCEDURE — 6370000000 HC RX 637 (ALT 250 FOR IP): Performed by: INTERNAL MEDICINE

## 2020-06-24 PROCEDURE — G0378 HOSPITAL OBSERVATION PER HR: HCPCS

## 2020-06-24 PROCEDURE — 85025 COMPLETE CBC W/AUTO DIFF WBC: CPT

## 2020-06-24 PROCEDURE — 2580000003 HC RX 258: Performed by: INTERNAL MEDICINE

## 2020-06-24 RX ADMIN — LEVETIRACETAM 500 MG: 500 TABLET ORAL at 20:25

## 2020-06-24 RX ADMIN — OXYCODONE HYDROCHLORIDE AND ACETAMINOPHEN 1 TABLET: 5; 325 TABLET ORAL at 00:34

## 2020-06-24 RX ADMIN — ONDANSETRON 4 MG: 2 INJECTION INTRAMUSCULAR; INTRAVENOUS at 07:52

## 2020-06-24 RX ADMIN — OXYCODONE HYDROCHLORIDE AND ACETAMINOPHEN 1 TABLET: 5; 325 TABLET ORAL at 20:25

## 2020-06-24 RX ADMIN — SODIUM CHLORIDE: 9 INJECTION, SOLUTION INTRAVENOUS at 05:49

## 2020-06-24 RX ADMIN — LEVETIRACETAM 500 MG: 500 TABLET ORAL at 07:56

## 2020-06-24 RX ADMIN — ASPIRIN 81 MG 81 MG: 81 TABLET ORAL at 07:52

## 2020-06-24 RX ADMIN — METRONIDAZOLE 500 MG: 500 INJECTION, SOLUTION INTRAVENOUS at 04:12

## 2020-06-24 RX ADMIN — FAMOTIDINE 20 MG: 20 TABLET, FILM COATED ORAL at 20:25

## 2020-06-24 RX ADMIN — METRONIDAZOLE 500 MG: 500 INJECTION, SOLUTION INTRAVENOUS at 20:25

## 2020-06-24 RX ADMIN — WATER 1 G: 1 INJECTION INTRAMUSCULAR; INTRAVENOUS; SUBCUTANEOUS at 13:01

## 2020-06-24 RX ADMIN — OXYCODONE HYDROCHLORIDE AND ACETAMINOPHEN 1 TABLET: 5; 325 TABLET ORAL at 16:08

## 2020-06-24 RX ADMIN — MORPHINE SULFATE 2 MG: 2 INJECTION, SOLUTION INTRAMUSCULAR; INTRAVENOUS at 01:50

## 2020-06-24 RX ADMIN — OXYCODONE HYDROCHLORIDE AND ACETAMINOPHEN 1 TABLET: 5; 325 TABLET ORAL at 05:49

## 2020-06-24 RX ADMIN — METRONIDAZOLE 500 MG: 500 INJECTION, SOLUTION INTRAVENOUS at 11:35

## 2020-06-24 RX ADMIN — OXYCODONE HYDROCHLORIDE AND ACETAMINOPHEN 1 TABLET: 5; 325 TABLET ORAL at 10:43

## 2020-06-24 RX ADMIN — FAMOTIDINE 20 MG: 20 TABLET, FILM COATED ORAL at 07:52

## 2020-06-24 RX ADMIN — PRAVASTATIN SODIUM 10 MG: 20 TABLET ORAL at 17:01

## 2020-06-24 RX ADMIN — METOPROLOL SUCCINATE 25 MG: 25 TABLET, EXTENDED RELEASE ORAL at 17:01

## 2020-06-24 ASSESSMENT — PAIN DESCRIPTION - FREQUENCY
FREQUENCY: CONTINUOUS

## 2020-06-24 ASSESSMENT — PAIN DESCRIPTION - ORIENTATION
ORIENTATION: RIGHT;LEFT;LOWER
ORIENTATION: RIGHT;LEFT;LOWER
ORIENTATION: RIGHT;LEFT
ORIENTATION: RIGHT;LEFT
ORIENTATION: RIGHT;LEFT;LOWER
ORIENTATION: RIGHT;LEFT;LOWER
ORIENTATION: RIGHT;LEFT

## 2020-06-24 ASSESSMENT — PAIN DESCRIPTION - PROGRESSION
CLINICAL_PROGRESSION: GRADUALLY WORSENING
CLINICAL_PROGRESSION: GRADUALLY WORSENING
CLINICAL_PROGRESSION: GRADUALLY IMPROVING
CLINICAL_PROGRESSION: GRADUALLY WORSENING
CLINICAL_PROGRESSION: GRADUALLY IMPROVING
CLINICAL_PROGRESSION: NOT CHANGED
CLINICAL_PROGRESSION: GRADUALLY WORSENING

## 2020-06-24 ASSESSMENT — PAIN DESCRIPTION - ONSET
ONSET: ON-GOING

## 2020-06-24 ASSESSMENT — PAIN DESCRIPTION - DESCRIPTORS
DESCRIPTORS: ACHING;DISCOMFORT
DESCRIPTORS: ACHING;DISCOMFORT;DULL

## 2020-06-24 ASSESSMENT — PAIN DESCRIPTION - PAIN TYPE
TYPE: ACUTE PAIN

## 2020-06-24 ASSESSMENT — PAIN - FUNCTIONAL ASSESSMENT
PAIN_FUNCTIONAL_ASSESSMENT: ACTIVITIES ARE NOT PREVENTED

## 2020-06-24 ASSESSMENT — PAIN SCALES - GENERAL
PAINLEVEL_OUTOF10: 5
PAINLEVEL_OUTOF10: 8
PAINLEVEL_OUTOF10: 4
PAINLEVEL_OUTOF10: 6
PAINLEVEL_OUTOF10: 8
PAINLEVEL_OUTOF10: 7
PAINLEVEL_OUTOF10: 5
PAINLEVEL_OUTOF10: 6
PAINLEVEL_OUTOF10: 7
PAINLEVEL_OUTOF10: 5
PAINLEVEL_OUTOF10: 7
PAINLEVEL_OUTOF10: 8

## 2020-06-24 ASSESSMENT — PAIN DESCRIPTION - LOCATION
LOCATION: ABDOMEN

## 2020-06-24 NOTE — PROGRESS NOTES
[facility] Quality Flow/Interdisciplinary Rounds Progress Note        Quality Flow Rounds held on June 24, 2020    Disciplines Attending:  Bedside Nurse, ,  and Nursing Unit Leadership    Von Thayer was admitted on 6/23/2020  9:04 AM    Anticipated Discharge Date:  Expected Discharge Date: 06/26/20    Disposition:    Benjamín Score:  Benjamín Scale Score: 22    Readmission Risk              Risk of Unplanned Readmission:        12           Discussed patient goal for the day, patient clinical progression, and barriers to discharge.   The following Goal(s) of the Day/Commitment(s) have been identified:  Pain Control      Yue Burn  June 24, 2020

## 2020-06-25 VITALS
HEIGHT: 72 IN | WEIGHT: 257 LBS | TEMPERATURE: 98.8 F | OXYGEN SATURATION: 93 % | HEART RATE: 66 BPM | RESPIRATION RATE: 16 BRPM | BODY MASS INDEX: 34.81 KG/M2 | DIASTOLIC BLOOD PRESSURE: 88 MMHG | SYSTOLIC BLOOD PRESSURE: 136 MMHG

## 2020-06-25 LAB
ALBUMIN SERPL-MCNC: 3.5 G/DL (ref 3.5–5.2)
ALP BLD-CCNC: 82 U/L (ref 40–129)
ALT SERPL-CCNC: 13 U/L (ref 0–40)
ANION GAP SERPL CALCULATED.3IONS-SCNC: 12 MMOL/L (ref 7–16)
AST SERPL-CCNC: 20 U/L (ref 0–39)
BASOPHILS ABSOLUTE: 0.07 E9/L (ref 0–0.2)
BASOPHILS RELATIVE PERCENT: 0.8 % (ref 0–2)
BILIRUB SERPL-MCNC: 0.4 MG/DL (ref 0–1.2)
BUN BLDV-MCNC: 11 MG/DL (ref 8–23)
CALCIUM SERPL-MCNC: 8.9 MG/DL (ref 8.6–10.2)
CHLORIDE BLD-SCNC: 103 MMOL/L (ref 98–107)
CO2: 23 MMOL/L (ref 22–29)
CREAT SERPL-MCNC: 0.8 MG/DL (ref 0.7–1.2)
EOSINOPHILS ABSOLUTE: 0.47 E9/L (ref 0.05–0.5)
EOSINOPHILS RELATIVE PERCENT: 5.6 % (ref 0–6)
GFR AFRICAN AMERICAN: >60
GFR NON-AFRICAN AMERICAN: >60 ML/MIN/1.73
GLUCOSE BLD-MCNC: 100 MG/DL (ref 74–99)
HCT VFR BLD CALC: 44.5 % (ref 37–54)
HEMOGLOBIN: 14.8 G/DL (ref 12.5–16.5)
IMMATURE GRANULOCYTES #: 0.04 E9/L
IMMATURE GRANULOCYTES %: 0.5 % (ref 0–5)
LYMPHOCYTES ABSOLUTE: 1.58 E9/L (ref 1.5–4)
LYMPHOCYTES RELATIVE PERCENT: 18.9 % (ref 20–42)
MCH RBC QN AUTO: 33.6 PG (ref 26–35)
MCHC RBC AUTO-ENTMCNC: 33.3 % (ref 32–34.5)
MCV RBC AUTO: 101.1 FL (ref 80–99.9)
MONOCYTES ABSOLUTE: 0.97 E9/L (ref 0.1–0.95)
MONOCYTES RELATIVE PERCENT: 11.6 % (ref 2–12)
NEUTROPHILS ABSOLUTE: 5.23 E9/L (ref 1.8–7.3)
NEUTROPHILS RELATIVE PERCENT: 62.6 % (ref 43–80)
PDW BLD-RTO: 13 FL (ref 11.5–15)
PLATELET # BLD: 233 E9/L (ref 130–450)
PMV BLD AUTO: 10.7 FL (ref 7–12)
POTASSIUM REFLEX MAGNESIUM: 3.9 MMOL/L (ref 3.5–5)
RBC # BLD: 4.4 E12/L (ref 3.8–5.8)
SODIUM BLD-SCNC: 138 MMOL/L (ref 132–146)
TOTAL PROTEIN: 7 G/DL (ref 6.4–8.3)
WBC # BLD: 8.4 E9/L (ref 4.5–11.5)

## 2020-06-25 PROCEDURE — 2500000003 HC RX 250 WO HCPCS: Performed by: INTERNAL MEDICINE

## 2020-06-25 PROCEDURE — 80053 COMPREHEN METABOLIC PANEL: CPT

## 2020-06-25 PROCEDURE — 2580000003 HC RX 258: Performed by: INTERNAL MEDICINE

## 2020-06-25 PROCEDURE — 96366 THER/PROPH/DIAG IV INF ADDON: CPT

## 2020-06-25 PROCEDURE — 85025 COMPLETE CBC W/AUTO DIFF WBC: CPT

## 2020-06-25 PROCEDURE — 36415 COLL VENOUS BLD VENIPUNCTURE: CPT

## 2020-06-25 PROCEDURE — 6370000000 HC RX 637 (ALT 250 FOR IP): Performed by: INTERNAL MEDICINE

## 2020-06-25 PROCEDURE — G0378 HOSPITAL OBSERVATION PER HR: HCPCS

## 2020-06-25 RX ORDER — METRONIDAZOLE 500 MG/1
500 TABLET ORAL 3 TIMES DAILY
Qty: 36 TABLET | Refills: 0 | Status: SHIPPED | OUTPATIENT
Start: 2020-06-25 | End: 2020-07-07

## 2020-06-25 RX ORDER — CEFDINIR 300 MG/1
300 CAPSULE ORAL 2 TIMES DAILY
Qty: 20 CAPSULE | Refills: 0 | Status: SHIPPED | OUTPATIENT
Start: 2020-06-25 | End: 2020-07-05

## 2020-06-25 RX ADMIN — OXYCODONE HYDROCHLORIDE AND ACETAMINOPHEN 1 TABLET: 5; 325 TABLET ORAL at 09:09

## 2020-06-25 RX ADMIN — LEVETIRACETAM 500 MG: 500 TABLET ORAL at 08:36

## 2020-06-25 RX ADMIN — ASPIRIN 81 MG 81 MG: 81 TABLET ORAL at 08:36

## 2020-06-25 RX ADMIN — SODIUM CHLORIDE, PRESERVATIVE FREE 10 ML: 5 INJECTION INTRAVENOUS at 08:36

## 2020-06-25 RX ADMIN — METRONIDAZOLE 500 MG: 500 INJECTION, SOLUTION INTRAVENOUS at 03:50

## 2020-06-25 RX ADMIN — FAMOTIDINE 20 MG: 20 TABLET, FILM COATED ORAL at 08:36

## 2020-06-25 RX ADMIN — OXYCODONE HYDROCHLORIDE AND ACETAMINOPHEN 1 TABLET: 5; 325 TABLET ORAL at 05:01

## 2020-06-25 RX ADMIN — SODIUM CHLORIDE 100 ML/HR: 9 INJECTION, SOLUTION INTRAVENOUS at 03:49

## 2020-06-25 RX ADMIN — OXYCODONE HYDROCHLORIDE AND ACETAMINOPHEN 1 TABLET: 5; 325 TABLET ORAL at 00:34

## 2020-06-25 ASSESSMENT — PAIN DESCRIPTION - DESCRIPTORS
DESCRIPTORS: ACHING;DISCOMFORT
DESCRIPTORS: ACHING;DISCOMFORT;DULL
DESCRIPTORS: ACHING;DISCOMFORT

## 2020-06-25 ASSESSMENT — PAIN DESCRIPTION - PAIN TYPE
TYPE: ACUTE PAIN

## 2020-06-25 ASSESSMENT — PAIN SCALES - GENERAL
PAINLEVEL_OUTOF10: 0
PAINLEVEL_OUTOF10: 4
PAINLEVEL_OUTOF10: 0
PAINLEVEL_OUTOF10: 6
PAINLEVEL_OUTOF10: 7
PAINLEVEL_OUTOF10: 0
PAINLEVEL_OUTOF10: 7

## 2020-06-25 ASSESSMENT — PAIN DESCRIPTION - PROGRESSION
CLINICAL_PROGRESSION: GRADUALLY WORSENING
CLINICAL_PROGRESSION: GRADUALLY WORSENING

## 2020-06-25 ASSESSMENT — PAIN DESCRIPTION - ORIENTATION
ORIENTATION: RIGHT;LEFT
ORIENTATION: RIGHT;LEFT;LOWER
ORIENTATION: RIGHT;LOWER

## 2020-06-25 ASSESSMENT — PAIN DESCRIPTION - FREQUENCY
FREQUENCY: CONTINUOUS
FREQUENCY: CONTINUOUS

## 2020-06-25 ASSESSMENT — PAIN - FUNCTIONAL ASSESSMENT
PAIN_FUNCTIONAL_ASSESSMENT: ACTIVITIES ARE NOT PREVENTED
PAIN_FUNCTIONAL_ASSESSMENT: ACTIVITIES ARE NOT PREVENTED

## 2020-06-25 ASSESSMENT — PAIN DESCRIPTION - LOCATION
LOCATION: ABDOMEN

## 2020-06-25 ASSESSMENT — PAIN DESCRIPTION - ONSET
ONSET: ON-GOING
ONSET: ON-GOING

## 2020-06-25 NOTE — PROGRESS NOTES
GENERAL SURGERY  DAILY PROGRESS NOTE  6/25/2020    Chief Complaint   Patient presents with    Abdominal Pain     lower abdominal pain x 2 days; this morning pain is located only in the RLQ; denies nausea/vomiting     Constipation    Urinary Frequency     Subjective:  No events overnight, still with RLQ pain but feels like it is improving. No nausea or vomiting with clear liquids    Objective:  /88   Pulse 66   Temp 98.8 °F (37.1 °C) (Oral)   Resp 16   Ht 6' (1.829 m)   Wt 257 lb (116.6 kg)   SpO2 93%   BMI 34.86 kg/m²     General appearance: alert, cooperative and in no acute distress. Eyes: grossly normal  Lungs: nonlabored breathing  Heart: regular rate  Abdomen: soft, mildly tender RLQ, non distended  Skin: No skin abnormalities  Neurologic: Alert and oriented x 3.  Grossly normal  Musculoskeletal: No clubbing cyanosis or edema    Recent Labs     06/25/20  0435 06/24/20  0437 06/23/20  0933   WBC 8.4 10.3 11.0   HGB 14.8 15.9 17.1*   HCT 44.5 47.7 51.2   .1* 101.7* 101.0*    238 256     Lab Results   Component Value Date     06/25/2020    K 3.9 06/25/2020     06/25/2020    CO2 23 06/25/2020    BUN 11 06/25/2020    CREATININE 0.8 06/25/2020    GLUCOSE 100 06/25/2020    GLUCOSE 100 01/20/2012    CALCIUM 8.9 06/25/2020      Assessment/Plan:  72 y.o. male with diverticulitis    Advance to low fiber diet  Pain control - still taking percocet  HLIV  IV antibiotics - transition to omnicef/flagyl on discharge  Follow up with Dr. Rosalina Bhakta to arrange outpatient colonoscopy    Electronically signed by Louis Mcwilliams MD on 6/25/2020 at 9:06 AM

## 2020-06-25 NOTE — DISCHARGE SUMMARY
extension radiographs demonstrate no evidence of any malalignment. Mri Brain W Wo Contrast    Result Date: 5/31/2020  Comparison: CT brain May 30, 2020 Clinical indications: Technique: Multiplanar multisequence MRI of the brain was performed pre and post intravenous administration of 20 mL of ProHance gadolinium solution. FINDINGS: There is no abnormal increased signal intensity on the diffusion weighted sequence to suggest acute ischemic event. There is enlargement of the ventricles, sulci and cisterns bilaterally. There is patchy T2 hyperintensity in the periventricular deep white matter bilaterally. There is mild mucosal thickening within the paranasal sinuses but no fluid levels are evident. Otherwise the brain parenchyma, CSF spaces, paranasal sinuses and mastoid air cells and surrounding soft tissue and osseous structures have a satisfactory appearance. The gray-white matter junction is preserved. There is appropriate flow void within the vertebral basilar and carotid arterial systems to the level of the anterior and middle cerebral arteries. The posterior fossa and brainstem are unremarkable. The cerebellopontine angles, internal auditory canals, 7th and 8th nerve complexes, labyrinthine and cochlear structures are symmetric and unremarkable. The optic globes, optic nerves and orbital musculature are symmetric and normal in morphology. The amygdala and hippocampal formations are symmetric and unremarkable. The pituitary gland, pituitary stalk and sella turcica are unremarkable. The bony calvarium and scalp are unremarkable. The amygdala and hippocampal formations are symmetric and unremarkable. No evidence for acute ischemic event. Cortical atrophy and chronic periventricular microangiopathy are present. MICROBIOLOGY:  BLOOD CX #1  No results for input(s): BC in the last 72 hours. BLOOD CX #2  No results for input(s): Phoebe Gent in the last 72 hours.   TIP CULTURE  No results for input(s): CXCATHTIP in the last 72 hours. CULTURE, RESPIRATORY   No results for input(s): CULTRESP in the last 72 hours. RESPIRATORY SMEAR  No results for input(s): RESPSMEAR in the last 72 hours. DISPOSITION:  The patient's condition is fair. The patient is being discharged to home    DISCHARGE MEDICATIONS:    Nicole Hafsa   Home Medication Instructions DRZ:[id number]    Printed on:06/25/20 1018   Medication Information                      aspirin 81 MG chewable tablet  Take 1 tablet by mouth daily             cefdinir (OMNICEF) 300 MG capsule  Take 1 capsule by mouth 2 times daily for 10 days             levETIRAcetam (KEPPRA) 500 MG tablet  Take 1 tablet by mouth 2 times daily             metoprolol succinate (TOPROL XL) 25 MG extended release tablet  Take 1 tablet by mouth 2 times daily             metroNIDAZOLE (FLAGYL) 500 MG tablet  Take 1 tablet by mouth 3 times daily for 12 days             oxyCODONE-acetaminophen (PERCOCET) 5-325 MG per tablet  Take 1 tablet by mouth 2 times daily as needed for Pain for up to 30 days. Intended supply: 3 days. Take lowest dose possible to manage pain             pravastatin (PRAVACHOL) 10 MG tablet  Take 1 tablet by mouth nightly             vitamin C (ASCORBIC ACID) 500 MG tablet  Take 500 mg by mouth daily                  Discharge Medication List as of 6/25/2020 10:10 AM        Discharge Medication List as of 6/25/2020 10:10 AM        Discharge Medication List as of 6/25/2020 10:10 AM      START taking these medications    Details   cefdinir (OMNICEF) 300 MG capsule Take 1 capsule by mouth 2 times daily for 10 days, Disp-20 capsule, R-0Normal      metroNIDAZOLE (FLAGYL) 500 MG tablet Take 1 tablet by mouth 3 times daily for 12 days, Disp-36 tablet, R-0Normal             INTERNAL MEDICINE FOLLOW UP/INSTRUCTIONS:  · Follow-up with primary care physician as directed in discharge paperwork. · Please review results of imaging studies with PCP.   · Follow-up with consultants as directed by them. · If recurrence or worsening of symptoms go to the ED or call primary care physician. · Diet: DIET LOW FIBER    Preparing for this patient's discharge, including paperwork, orders, instructions, and meeting with patient did required 36 minutes.     Electronically signed by Nichole Roldan DO on 6/25/2020 at 9:37 PM

## 2020-06-26 RX ORDER — OXYCODONE HYDROCHLORIDE AND ACETAMINOPHEN 5; 325 MG/1; MG/1
1 TABLET ORAL 2 TIMES DAILY PRN
Qty: 60 TABLET | Refills: 0 | Status: SHIPPED
Start: 2020-06-26 | End: 2020-07-30 | Stop reason: SDUPTHER

## 2020-06-26 RX ORDER — OXYCODONE HYDROCHLORIDE AND ACETAMINOPHEN 5; 325 MG/1; MG/1
1 TABLET ORAL 2 TIMES DAILY PRN
Qty: 60 TABLET | Refills: 0 | Status: CANCELLED | OUTPATIENT
Start: 2020-06-26 | End: 2020-07-26

## 2020-06-29 ENCOUNTER — OFFICE VISIT (OUTPATIENT)
Dept: NEUROLOGY | Age: 70
End: 2020-06-29
Payer: MEDICARE

## 2020-06-29 VITALS
SYSTOLIC BLOOD PRESSURE: 145 MMHG | TEMPERATURE: 97.7 F | HEIGHT: 72 IN | OXYGEN SATURATION: 93 % | DIASTOLIC BLOOD PRESSURE: 91 MMHG | RESPIRATION RATE: 20 BRPM | HEART RATE: 69 BPM | WEIGHT: 249 LBS | BODY MASS INDEX: 33.72 KG/M2

## 2020-06-29 PROCEDURE — 99215 OFFICE O/P EST HI 40 MIN: CPT | Performed by: PHYSICIAN ASSISTANT

## 2020-06-29 NOTE — PATIENT INSTRUCTIONS
drowsiness, tiredness;  · weakness;  · feeling aggressive or irritable;  · loss of appetite;  · stuffy nose; or  · infection. This is not a complete list of side effects and others may occur. Call your doctor for medical advice about side effects. You may report side effects to FDA at 0-279-HMW-3734. What other drugs will affect levetiracetam?  Other drugs may affect levetiracetam, including prescription and over-the-counter medicines, vitamins, and herbal products. Tell your doctor about all your current medicines and any medicine you start or stop using. Where can I get more information? Your pharmacist can provide more information about levetiracetam.  Remember, keep this and all other medicines out of the reach of children, never share your medicines with others, and use this medication only for the indication prescribed. Every effort has been made to ensure that the information provided by [address] [place], [place] [address]can Dr is accurate, up-to-date, and complete, but no guarantee is made to that effect. Drug information contained herein may be time sensitive. [employer] information has been compiled for use by healthcare practitioners and consumers in the [place] and therefore [employer] does not warrant that uses outside of the [place] are appropriate, unless specifically indicated otherwise. [employer]'s drug information does not endorse drugs, diagnose patients or recommend therapy. [employer] drug information is an informational resource designed to assist licensed healthcare practitioners in caring for their patients and/or to serve consumers viewing this service as a supplement to, and not a substitute for, the expertise, skill, knowledge and judgment of healthcare practitioners. The absence of a warning for a given drug or drug combination in no way should be construed to indicate that the drug or drug combination is safe, effective or appropriate for any given patient.  [employer] does not assume any

## 2020-07-10 ENCOUNTER — OFFICE VISIT (OUTPATIENT)
Dept: PRIMARY CARE CLINIC | Age: 70
End: 2020-07-10
Payer: MEDICARE

## 2020-07-10 VITALS
HEART RATE: 72 BPM | SYSTOLIC BLOOD PRESSURE: 132 MMHG | DIASTOLIC BLOOD PRESSURE: 80 MMHG | RESPIRATION RATE: 18 BRPM | TEMPERATURE: 97 F | HEIGHT: 72 IN | BODY MASS INDEX: 34.13 KG/M2 | OXYGEN SATURATION: 96 % | WEIGHT: 252 LBS

## 2020-07-10 PROCEDURE — 1111F DSCHRG MED/CURRENT MED MERGE: CPT | Performed by: NURSE PRACTITIONER

## 2020-07-10 PROCEDURE — 99214 OFFICE O/P EST MOD 30 MIN: CPT | Performed by: NURSE PRACTITIONER

## 2020-07-10 NOTE — PROGRESS NOTES
() VITO  Sleep Study - (2014)  Influenza Vaccination - ()  Prevnar Vaccine - (2018) SLIP GIVEN  Shingrix Vaccine (Shingles) - (2018) SLIP GIVEN  1. Obesity. 2. Hyperlipidemia. BEGAN SIMVISTATIN 2/1/10  3. HYPERTENSION BEGAN LISINOPRIL/HCT 2/1/10  4. Erectile dysfunction. 5. History of basal cell carcinoma. 6. History of previous sebaceous cyst.  7. OA HAD BILATERAL KNEE REPLACEMENTS  8. CHRONIC BACK PAIN- TASH EXERCISES RECOMMENDED 3/11  9. ESOPHAGITIS/PUD-  VITO  10. FEVER/DIARRHEA/RASH - PROBABLY VIRAL 12 ADMITTED TO [facility]- SEE REPORTS  11. HYPOGONADISM- MRI ORDERED 12  12. SNORING-FATIGUE- SLEEP APNEA- SLEEP STUDY ORDERED 10/13-CPAP BEGUN  13. LUMBAR RADICULOPATHY-8/17/15-PHILLIP/DONNY-SURGERY SCHEDULED 17  14. PNEUMONIA- 5/10/16-CXR CLEARED  15. CARDIOMEGALY-16-NORMAL ECHO 16 and  16. LOW BACK PAIN- REFERRED TO [facility] 18  17. OBESITY- GLP1 DISCUSSED 10/31/18  18. CHF-- referred to [facility]. Bilateral Carpal Tunnel- wrist splint   20. Abnormal stres- cath planned 12/10/2019  21. CVA-   22. CAD- CABG 2019  23. POST-OP A FIB  24. Seizure-Keppra-2020  25. Diverticulitis-splenic injury? 2020  Surgical Hx:  Knee Replacement - () BILATERAL-  1. He had right shoulder surgery in .  2. He had a vasectomy at age 33.  1. He had bilateral knee replacements in . 4. Sebaceous cyst removal by Dr. Kleber Mckeon in . 5. Basal cell carcinoma by Dr. Hannah Chapin in . 6. CABG- BHAKTI- 2019  Reviewed and updated. FH:  Father:  Cerebrovascular Accident (CVA) - age 80. Mother:  Leukemia -  in her 29's. Reviewed, no changes. SH:  Marital: . Personal Habits: Cigarette Use: Former Cigarette Smoker 1 Pack Daily - for 30 years Negative For  current cigarette smoker. Alcohol: Occasionally consumes alcohol. Daily Caffeine: Consumes on  average 4 cups of hot tea per day - more recently 2 cups. Exercise Type: Walks Rastafarian service: Not on file     Active member of club or organization: Not on file     Attends meetings of clubs or organizations: Not on file     Relationship status: Not on file    Intimate partner violence     Fear of current or ex partner: Not on file     Emotionally abused: Not on file     Physically abused: Not on file     Forced sexual activity: Not on file   Other Topics Concern    Not on file   Social History Narrative    Not on file       Vitals:    07/10/20 0826   BP: 132/80   Site: Left Upper Arm   Position: Sitting   Cuff Size: Medium Adult   Pulse: 72   Resp: 18   Temp: 97 °F (36.1 °C)   TempSrc: Temporal   SpO2: 96%   Weight: 252 lb (114.3 kg)   Height: 6' (1.829 m)       Physical Exam  Exam:  Const: Appears healthy,well developed and well nourished. Appears obese. Eyes: EOMI in both eyes. PERRL. ENMT: External canals are clear and dry. Tympanic membranes: thickening. External nose WNL. Moistness and normal color of the nasal mucosae. Septum is in the midline. Full upper and full lower  dentures. Gums appear healthy. Posterior pharynx shows no exudate, irritation or redness. Neck: Supple and symmetric. Palpation reveals no adenopathy. No masses appreciated. Thyroid  exhibits no nodules or thyromegaly. No JVD. Resp: Respirations are unlabored. Respiration rate is normal.    CV: Rhythm is regular. S1 is normal. S2 is normal. No heart murmur appreciated. Carotids: no  bruits. Abdominal aorta is not palpable. Pedal pulses: 2+ and equal bilaterally No abdominal bruits. Surgical incisions to anterior chest well-healed. Cardiac monitor in place. Extremities: No clubbing, cyanosis or edema. Abdomen: Bowel sounds are normoactive. Palpation of the abdomen reveals softness, but no  distension, organomegaly or tenderness. No abdominal masses. No palpable hepatosplenomegaly. Musculo: Walks with a limping gait. Upper Extremities: Full ROM bilaterally.  Lower Extremities:  Limitation of the lower extremities. Skin: Dry and warm with no rash. Neuro: Alert and oriented x3. Mood is normal. Affect is normal. Speech is articulate and fluent. Psych: Patient's attitude is cooperative. Patient's affect is appropriate. Judgement is realistic. Insight  is appropriate. Lillian Subramanian was seen today for follow-up from hospital.    Diagnoses and all orders for this visit:    Acute diverticulitis  -     [place] DISCHARGE MEDS RECONCILED W/ CURRENT OUTPATIENT MED LIST    Splenic infarction  -     US SPLEEN; Future    Coronary artery disease involving native coronary artery of native heart without angina pectoris    Seizure as late effect of cerebrovascular accident (CVA) ([facility]      I did discuss with Dr. Silver June. The patient will be sent for stat ultrasound of the spleen to further quantify the abnormality seen on the CT. Further recommendations will be made pending these results. The patient will be seen back in the office as scheduled in about a month for previously scheduled follow-up.

## 2020-07-30 RX ORDER — OXYCODONE HYDROCHLORIDE AND ACETAMINOPHEN 5; 325 MG/1; MG/1
1 TABLET ORAL 2 TIMES DAILY PRN
Qty: 60 TABLET | Refills: 0 | Status: SHIPPED
Start: 2020-07-30 | End: 2020-09-04 | Stop reason: SDUPTHER

## 2020-07-30 NOTE — TELEPHONE ENCOUNTER
Last Appointment:  6/5/2020  Future Appointments   Date Time Provider John Barger   8/6/2020  8:00 AM Val Marie MD [facility]   10/1/2020  3:20 PM ZEE [facility] [id number] [facility] [address]

## 2020-08-06 ENCOUNTER — HOSPITAL ENCOUNTER (OUTPATIENT)
Age: 70
Discharge: HOME | End: 2020-08-08
Payer: MEDICARE

## 2020-08-06 ENCOUNTER — OFFICE VISIT (OUTPATIENT)
Dept: PRIMARY CARE CLINIC | Age: 70
End: 2020-08-06
Payer: MEDICARE

## 2020-08-06 VITALS
HEART RATE: 74 BPM | TEMPERATURE: 97.2 F | OXYGEN SATURATION: 98 % | SYSTOLIC BLOOD PRESSURE: 138 MMHG | WEIGHT: 255 LBS | BODY MASS INDEX: 34.58 KG/M2 | DIASTOLIC BLOOD PRESSURE: 88 MMHG

## 2020-08-06 LAB
ALBUMIN SERPL-MCNC: 4.2 G/DL (ref 3.5–5.2)
ALP BLD-CCNC: 82 U/L (ref 40–129)
ALT SERPL-CCNC: 24 U/L (ref 0–40)
ANION GAP SERPL CALCULATED.3IONS-SCNC: 15 MMOL/L (ref 7–16)
AST SERPL-CCNC: 25 U/L (ref 0–39)
BILIRUB SERPL-MCNC: 0.4 MG/DL (ref 0–1.2)
BUN BLDV-MCNC: 13 MG/DL (ref 8–23)
CALCIUM SERPL-MCNC: 9.7 MG/DL (ref 8.6–10.2)
CHLORIDE BLD-SCNC: 101 MMOL/L (ref 98–107)
CHOLESTEROL, TOTAL: 231 MG/DL (ref 0–199)
CO2: 25 MMOL/L (ref 22–29)
CREAT SERPL-MCNC: 0.8 MG/DL (ref 0.7–1.2)
GFR AFRICAN AMERICAN: >60
GFR NON-AFRICAN AMERICAN: >60 ML/MIN/1.73
GLUCOSE BLD-MCNC: 105 MG/DL (ref 74–99)
HDLC SERPL-MCNC: 60 MG/DL
LDL CHOLESTEROL CALCULATED: 128 MG/DL (ref 0–99)
POTASSIUM SERPL-SCNC: 4.5 MMOL/L (ref 3.5–5)
SODIUM BLD-SCNC: 141 MMOL/L (ref 132–146)
TOTAL PROTEIN: 7.8 G/DL (ref 6.4–8.3)
TRIGL SERPL-MCNC: 216 MG/DL (ref 0–149)
VLDLC SERPL CALC-MCNC: 43 MG/DL

## 2020-08-06 PROCEDURE — 99214 OFFICE O/P EST MOD 30 MIN: CPT | Performed by: INTERNAL MEDICINE

## 2020-08-06 PROCEDURE — 80061 LIPID PANEL: CPT

## 2020-08-06 PROCEDURE — 36415 COLL VENOUS BLD VENIPUNCTURE: CPT

## 2020-08-06 PROCEDURE — 80053 COMPREHEN METABOLIC PANEL: CPT

## 2020-08-06 RX ORDER — PRAVASTATIN SODIUM 10 MG
10 TABLET ORAL NIGHTLY
Qty: 90 TABLET | Refills: 1 | Status: SHIPPED
Start: 2020-08-06 | End: 2021-02-05 | Stop reason: SDUPTHER

## 2020-08-06 RX ORDER — METOPROLOL SUCCINATE 25 MG/1
25 TABLET, EXTENDED RELEASE ORAL DAILY
Qty: 90 TABLET | Refills: 1 | Status: SHIPPED
Start: 2020-08-06 | End: 2020-10-07 | Stop reason: SDUPTHER

## 2020-08-06 RX ORDER — LEVETIRACETAM 500 MG/1
500 TABLET ORAL 2 TIMES DAILY
Qty: 180 TABLET | Refills: 1 | Status: SHIPPED
Start: 2020-08-06 | End: 2021-02-05 | Stop reason: SDUPTHER

## 2020-08-06 NOTE — PROGRESS NOTES
This a patient status post hospitalization for acute diverticulitis. Patient had been evaluated previously when he had seizure activity for his lumbar spine. He continues to have significant problems with lumbar spine pain. Patient main complaint is at the L4-L5 distribution on the right-hand side. He is having symptoms however of lumbar stenosis. He requires to hang onto a shopping cart if he walks any distance or use a cane. Patient has not had any falls. He has had no further seizure activity. Recently seen by cardiology. He was monitored for 30 days. There is a question of whether or not a transesophageal echo is needed. Cardiology seems to think that this is not the case. Neurology does seem to think this might be the case. Korey Holden   YOB: 1950    Date of Office Visit:  8/6/2020  Date of Hospital Admission: 6/23/20  Date of Hospital Discharge: 6/25/20  Risk of hospital readmission (high >=14%.  Medium >=10%) :Readmission Risk Score: 12      Care management risk score Rising risk (score 2-5) and Complex Care (Scores >=6): 6     Non face to face  following discharge, date last encounter closed (first attempt may have been earlier): *No documented post hospital discharge outreach found in the last 14 days    Call initiated 2 business days of discharge: *No response recorded in the last 14 days    Patient Active Problem List   Diagnosis    Hypertension    Primary osteoarthritis involving multiple joints    Hyperlipidemia    Obesity    Bilateral carpal tunnel syndrome    Erectile dysfunction    Sleep apnea    Hypogonadism in male    Osteoarthritis    Pleural effusion    Postoperative atrial fibrillation (HCC)    Iron deficiency anemia due to chronic blood loss    Chronic fatigue    Radiculopathy    Elevated PSA    Hx of lumbosacral spine surgery    Subarachnoid hemorrhage (HCC)    Seizure as late effect of cerebrovascular accident (CVA) ([address]    Acute diverticulitis    CPAP (continuous positive airway pressure) dependence    Chronic back pain    Cardiomegaly    CAD (coronary artery disease)       Allergies   Allergen Reactions    Statins Other (See Comments)     Myalgias        Medications listed as ordered at the time of discharge from [facility] Medication Instructions ANA LILIA:    Printed on:08/06/20 0834   Medication Information                      aspirin 81 MG chewable tablet  Take 1 tablet by mouth daily             levETIRAcetam (KEPPRA) 500 MG tablet  Take 1 tablet by mouth 2 times daily             metoprolol succinate (TOPROL XL) 25 MG extended release tablet  Take 1 tablet by mouth daily             oxyCODONE-acetaminophen (PERCOCET) 5-325 MG per tablet  Take 1 tablet by mouth 2 times daily as needed for Pain for up to 30 days. Intended supply: 3 days. Take lowest dose possible to manage pain             pravastatin (PRAVACHOL) 10 MG tablet  Take 1 tablet by mouth nightly             vitamin C (ASCORBIC ACID) 500 MG tablet  Take 500 mg by mouth daily                    Medications marked \"taking\" at this time  Outpatient Medications Marked as Taking for the 8/6/20 encounter (Office Visit) with Diamond Riggins MD   Medication Sig Dispense Refill    pravastatin (PRAVACHOL) 10 MG tablet Take 1 tablet by mouth nightly 90 tablet 1    metoprolol succinate (TOPROL XL) 25 MG extended release tablet Take 1 tablet by mouth daily 90 tablet 1    levETIRAcetam (KEPPRA) 500 MG tablet Take 1 tablet by mouth 2 times daily 180 tablet 1    oxyCODONE-acetaminophen (PERCOCET) 5-325 MG per tablet Take 1 tablet by mouth 2 times daily as needed for Pain for up to 30 days. Intended supply: 3 days.  Take lowest dose possible to manage pain 60 tablet 0    vitamin C (ASCORBIC ACID) 500 MG tablet Take 500 mg by mouth daily       aspirin 81 MG chewable tablet Take 1 tablet by mouth daily 30 tablet 3        Medications patient taking as of now reconciled against medications ordered at time of hospital discharge: Yes    Chief Complaint   Patient presents with    Hyperlipidemia    Hypertension       History of Present illness - Follow up of Hospital diagnosis(es):    Inpatient course: Discharge summary reviewed- see chart. Vitals:    08/06/20 0753   BP: 138/88   Pulse: 74   Temp: 97.2 °F (36.2 °C)   TempSrc: Temporal   SpO2: 98%   Weight: 255 lb (115.7 kg)     Body mass index is 34.58 kg/m². Wt Readings from Last 3 Encounters:   08/06/20 255 lb (115.7 kg)   07/10/20 252 lb (114.3 kg)   06/29/20 249 lb (112.9 kg)     BP Readings from Last 3 Encounters:   08/06/20 138/88   07/10/20 132/80   06/29/20 (!) 145/91        Physical Exam:    Tympanic membrane's are clear bilaterally. No anterior or posterior cervical adenopathy. Lungs are clear to auscultation without wheeze rhonchi or rales. Heart is regular without ectopy. Abdomen is obese positive bowel sounds and soft. Extremities trace to 1+ edema below the mid tibia bilaterally. No focal neurologic deficits are detected on neurologic examination. Assessment/Plan:  Evelin Perdomo was seen today for hyperlipidemia and hypertension. Diagnoses and all orders for this visit:    Coronary artery disease involving native coronary artery of native heart without angina pectoris  -     Lipid Panel; Future  -     Comprehensive Metabolic Panel; Future    Essential hypertension    Lumbar radiculopathy  -     Therese Grossman MD, Neurosurgery, [facility]    Primary osteoarthritis involving multiple joints    Mixed hyperlipidemia  -     Lipid Panel; Future  -     Comprehensive Metabolic Panel; Future    Other orders  -     pravastatin (PRAVACHOL) 10 MG tablet; Take 1 tablet by mouth nightly  -     metoprolol succinate (TOPROL XL) 25 MG extended release tablet; Take 1 tablet by mouth daily  -     levETIRAcetam (KEPPRA) 500 MG tablet;  Take 1 tablet by mouth 2 times daily    The patient will be referred to neurosurgery for possible lumbar surgery. I am sure he will need clearance by neurology before this occurs. Patient just recently was evaluated by cardiology and I am sure this will not be an issue. Patient is to be seen back in 2 months. His blood sugars are climbing I did tell him that he needs to be vigilant about weight loss. He is to have a lipid profile and CMP today.

## 2020-09-04 RX ORDER — OXYCODONE HYDROCHLORIDE AND ACETAMINOPHEN 5; 325 MG/1; MG/1
1 TABLET ORAL 2 TIMES DAILY PRN
Qty: 60 TABLET | Refills: 0 | Status: SHIPPED
Start: 2020-09-04 | End: 2020-10-07 | Stop reason: SDUPTHER

## 2020-09-04 NOTE — TELEPHONE ENCOUNTER
Last Appointment:  8/6/2020  Future Appointments   Date Time Provider John Denita   10/2/2020 10:20 AM ZEE [facility]   10/7/2020 10:00 AM Meena Ray  W [address] [place] [address]

## 2020-09-17 ENCOUNTER — TELEPHONE (OUTPATIENT)
Dept: NEUROSURGERY | Age: 70
End: 2020-09-17

## 2020-09-17 NOTE — TELEPHONE ENCOUNTER
Received a referral to see PT for back pain but was seen in hospital in June- no surgical intervention needed and recommends conservative therapies. Spoke with patient, he has not had injections or PT since then. Would like a call to discuss Pain Management.  Pts #[phone]

## 2020-10-07 ENCOUNTER — OFFICE VISIT (OUTPATIENT)
Dept: FAMILY MEDICINE CLINIC | Age: 70
End: 2020-10-07
Payer: MEDICARE

## 2020-10-07 VITALS
BODY MASS INDEX: 35.49 KG/M2 | DIASTOLIC BLOOD PRESSURE: 92 MMHG | WEIGHT: 262 LBS | HEIGHT: 72 IN | SYSTOLIC BLOOD PRESSURE: 162 MMHG

## 2020-10-07 LAB — HBA1C MFR BLD: 6.4 %

## 2020-10-07 PROCEDURE — G0008 ADMIN INFLUENZA VIRUS VAC: HCPCS | Performed by: INTERNAL MEDICINE

## 2020-10-07 PROCEDURE — 99214 OFFICE O/P EST MOD 30 MIN: CPT | Performed by: INTERNAL MEDICINE

## 2020-10-07 PROCEDURE — 90694 VACC AIIV4 NO PRSRV 0.5ML IM: CPT | Performed by: INTERNAL MEDICINE

## 2020-10-07 PROCEDURE — 83036 HEMOGLOBIN GLYCOSYLATED A1C: CPT | Performed by: INTERNAL MEDICINE

## 2020-10-07 RX ORDER — OXYCODONE HYDROCHLORIDE AND ACETAMINOPHEN 5; 325 MG/1; MG/1
1 TABLET ORAL 2 TIMES DAILY PRN
Qty: 60 TABLET | Refills: 0 | Status: SHIPPED | OUTPATIENT
Start: 2020-10-07 | End: 2020-11-06 | Stop reason: SDUPTHER

## 2020-10-07 RX ORDER — METOPROLOL SUCCINATE 25 MG/1
25 TABLET, EXTENDED RELEASE ORAL DAILY
Qty: 90 TABLET | Refills: 1 | Status: SHIPPED
Start: 2020-10-07 | End: 2021-05-20 | Stop reason: SDUPTHER

## 2020-10-07 RX ORDER — ANTI-ITCH 1 G/100G
28.4 OINTMENT TOPICAL 2 TIMES DAILY
Qty: 1 TUBE | Refills: 1 | Status: SHIPPED
Start: 2020-10-07 | End: 2022-07-18

## 2020-10-07 RX ORDER — OXYCODONE HYDROCHLORIDE AND ACETAMINOPHEN 5; 325 MG/1; MG/1
1 TABLET ORAL 2 TIMES DAILY PRN
Qty: 60 TABLET | Refills: 0 | Status: SHIPPED | OUTPATIENT
Start: 2020-10-07 | End: 2020-10-07

## 2020-10-07 NOTE — PROGRESS NOTES
daily             metoprolol succinate (TOPROL XL) 25 MG extended release tablet  Take 1 tablet by mouth daily             pravastatin (PRAVACHOL) 10 MG tablet  Take 1 tablet by mouth nightly             vitamin C (ASCORBIC ACID) 500 MG tablet  Take 500 mg by mouth daily                    Medications marked \"taking\" at this time  Outpatient Medications Marked as Taking for the 10/7/20 encounter (Office Visit) with Kailee Elliott MD   Medication Sig Dispense Refill    pravastatin (PRAVACHOL) 10 MG tablet Take 1 tablet by mouth nightly 90 tablet 1    metoprolol succinate (TOPROL XL) 25 MG extended release tablet Take 1 tablet by mouth daily 90 tablet 1    levETIRAcetam (KEPPRA) 500 MG tablet Take 1 tablet by mouth 2 times daily 180 tablet 1    vitamin C (ASCORBIC ACID) 500 MG tablet Take 500 mg by mouth daily       aspirin 81 MG chewable tablet Take 1 tablet by mouth daily 30 tablet 3        Medications patient taking as of now reconciled against medications ordered at time of hospital discharge: Yes    Chief Complaint   Patient presents with    Coronary Artery Disease       History of Present illness - Follow up of Hospital diagnosis(es):    Inpatient course: Discharge summary reviewed- see chart. Vitals:    10/07/20 0952   BP: (!) 162/92   Weight: 262 lb (118.8 kg)   Height: 6' (1.829 m)     Body mass index is 35.53 kg/m². Wt Readings from Last 3 Encounters:   10/07/20 262 lb (118.8 kg)   08/06/20 255 lb (115.7 kg)   07/10/20 252 lb (114.3 kg)     BP Readings from Last 3 Encounters:   10/07/20 (!) 162/92   08/06/20 138/88   07/10/20 132/80        Physical Exam:    Tympanic membrane's are clear bilaterally. No anterior or posterior cervical adenopathy. Lungs are clear to auscultation without wheeze rhonchi or rales. Heart is regular without ectopy. Abdomen is obese positive bowel sounds and soft. Extremities trace to 1+ edema below the mid tibia bilaterally.   No focal neurologic deficits are detected on neurologic examination. Assessment/Plan:  Dinorah Pak was seen today for coronary artery disease. Diagnoses and all orders for this visit:    Coronary artery disease involving native coronary artery of native heart without angina pectoris    Lumbar radiculopathy  -     oxyCODONE-acetaminophen (PERCOCET) 5-325 MG per tablet; Take 1 tablet by mouth 2 times daily as needed for Pain for up to 30 days. Intended supply: 3 days. Take lowest dose possible to manage pain  -     External Referral To Orthopedic Surgery    Glucose intolerance  -     POCT glycosylated hemoglobin (Hb A1C)    Essential hypertension    Primary osteoarthritis involving multiple joints    Mixed hyperlipidemia    Obstructive sleep apnea syndrome    Other orders  -     metoprolol succinate (TOPROL XL) 25 MG extended release tablet; Take 1 tablet by mouth daily  -     Hydrocortisone Acetate 1 % OINT; Apply 28.4 g topically 2 times daily  -     INFLUENZA, QUADV, ADJUVANTED, 65 YRS =, IM, PF, PREFILL SYR, 0.5ML (FLUAD)    Patient will have a hemoglobin A1c today. He will have a high-dose flu vaccine. He is to follow-up with neurology in October. Patient is referred to Dr. CASA SONU [facility] for evaluation of lumbar radiculopathy. Pain management is performed. OARRS report is verified.

## 2020-10-13 ENCOUNTER — OFFICE VISIT (OUTPATIENT)
Dept: NEUROLOGY | Age: 70
End: 2020-10-13
Payer: MEDICARE

## 2020-10-13 VITALS
HEIGHT: 72 IN | BODY MASS INDEX: 35.21 KG/M2 | WEIGHT: 260 LBS | SYSTOLIC BLOOD PRESSURE: 142 MMHG | TEMPERATURE: 97.7 F | DIASTOLIC BLOOD PRESSURE: 80 MMHG

## 2020-10-13 PROCEDURE — 99213 OFFICE O/P EST LOW 20 MIN: CPT | Performed by: PHYSICIAN ASSISTANT

## 2020-10-13 NOTE — PATIENT INSTRUCTIONS
Patient Education        levetiracetam  Pronunciation:  ANDREW joe RA, se rubi  Brand:  Keppra, Keppra XR, Roweepra, Spritam  What is the most important information I should know about levetiracetam?  Some people have thoughts about suicide when first taking this medicine. Stay alert to changes in your mood or symptoms. Report any new or worsening symptoms to your doctor. What is levetiracetam?  Levetiracetam is used to treat partial onset seizures in adults and children who are at least 3month old. The Spritam brand of this medicine is not for use in children younger than 3years old or children who weigh less than 44 pounds. Levetiracetam is also used to treat tonic-clonic seizures in people who are at least 10years old, and myoclonic seizures in people who are at least 15years old. Levetiracetam may also be used for purposes not listed in this medication guide. What should I discuss with my healthcare provider before taking levetiracetam?  You should not use levetiracetam if you are allergic to it. Tell your doctor if you have ever had:  · kidney disease (or if you are on dialysis);  · depression or other mood problems;  · mental illness or psychosis; or  · suicidal thoughts or actions. You may have thoughts about suicide while taking this medicine. Your doctor should check your progress at regular visits. Your family or other caregivers should also be alert to changes in your mood or symptoms. Follow your doctor's instructions about taking seizure medication if you are pregnant. Seizure control is very important during pregnancy, and having a seizure could harm both mother and baby. Your dose needs may be different during pregnancy. Do not start or stop taking this medicine without your doctor's advice, and tell your doctor right away if you become pregnant. If you are pregnant, your name may be listed on a pregnancy registry to track the effects of levetiracetam on the baby.   It may not be safe to breastfeed while using this medicine. Ask your doctor about any risk. Do not give this medicine to a child without medical advice. How should I take levetiracetam?  Follow all directions on your prescription label and read all medication guides or instruction sheets. Your doctor may occasionally change your dose. Use the medicine exactly as directed. Take the medicine at the same time each day, with or without food. Levetiracetam doses are based on weight in children. Your child's dose needs may change if the child gains or loses weight. Your dose needs may change if you switch to a different brand, strength, or form of this medicine. Avoid medication errors by using only the form and strength your doctor prescribes. Measure liquid medicine carefully. Use the dosing syringe provided, or use a medicine dose-measuring device (not a kitchen spoon). Swallow the extended-release tablet  whole and do not crush, chew, or break it. To take the Spritam dissolvable tablet:  · Remove a tablet from the package only when you are ready to take the medicine. · Use dry hands to remove the tablet and place it on your tongue. Then take a sip of liquid and hold it in your mouth. · Do not swallow the tablet whole. Allow it to dissolve in your mouth with the sip of liquid. · Swallow only after the tablet has completely dissolved, which should take less than 30 seconds. You should not stop using levetiracetam suddenly, unless your doctor tells you to stop the medicine because of a serious side effect. Stopping suddenly may cause increased seizures. Follow your doctor's instructions very carefully. Use all seizure medications as directed and read all medication guides you receive. Do not change your dose or dosing schedule without your doctor's advice. In case of emergency, wear or carry medical identification to let others know you use seizure medication. Your kidney function may need to be tested often.   Store at room drowsiness, tiredness;  · weakness;  · feeling aggressive or irritable;  · loss of appetite;  · stuffy nose; or  · infection. This is not a complete list of side effects and others may occur. Call your doctor for medical advice about side effects. You may report side effects to FDA at 2-607-JWS-4146. What other drugs will affect levetiracetam?  Other drugs may affect levetiracetam, including prescription and over-the-counter medicines, vitamins, and herbal products. Tell your doctor about all your current medicines and any medicine you start or stop using. Where can I get more information? Your pharmacist can provide more information about levetiracetam.  Remember, keep this and all other medicines out of the reach of children, never share your medicines with others, and use this medication only for the indication prescribed. Every effort has been made to ensure that the information provided by [address] [place], [place] [address] is accurate, up-to-date, and complete, but no guarantee is made to that effect. Drug information contained herein may be time sensitive. [employer] information has been compiled for use by healthcare practitioners and consumers in the [place] and therefore [employer] does not warrant that uses outside of the [place] are appropriate, unless specifically indicated otherwise. [employer] drug information does not endorse drugs, diagnose patients or recommend therapy. [employer] drug information is an informational resource designed to assist licensed healthcare practitioners in caring for their patients and/or to serve consumers viewing this service as a supplement to, and not a substitute for, the expertise, skill, knowledge and judgment of healthcare practitioners. The absence of a warning for a given drug or drug combination in no way should be construed to indicate that the drug or drug combination is safe, effective or appropriate for any given patient.  [employer] does not assume any responsibility for any aspect of healthcare administered with the aid of information [facility] provides. The information contained herein is not intended to cover all possible uses, directions, precautions, warnings, drug interactions, allergic reactions, or adverse effects. If you have questions about the drugs you are taking, check with your doctor, nurse or pharmacist.  Copyright 5731-6116 [address]: 12.02. Revision date: 12/3/2019. Care instructions adapted under license by [facility] ([facility]). If you have questions about a medical condition or this instruction, always ask your healthcare professional. [place] [address] any warranty or liability for your use of this information.

## 2020-10-13 NOTE — PROGRESS NOTES
[address] [facility]. Rosa M Stewart M.D., F.A.C.P. Steven Arambula, DNP, APRN, ACNS-BC  [facility]. Kelvin Turner, MSN, APRN-FNP-C  [place], PAGeneC  Hollis Hernandez, MSN, APRN-FNP-C  [address], [facility]  [place], [address] [address]  Phone: [phone]  Fax: [phone]       Elbert Noe is a 72 y.o. right handed male     He presents for follow up of stroke and post-infarct seizures     Past medical history significant for hypertension, hyperlipidemia, CAD status post CABG, BEBETO (CPAP compliant), lumbar radiculopathy, right parietal stroke November 2018, postoperative A. fib, former smoker. He presents alone, with his wife on speaker phone. They are both good historians. Patient was first seen by our group in the hospital 11/4/2019. He had presented after an episode of erratic movements of the left arm. MRI of the brain showed an acute right parietal infarct. CTA head and neck without significant stenosis. Echo with bubble was unrevealing. He did follow up with [facility] cardiology and had a 30 days Holter monitor which did not reveal any arrhythmias. They did not recommend any further cardiac monitoring. He did undergo CABG x3 in December 2019. Of note, he did have postoperative Afib     In June 2020 he presented again to the hospital after an episode of bilateral arm shaking and altered mental status reported by his wife. The event was described as chewing motion with his mouth and then repeated vocal sounds. He turned his head towards her and both arms began to shake and he did not respond. This lasted approximately 30 seconds followed by 15 minutes of confusion. An EEG was obtained and normal. It was recommended he continue on Keppra. He is maintained on aspirin and Pravachol for secondary stroke prevention   He had intolerance with Lipitor and Crestor with weakness and fatigue.    He is tolerating Pravachol well-- however LDL is still elevated at 128   PCP is recommending non statin options which I agree     He has had no further seizures and is tolerating Keppra well. He does admit to drowsiness and dizziness-- these are tolerable at this  time   He is not driving     He tells me he is to have a new sleep study. He does have BEBETO and has not been reevaluated in 20 years. I agree he should have this reevaluated, as he may need setting adjustments on his CPAP which could be contributing to his daytime fatigue     No chest pain or palpitations  No SOB  No vertigo, lightheadedness or loss of consciousness  No falls, tripping or stumbling  No incontinence of bowels or bladder  No itching or bruising appreciated  No numbness, tingling or focal arm/leg weakness    ROS is otherwise negative    Current Outpatient Medications   Medication Sig Dispense Refill    metoprolol succinate (TOPROL XL) 25 MG extended release tablet Take 1 tablet by mouth daily 90 tablet 1    oxyCODONE-acetaminophen (PERCOCET) 5-325 MG per tablet Take 1 tablet by mouth 2 times daily as needed for Pain for up to 30 days. Intended supply: 3 days. Take lowest dose possible to manage pain 60 tablet 0    pravastatin (PRAVACHOL) 10 MG tablet Take 1 tablet by mouth nightly 90 tablet 1    levETIRAcetam (KEPPRA) 500 MG tablet Take 1 tablet by mouth 2 times daily 180 tablet 1    vitamin C (ASCORBIC ACID) 500 MG tablet Take 500 mg by mouth daily       aspirin 81 MG chewable tablet Take 1 tablet by mouth daily 30 tablet 3    Hydrocortisone Acetate 1 % OINT Apply 28.4 g topically 2 times daily (Patient not taking: Reported on 10/13/2020) 1 Tube 1     No current facility-administered medications for this visit.         Objective:       BP (!) 142/80 (Site: Right Upper Arm)   Temp 97.7 °F (36.5 °C)   Ht 6' (1.829 m)   Wt 260 lb (117.9 kg)   BMI 35.26 kg/m²     General appearance: alert, appears stated age, cooperative and in no distress  Head: normocephalic, without obvious abnormality, atraumatic- obese   Eyes: conjunctivae/corneas clear; no drainage  Neck: no adenopathy, no carotid bruit, supple, symmetrical, trachea midline and thyroid not enlarged, symmetric, no tenderness/mass/nodules  Lungs: clear to auscultation bilaterally  Heart: regular rate and rhythm, S1, S2 normal, no murmur  Extremities: normal, atraumatic, no cyanosis or edema  Pulses: 2+ and symmetric  Skin:  color, texture, turgor normal--no rashes or lesions      Mental Status: alert and oriented. Thought content appropriate.  Pleasant     Appropriate attention/concentration  Intact fundus of knowledge  Repetition intact  Memories intact    Speech: no dysarthria  Language: no aphasias    Cranial Nerves:  I: smell    II: visual acuity     II: visual fields Full    II: pupils SAMMIE   III,VII: ptosis None   III,IV,VI: extraocular muscles  EOMI without nystagmus   V: mastication Normal   V: facial light touch sensation  Normal   V,VII: corneal reflex     VII: facial muscle function - upper  Normal   VII: facial muscle function - lower Normal   VIII: hearing Normal   IX: soft palate elevation  Normal   IX,X: gag reflex    XI: trapezius strength  5/5   XI: sternocleidomastoid strength 5/5   XI: neck extension strength  5/5   XII: tongue strength  Normal     Motor:  5/5 throughout except 5-/5 R deltoid (chronic rotator cuff injury)   Normal  Tone  Obese bulk   No drift   No abnormal movements    Sensory:  LT  normal       Coordination:   FN, FFM  normal  HS normal    Gait:  Antalgic     DTR:   No reflexes     No Levy's    Laboratory/Radiology:  ry/Radiology:     CBC with Differential:    Lab Results   Component Value Date    WBC 8.4 06/25/2020    RBC 4.40 06/25/2020    HGB 14.8 06/25/2020    HCT 44.5 06/25/2020     06/25/2020    .1 06/25/2020    MCH 33.6 06/25/2020    MCHC 33.3 06/25/2020    RDW 13.0 06/25/2020    SEGSPCT 61.2 05/29/2020    LYMPHOPCT 18.9 06/25/2020    MONOPCT 11.6 06/25/2020    BASOPCT 0.8 06/25/2020 MONOSABS 0.97 06/25/2020    LYMPHSABS 1.58 06/25/2020    EOSABS 0.47 06/25/2020    BASOSABS 0.07 06/25/2020     CMP:    Lab Results   Component Value Date     08/06/2020    K 4.5 08/06/2020    K 3.9 06/25/2020     08/06/2020    CO2 25 08/06/2020    BUN 13 08/06/2020    CREATININE 0.8 08/06/2020    GFRAA >60 08/06/2020    AGRATIO 1.1 05/29/2020    LABGLOM >60 08/06/2020    GLUCOSE 105 08/06/2020    GLUCOSE 100 01/20/2012    PROT 7.8 08/06/2020    LABALBU 4.2 08/06/2020    LABALBU 3.7 01/19/2012    CALCIUM 9.7 08/06/2020    BILITOT 0.4 08/06/2020    ALKPHOS 82 08/06/2020    AST 25 08/06/2020    ALT 24 08/06/2020     HgBA1c:    Lab Results   Component Value Date    LABA1C 6.4 10/07/2020     FLP:    Lab Results   Component Value Date    TRIG 216 08/06/2020    HDL 60 08/06/2020    LDLCALC 128 08/06/2020    LABVLDL 43 08/06/2020     No recent imaging for me to review today     All labs and images were personally reviewed at the time of this visit    Assessment:     Cryptogenic, embolic appearing right parietal stroke 11/2019   Vessel imaging without significant stenosis or occlusion   Extended cardiac monitoring did not show any arrhythmias    Continues on aspirin and Pravachol without ill effect for secondary  prevention    Suspected postinfarct seizures   Manifested as automatisms and arm shaking   No further recurrences on Keppra 500 mg twice daily   No driving until 49/12/7786 if no further events -- he verbalized  understanding    Drowsiness and dizziness reported on Keppra, but tolerable at this time     LS radiculopathy       Plan:     Continue ASA and statin     Continue Keppra    No driving     Agree with BEBETO reevaluation     Lifestyle mods as above     RTO 4 months     Call with questions or concerns     Anali Ricks PA-C  9:42 AM  10/13/2020    I spent 15 minutes with this patient obtaining the HPI and discussing the exam with greater than 50% of the time providing counseling and education on

## 2020-10-20 ENCOUNTER — TELEPHONE (OUTPATIENT)
Dept: FAMILY MEDICINE CLINIC | Age: 70
End: 2020-10-20

## 2020-10-20 NOTE — TELEPHONE ENCOUNTER
Dillon Haji calling to tell you that he called [facility] and was told that they can get him scheduled pretty quickly if you send them the order.

## 2020-10-30 ENCOUNTER — TELEPHONE (OUTPATIENT)
Dept: FAMILY MEDICINE CLINIC | Age: 70
End: 2020-10-30

## 2020-10-31 NOTE — TELEPHONE ENCOUNTER
Call [facility] sleep center and have them fax this [place] document over.   Then we can order the sleep study

## 2020-11-06 RX ORDER — OXYCODONE HYDROCHLORIDE AND ACETAMINOPHEN 5; 325 MG/1; MG/1
1 TABLET ORAL 2 TIMES DAILY PRN
Qty: 60 TABLET | Refills: 0 | Status: SHIPPED | OUTPATIENT
Start: 2020-11-06 | End: 2020-12-06

## 2020-11-06 NOTE — TELEPHONE ENCOUNTER
Last Appointment:  10/7/2020  Future Appointments   Date Time Provider John Barger   2/5/2021  9:30 AM [place]  [place] calling for refill on percocet

## 2020-11-19 ENCOUNTER — TELEPHONE (OUTPATIENT)
Dept: FAMILY MEDICINE CLINIC | Age: 70
End: 2020-11-19

## 2020-11-19 NOTE — TELEPHONE ENCOUNTER
He went to to [facility] today to consult and set up treatment and therapy. After he got home, he got a call from the therapist who had just spoken to Dr Sunni Rowan at [facility] and told him to go to the ER. He told them he does not need the ER for the numbness in the fingers and toes, because he has had this for years. But that was the direction from both of them. He is asking if you agree and want him to go to the ER as well.

## 2020-12-08 RX ORDER — OXYCODONE HYDROCHLORIDE AND ACETAMINOPHEN 5; 325 MG/1; MG/1
1 TABLET ORAL 2 TIMES DAILY
Qty: 60 TABLET | Refills: 0 | Status: SHIPPED | OUTPATIENT
Start: 2020-12-08 | End: 2021-01-07

## 2020-12-08 NOTE — TELEPHONE ENCOUNTER
Last Appointment:  10/7/2020  Future Appointments   Date Time Provider John Barger   2/5/2021  9:30 AM Eduardo Randhawa  [address] [place]      Patient calling for refill on generic percocet to lease drug

## 2021-01-06 DIAGNOSIS — Z98.890 HX OF LUMBOSACRAL SPINE SURGERY: ICD-10-CM

## 2021-01-07 RX ORDER — OXYCODONE HYDROCHLORIDE AND ACETAMINOPHEN 5; 325 MG/1; MG/1
1 TABLET ORAL 2 TIMES DAILY
Qty: 60 TABLET | Refills: 0 | OUTPATIENT
Start: 2021-01-07 | End: 2021-02-06

## 2021-01-07 NOTE — TELEPHONE ENCOUNTER
Patient does not have a pain contract and he needs reassessed. He may need to be actually going to pain management rather than coming through me to get his pain medication. Please let him know to schedule.

## 2021-02-05 ENCOUNTER — OFFICE VISIT (OUTPATIENT)
Dept: FAMILY MEDICINE CLINIC | Age: 71
End: 2021-02-05
Payer: MEDICARE

## 2021-02-05 VITALS
HEART RATE: 65 BPM | DIASTOLIC BLOOD PRESSURE: 84 MMHG | RESPIRATION RATE: 16 BRPM | SYSTOLIC BLOOD PRESSURE: 160 MMHG | TEMPERATURE: 98.4 F | BODY MASS INDEX: 36.89 KG/M2 | OXYGEN SATURATION: 97 % | WEIGHT: 272 LBS

## 2021-02-05 DIAGNOSIS — M54.16 LUMBAR RADICULOPATHY: ICD-10-CM

## 2021-02-05 DIAGNOSIS — I69.398 SEIZURE AS LATE EFFECT OF CEREBROVASCULAR ACCIDENT (CVA) (HCC): ICD-10-CM

## 2021-02-05 DIAGNOSIS — I10 ESSENTIAL HYPERTENSION: ICD-10-CM

## 2021-02-05 DIAGNOSIS — R79.9 ABNORMAL FINDING OF BLOOD CHEMISTRY, UNSPECIFIED: ICD-10-CM

## 2021-02-05 DIAGNOSIS — N52.01 ERECTILE DYSFUNCTION DUE TO ARTERIAL INSUFFICIENCY: ICD-10-CM

## 2021-02-05 DIAGNOSIS — E78.2 MIXED HYPERLIPIDEMIA: ICD-10-CM

## 2021-02-05 DIAGNOSIS — I25.10 CORONARY ARTERY DISEASE INVOLVING NATIVE CORONARY ARTERY OF NATIVE HEART WITHOUT ANGINA PECTORIS: Primary | ICD-10-CM

## 2021-02-05 DIAGNOSIS — R56.9 SEIZURE AS LATE EFFECT OF CEREBROVASCULAR ACCIDENT (CVA) (HCC): ICD-10-CM

## 2021-02-05 DIAGNOSIS — I25.10 CORONARY ARTERY DISEASE INVOLVING NATIVE CORONARY ARTERY OF NATIVE HEART WITHOUT ANGINA PECTORIS: ICD-10-CM

## 2021-02-05 DIAGNOSIS — M15.9 PRIMARY OSTEOARTHRITIS INVOLVING MULTIPLE JOINTS: ICD-10-CM

## 2021-02-05 LAB
ALBUMIN SERPL-MCNC: 4.2 G/DL (ref 3.5–5.2)
ALP BLD-CCNC: 81 U/L (ref 40–129)
ALT SERPL-CCNC: 43 U/L (ref 0–40)
ANION GAP SERPL CALCULATED.3IONS-SCNC: 15 MMOL/L (ref 7–16)
AST SERPL-CCNC: 35 U/L (ref 0–39)
BILIRUB SERPL-MCNC: 0.7 MG/DL (ref 0–1.2)
BUN BLDV-MCNC: 13 MG/DL (ref 8–23)
CALCIUM SERPL-MCNC: 9.4 MG/DL (ref 8.6–10.2)
CHLORIDE BLD-SCNC: 95 MMOL/L (ref 98–107)
CHOLESTEROL, TOTAL: 219 MG/DL (ref 0–199)
CO2: 25 MMOL/L (ref 22–29)
CREAT SERPL-MCNC: 0.9 MG/DL (ref 0.7–1.2)
GFR AFRICAN AMERICAN: >60
GFR NON-AFRICAN AMERICAN: >60 ML/MIN/1.73
GLUCOSE BLD-MCNC: 105 MG/DL (ref 74–99)
HBA1C MFR BLD: 5.8 %
HDLC SERPL-MCNC: 64 MG/DL
LDL CHOLESTEROL CALCULATED: 128 MG/DL (ref 0–99)
POTASSIUM SERPL-SCNC: 4.3 MMOL/L (ref 3.5–5)
SODIUM BLD-SCNC: 135 MMOL/L (ref 132–146)
TOTAL PROTEIN: 7.8 G/DL (ref 6.4–8.3)
TRIGL SERPL-MCNC: 137 MG/DL (ref 0–149)
VLDLC SERPL CALC-MCNC: 27 MG/DL

## 2021-02-05 PROCEDURE — 83036 HEMOGLOBIN GLYCOSYLATED A1C: CPT | Performed by: INTERNAL MEDICINE

## 2021-02-05 PROCEDURE — 99215 OFFICE O/P EST HI 40 MIN: CPT | Performed by: INTERNAL MEDICINE

## 2021-02-05 RX ORDER — SILDENAFIL 50 MG/1
50 TABLET, FILM COATED ORAL DAILY PRN
Qty: 10 TABLET | Refills: 5 | Status: SHIPPED | OUTPATIENT
Start: 2021-02-05 | End: 2022-07-18

## 2021-02-05 RX ORDER — OXYCODONE HYDROCHLORIDE AND ACETAMINOPHEN 5; 325 MG/1; MG/1
1 TABLET ORAL EVERY 4 HOURS PRN
COMMUNITY
End: 2021-07-27 | Stop reason: ALTCHOICE

## 2021-02-05 RX ORDER — LISINOPRIL 10 MG/1
10 TABLET ORAL DAILY
Qty: 30 TABLET | Refills: 5 | Status: SHIPPED
Start: 2021-02-05 | End: 2021-02-23 | Stop reason: ALTCHOICE

## 2021-02-05 RX ORDER — PRAVASTATIN SODIUM 10 MG
10 TABLET ORAL NIGHTLY
Qty: 90 TABLET | Refills: 1 | Status: SHIPPED
Start: 2021-02-05 | End: 2021-02-08 | Stop reason: ALTCHOICE

## 2021-02-05 RX ORDER — LEVETIRACETAM 500 MG/1
500 TABLET ORAL 2 TIMES DAILY
Qty: 180 TABLET | Refills: 1 | Status: SHIPPED
Start: 2021-02-05 | End: 2021-06-23 | Stop reason: SDUPTHER

## 2021-02-05 ASSESSMENT — PATIENT HEALTH QUESTIONNAIRE - PHQ9
SUM OF ALL RESPONSES TO PHQ9 QUESTIONS 1 & 2: 0
SUM OF ALL RESPONSES TO PHQ QUESTIONS 1-9: 0
SUM OF ALL RESPONSES TO PHQ QUESTIONS 1-9: 0

## 2021-02-05 NOTE — PROGRESS NOTES
Patient was evaluated by Dr. Arianna Lomas and had a block which was helpful. I did review his MRI and this patient is definitely a surgical candidate. Hemoglobin A1c was 6.4 last time and this will need to be rechecked as he continues to gain weight. His blood pressure is not well controlled. Patient does have a history of coronary disease and seizure disorder and will need evaluations prior to surgery. He has been evaluated by orthopedic surgery Dr. Nicki Sweet and I suggested the patient call him and get his back surgery scheduled. He does see Dr. Lillian Salmeron now for pain management. Patient was recently reevaluated and had another sleep study. Adjustments of his CPAP were made and he is tolerating this well. He finds it to be helpful and he is using it regularly.       Patient Active Problem List   Diagnosis    Hypertension    Primary osteoarthritis involving multiple joints    Hyperlipidemia    Obesity    Bilateral carpal tunnel syndrome    Erectile dysfunction    Sleep apnea    Hypogonadism in male    Osteoarthritis    Pleural effusion    Postoperative atrial fibrillation (HCC)    Iron deficiency anemia due to chronic blood loss    Chronic fatigue    Radiculopathy    Elevated PSA    Hx of lumbosacral spine surgery    Subarachnoid hemorrhage (HCC)    Seizure as late effect of cerebrovascular accident (CVA) ([address]    Acute diverticulitis    CPAP (continuous positive airway pressure) dependence    Chronic back pain    Cardiomegaly    CAD (coronary artery disease)       Allergies   Allergen Reactions    Statins Other (See Comments)     Myalgias        Medications listed as ordered at the time of discharge from hospital   Rosana SAL   Home Medication Instructions ANA LILIA:    Printed on:02/05/21 7600   Medication Information                      aspirin 81 MG chewable tablet  Take 1 tablet by mouth daily             Hydrocortisone Acetate 1 % OINT  Apply 28.4 g topically 2 times daily levETIRAcetam (KEPPRA) 500 MG tablet  Take 1 tablet by mouth 2 times daily             metoprolol succinate (TOPROL XL) 25 MG extended release tablet  Take 1 tablet by mouth daily             oxyCODONE-acetaminophen (PERCOCET) 5-325 MG per tablet  Take 1 tablet by mouth every 4 hours as needed for Pain.             pravastatin (PRAVACHOL) 10 MG tablet  Take 1 tablet by mouth nightly             vitamin C (ASCORBIC ACID) 500 MG tablet  Take 500 mg by mouth daily                    Medications marked \"taking\" at this time  Outpatient Medications Marked as Taking for the 2/5/21 encounter (Office Visit) with Dinorah Tran MD   Medication Sig Dispense Refill    oxyCODONE-acetaminophen (PERCOCET) 5-325 MG per tablet Take 1 tablet by mouth every 4 hours as needed for Pain.  metoprolol succinate (TOPROL XL) 25 MG extended release tablet Take 1 tablet by mouth daily 90 tablet 1    pravastatin (PRAVACHOL) 10 MG tablet Take 1 tablet by mouth nightly 90 tablet 1    levETIRAcetam (KEPPRA) 500 MG tablet Take 1 tablet by mouth 2 times daily 180 tablet 1    vitamin C (ASCORBIC ACID) 500 MG tablet Take 500 mg by mouth daily       aspirin 81 MG chewable tablet Take 1 tablet by mouth daily 30 tablet 3            Vitals:    02/05/21 0928   BP: (!) 160/84   Pulse: 65   Resp: 16   Temp: 98.4 °F (36.9 °C)   SpO2: 97%   Weight: 272 lb (123.4 kg)     Body mass index is 36.89 kg/m². Wt Readings from Last 3 Encounters:   02/05/21 272 lb (123.4 kg)   10/13/20 260 lb (117.9 kg)   10/07/20 262 lb (118.8 kg)     BP Readings from Last 3 Encounters:   02/05/21 (!) 160/84   10/13/20 (!) 142/80   10/07/20 (!) 162/92        Physical Exam:    Tympanic membrane's are clear bilaterally. No anterior or posterior cervical adenopathy. Lungs are clear to auscultation without wheeze rhonchi or rales. Heart is regular without ectopy. Abdomen is obese positive bowel sounds and soft.   Extremities trace to 1+ edema below the mid tibia pravastatin (PRAVACHOL) 10 MG tablet; Take 1 tablet by mouth nightly  -     lisinopril (PRINIVIL;ZESTRIL) 10 MG tablet; Take 1 tablet by mouth daily  -     sildenafil (VIAGRA) 50 MG tablet; Take 1 tablet by mouth daily as needed for Erectile Dysfunction    Patient will have an A1c today. Lab work will be obtained. Referral to cardiology and neurology. Patient is to call Dr. Debbie Figueroa and arrange surgical intervention. Patient is to be seen back in 3 to 4 weeks to assess blood pressure control. Lisinopril is added to his current regimen. Even on repeat his blood pressure was remained elevated. The patient is given sildenafil. He is given warnings regarding this especially with his coronary disease and if he would develop chest pain that he notify ER physician that he had taken his sildenafil. I told him of interaction with nitrate.   Patient is scheduled for Covid vaccination next week

## 2021-02-08 RX ORDER — PRAVASTATIN SODIUM 40 MG
40 TABLET ORAL DAILY
Qty: 30 TABLET | Refills: 5 | Status: SHIPPED
Start: 2021-02-08 | End: 2021-06-23 | Stop reason: SDUPTHER

## 2021-02-09 ENCOUNTER — TELEPHONE (OUTPATIENT)
Dept: FAMILY MEDICINE CLINIC | Age: 71
End: 2021-02-09

## 2021-02-10 ENCOUNTER — TELEPHONE (OUTPATIENT)
Dept: NEUROLOGY | Age: 71
End: 2021-02-10

## 2021-02-10 NOTE — TELEPHONE ENCOUNTER
Medical clearance form faxed back to [facility] Dr Vic Mcnamara office [phone].   Electronically signed by Ambrose Herrera MA on 2/10/21 at 3:21 PM EST

## 2021-02-23 ENCOUNTER — OFFICE VISIT (OUTPATIENT)
Dept: FAMILY MEDICINE CLINIC | Age: 71
End: 2021-02-23
Payer: MEDICARE

## 2021-02-23 VITALS
OXYGEN SATURATION: 97 % | RESPIRATION RATE: 16 BRPM | DIASTOLIC BLOOD PRESSURE: 84 MMHG | TEMPERATURE: 98.7 F | HEART RATE: 63 BPM | SYSTOLIC BLOOD PRESSURE: 138 MMHG | BODY MASS INDEX: 37.03 KG/M2 | WEIGHT: 273 LBS

## 2021-02-23 DIAGNOSIS — I25.10 CORONARY ARTERY DISEASE INVOLVING NATIVE CORONARY ARTERY OF NATIVE HEART WITHOUT ANGINA PECTORIS: ICD-10-CM

## 2021-02-23 DIAGNOSIS — M54.16 LUMBAR RADICULOPATHY: ICD-10-CM

## 2021-02-23 DIAGNOSIS — G47.30 SLEEP APNEA, UNSPECIFIED TYPE: ICD-10-CM

## 2021-02-23 DIAGNOSIS — I10 ESSENTIAL HYPERTENSION: ICD-10-CM

## 2021-02-23 DIAGNOSIS — E74.39 GLUCOSE INTOLERANCE: ICD-10-CM

## 2021-02-23 DIAGNOSIS — E66.01 CLASS 2 SEVERE OBESITY DUE TO EXCESS CALORIES WITH SERIOUS COMORBIDITY AND BODY MASS INDEX (BMI) OF 35.0 TO 35.9 IN ADULT (HCC): ICD-10-CM

## 2021-02-23 DIAGNOSIS — M54.16 LUMBAR RADICULOPATHY: Primary | ICD-10-CM

## 2021-02-23 DIAGNOSIS — E78.2 MIXED HYPERLIPIDEMIA: ICD-10-CM

## 2021-02-23 PROCEDURE — 99215 OFFICE O/P EST HI 40 MIN: CPT | Performed by: INTERNAL MEDICINE

## 2021-02-23 RX ORDER — GABAPENTIN 300 MG/1
300 CAPSULE ORAL 2 TIMES DAILY
COMMUNITY
End: 2022-07-18

## 2021-02-23 RX ORDER — LISINOPRIL 20 MG/1
20 TABLET ORAL DAILY
Qty: 90 TABLET | Refills: 1 | Status: SHIPPED
Start: 2021-02-23 | End: 2021-06-23 | Stop reason: SDUPTHER

## 2021-02-23 NOTE — PROGRESS NOTES
Patient is scheduled for surgery on March 22, 2021 with Dr. Estefanía Austin. Fairly extensive lumbar surgery. Patient does have cardiac evaluation tomorrow. Patient currently is not having any chest pain, chest pressure, shortness of breath nausea or vomiting with exertion. He denies other anginal equivalents. Patient has had no seizure activity. He denies any previous difficulty with intubation. Patient does use a CPAP. Blood pressure initially was little bit elevated and even on repeat was 627 systolic. I did increase his lisinopril dose to 20 mg and expectation of surgery. The patient denies respiratory symptoms such as cough or sputum production. Patient Active Problem List   Diagnosis    Hypertension    Primary osteoarthritis involving multiple joints    Hyperlipidemia    Obesity    Bilateral carpal tunnel syndrome    Erectile dysfunction    Sleep apnea    Hypogonadism in male    Osteoarthritis    Pleural effusion    Postoperative atrial fibrillation (HCC)    Iron deficiency anemia due to chronic blood loss    Chronic fatigue    Radiculopathy    Elevated PSA    Hx of lumbosacral spine surgery    Subarachnoid hemorrhage (HCC)    Seizure as late effect of cerebrovascular accident (CVA) ([facility] [address]    Acute diverticulitis    CPAP (continuous positive airway pressure) dependence    Chronic back pain    Cardiomegaly    CAD (coronary artery disease)    Abnormal finding of blood chemistry, unspecified     Glucose intolerance       Allergies   Allergen Reactions    Statins Other (See Comments)     Myalgias        Medications listed as ordered at the time of discharge from hospital   Abdelrahman SAL   [place] Medication Instructions ANA LILIA:    Printed on:02/23/21 1004   Medication Information                      aspirin 81 MG chewable tablet  Take 1 tablet by mouth daily             gabapentin (NEURONTIN) 300 MG capsule  Take 300 mg by mouth 2 times daily.              Hydrocortisone Acetate 1 % OINT  Apply 28.4 g topically 2 times daily             levETIRAcetam (KEPPRA) 500 MG tablet  Take 1 tablet by mouth 2 times daily             lisinopril (PRINIVIL;ZESTRIL) 20 MG tablet  Take 1 tablet by mouth daily             metoprolol succinate (TOPROL XL) 25 MG extended release tablet  Take 1 tablet by mouth daily             oxyCODONE-acetaminophen (PERCOCET) 5-325 MG per tablet  Take 1 tablet by mouth every 4 hours as needed for Pain.             pravastatin (PRAVACHOL) 40 MG tablet  Take 1 tablet by mouth daily             sildenafil (VIAGRA) 50 MG tablet  Take 1 tablet by mouth daily as needed for Erectile Dysfunction             vitamin C (ASCORBIC ACID) 500 MG tablet  Take 500 mg by mouth daily                    Medications marked \"taking\" at this time  Outpatient Medications Marked as Taking for the 2/23/21 encounter (Office Visit) with Gokul Gallardo MD   Medication Sig Dispense Refill    gabapentin (NEURONTIN) 300 MG capsule Take 300 mg by mouth 2 times daily.  lisinopril (PRINIVIL;ZESTRIL) 20 MG tablet Take 1 tablet by mouth daily 90 tablet 1    pravastatin (PRAVACHOL) 40 MG tablet Take 1 tablet by mouth daily 30 tablet 5    oxyCODONE-acetaminophen (PERCOCET) 5-325 MG per tablet Take 1 tablet by mouth every 4 hours as needed for Pain.  levETIRAcetam (KEPPRA) 500 MG tablet Take 1 tablet by mouth 2 times daily 180 tablet 1    sildenafil (VIAGRA) 50 MG tablet Take 1 tablet by mouth daily as needed for Erectile Dysfunction 10 tablet 5    metoprolol succinate (TOPROL XL) 25 MG extended release tablet Take 1 tablet by mouth daily 90 tablet 1    vitamin C (ASCORBIC ACID) 500 MG tablet Take 500 mg by mouth daily       aspirin 81 MG chewable tablet Take 1 tablet by mouth daily 30 tablet 3            Vitals:    02/23/21 0926   BP: 138/84   Pulse: 63   Resp: 16   Temp: 98.7 °F (37.1 °C)   SpO2: 97%   Weight: 273 lb (123.8 kg)     Body mass index is 37.03 kg/m².    Wt Readings from Last 3 Encounters:   02/23/21 273 lb (123.8 kg)   02/05/21 272 lb (123.4 kg)   10/13/20 260 lb (117.9 kg)     BP Readings from Last 3 Encounters:   02/23/21 138/84   02/05/21 (!) 160/84   10/13/20 (!) 142/80        Physical Exam:    Tympanic membranes are clear bilaterally. No anterior or posterior cervical adenopathy. Lungs are clear to auscultation without rales, rhonchi or wheezes. Heart is regular without murmur, click, gallop or rub. No carotid bruits are detected. Abdomen is obese, positive bowel sounds soft and nontender. No bruits are detected. Extremities are without edematous changes. Some limitation of range of motion of the right shoulder to external rotation. Reasonable range of motion of both hips and knees with flexion and extension. Troy Isabel was seen today for hypertension. Diagnoses and all orders for this visit:    Lumbar radiculopathy  -     Culture, Urine; Future  -     Culture, MRSA, Screening; Future  -     Culture, Nasal; Future    Essential hypertension    Coronary artery disease involving native coronary artery of native heart without angina pectoris    Mixed hyperlipidemia    Sleep apnea, unspecified type    Glucose intolerance    Class 2 severe obesity due to excess calories with serious comorbidity and body mass index (BMI) of 35.0 to 35.9 in adult [facility])    Other orders  -     lisinopril (PRINIVIL;ZESTRIL) 20 MG tablet; Take 1 tablet by mouth daily    Patient just recently had lab work and that will be forwarded to Dr. Nicki Sweet. The patient does have cardiology follow-up tomorrow. I will await their assessment and culture results of both urine and nares. These will all then be forwarded to Dr. Nicki Sweet and medical optimization will be done at that time.

## 2021-02-25 LAB
CULTURE NOSE: NORMAL
MRSA CULTURE ONLY: NORMAL
URINE CULTURE, ROUTINE: NORMAL

## 2021-03-04 ENCOUNTER — TELEPHONE (OUTPATIENT)
Dept: FAMILY MEDICINE CLINIC | Age: 71
End: 2021-03-04

## 2021-03-04 NOTE — TELEPHONE ENCOUNTER
Dr Swetha Travis I called and there was not a stress test ordered. They did an EKG in the office, which I have scanned.

## 2021-03-04 NOTE — TELEPHONE ENCOUNTER
Angelo Ross from Dr. Guille Araujo office called asking for clearance for pt's surgery. Did you want to addend your note from 2/23/21 and we will fax?    F# [phone]

## 2021-03-04 NOTE — TELEPHONE ENCOUNTER
I need the results from the cardiology consultation. Patient was supposed to have stress testing done. Please make sure this is sent to me so that I can then send the approval for surgery.

## 2021-03-05 NOTE — TELEPHONE ENCOUNTER
Called Christina. She said there is no problem with the form, surgery isn't until 3/22.   We can fax directly to her @ [phone]

## 2021-03-05 NOTE — TELEPHONE ENCOUNTER
Please tell them that I left the clearance form at home. I will fax it on Monday.   I apologize for the delay

## 2021-03-11 NOTE — TELEPHONE ENCOUNTER
Faxed clearnace form with office notes of Dr Guillermo Espinal, and cardiology to 3 locations. Christina with Dr Tres Valencia with Dr Fabiano Morales and [facility] PAT department. Fax confirmations included.

## 2021-03-12 ENCOUNTER — HOSPITAL ENCOUNTER (OUTPATIENT)
Age: 71
Discharge: HOME | End: 2021-03-14

## 2021-03-12 LAB
ABO/RH: NORMAL
ANION GAP SERPL CALCULATED.3IONS-SCNC: 11 MMOL/L (ref 7–16)
ANTIBODY SCREEN: NORMAL
BUN BLDV-MCNC: 15 MG/DL (ref 8–23)
CALCIUM SERPL-MCNC: 9.8 MG/DL (ref 8.6–10.2)
CHLORIDE BLD-SCNC: 102 MMOL/L (ref 98–107)
CO2: 25 MMOL/L (ref 22–29)
CREAT SERPL-MCNC: 0.9 MG/DL (ref 0.7–1.2)
GFR AFRICAN AMERICAN: >60
GFR NON-AFRICAN AMERICAN: >60 ML/MIN/1.73
GLUCOSE BLD-MCNC: 87 MG/DL (ref 74–99)
HCT VFR BLD CALC: 50.6 % (ref 37–54)
HEMOGLOBIN: 16.8 G/DL (ref 12.5–16.5)
MCH RBC QN AUTO: 33.6 PG (ref 26–35)
MCHC RBC AUTO-ENTMCNC: 33.2 % (ref 32–34.5)
MCV RBC AUTO: 101.2 FL (ref 80–99.9)
PDW BLD-RTO: 12.3 FL (ref 11.5–15)
PLATELET # BLD: 264 E9/L (ref 130–450)
PMV BLD AUTO: 11.9 FL (ref 7–12)
POTASSIUM SERPL-SCNC: 4.3 MMOL/L (ref 3.5–5)
RBC # BLD: 5 E12/L (ref 3.8–5.8)
SODIUM BLD-SCNC: 138 MMOL/L (ref 132–146)
WBC # BLD: 10.2 E9/L (ref 4.5–11.5)

## 2021-03-12 PROCEDURE — 85027 COMPLETE CBC AUTOMATED: CPT

## 2021-03-12 PROCEDURE — 86850 RBC ANTIBODY SCREEN: CPT

## 2021-03-12 PROCEDURE — 86900 BLOOD TYPING SEROLOGIC ABO: CPT

## 2021-03-12 PROCEDURE — 80048 BASIC METABOLIC PNL TOTAL CA: CPT

## 2021-03-12 PROCEDURE — 86901 BLOOD TYPING SEROLOGIC RH(D): CPT

## 2021-03-22 ENCOUNTER — HOSPITAL ENCOUNTER (OUTPATIENT)
Age: 71
Discharge: HOME | End: 2021-03-24

## 2021-03-22 PROCEDURE — 88311 DECALCIFY TISSUE: CPT

## 2021-03-22 PROCEDURE — 88304 TISSUE EXAM BY PATHOLOGIST: CPT

## 2021-03-23 ENCOUNTER — HOSPITAL ENCOUNTER (OUTPATIENT)
Age: 71
Discharge: HOME | End: 2021-03-25

## 2021-03-23 LAB
ANION GAP SERPL CALCULATED.3IONS-SCNC: 12 MMOL/L (ref 7–16)
BUN BLDV-MCNC: 21 MG/DL (ref 8–23)
CALCIUM SERPL-MCNC: 8.9 MG/DL (ref 8.6–10.2)
CHLORIDE BLD-SCNC: 100 MMOL/L (ref 98–107)
CO2: 23 MMOL/L (ref 22–29)
CREAT SERPL-MCNC: 0.9 MG/DL (ref 0.7–1.2)
GFR AFRICAN AMERICAN: >60
GFR NON-AFRICAN AMERICAN: >60 ML/MIN/1.73
GLUCOSE BLD-MCNC: 144 MG/DL (ref 74–99)
HCT VFR BLD CALC: 42.7 % (ref 37–54)
HEMOGLOBIN: 13.9 G/DL (ref 12.5–16.5)
MCH RBC QN AUTO: 34.1 PG (ref 26–35)
MCHC RBC AUTO-ENTMCNC: 32.6 % (ref 32–34.5)
MCV RBC AUTO: 104.7 FL (ref 80–99.9)
PDW BLD-RTO: 12.5 FL (ref 11.5–15)
PLATELET # BLD: 260 E9/L (ref 130–450)
PMV BLD AUTO: 11.9 FL (ref 7–12)
POTASSIUM SERPL-SCNC: 4.8 MMOL/L (ref 3.5–5)
RBC # BLD: 4.08 E12/L (ref 3.8–5.8)
SODIUM BLD-SCNC: 135 MMOL/L (ref 132–146)
WBC # BLD: 14.5 E9/L (ref 4.5–11.5)

## 2021-03-23 PROCEDURE — 85027 COMPLETE CBC AUTOMATED: CPT

## 2021-03-23 PROCEDURE — 80048 BASIC METABOLIC PNL TOTAL CA: CPT

## 2021-03-24 ENCOUNTER — HOSPITAL ENCOUNTER (OUTPATIENT)
Age: 71
Discharge: HOME | End: 2021-03-26

## 2021-03-24 LAB
ANION GAP SERPL CALCULATED.3IONS-SCNC: 7 MMOL/L (ref 7–16)
BUN BLDV-MCNC: 15 MG/DL (ref 8–23)
CALCIUM SERPL-MCNC: 8.8 MG/DL (ref 8.6–10.2)
CHLORIDE BLD-SCNC: 103 MMOL/L (ref 98–107)
CO2: 27 MMOL/L (ref 22–29)
CREAT SERPL-MCNC: 0.7 MG/DL (ref 0.7–1.2)
GFR AFRICAN AMERICAN: >60
GFR NON-AFRICAN AMERICAN: >60 ML/MIN/1.73
GLUCOSE BLD-MCNC: 123 MG/DL (ref 74–99)
HCT VFR BLD CALC: 36.6 % (ref 37–54)
HEMOGLOBIN: 11.7 G/DL (ref 12.5–16.5)
MCH RBC QN AUTO: 33.6 PG (ref 26–35)
MCHC RBC AUTO-ENTMCNC: 32 % (ref 32–34.5)
MCV RBC AUTO: 105.2 FL (ref 80–99.9)
PDW BLD-RTO: 12.5 FL (ref 11.5–15)
PLATELET # BLD: 205 E9/L (ref 130–450)
PMV BLD AUTO: 11.8 FL (ref 7–12)
POTASSIUM SERPL-SCNC: 4.3 MMOL/L (ref 3.5–5)
RBC # BLD: 3.48 E12/L (ref 3.8–5.8)
SODIUM BLD-SCNC: 137 MMOL/L (ref 132–146)
WBC # BLD: 11.3 E9/L (ref 4.5–11.5)

## 2021-03-24 PROCEDURE — 85027 COMPLETE CBC AUTOMATED: CPT

## 2021-03-24 PROCEDURE — 80048 BASIC METABOLIC PNL TOTAL CA: CPT

## 2021-04-21 NOTE — PROGRESS NOTES
Problem: Falls - Risk of:  Goal: Will remain free from falls  Description: Will remain free from falls  4/20/2021 2240 by Elise Hoyos RN  Outcome: Ongoing  Note: Patient has not fallen this shift. Call light and possessions within reach, bed alarm on.  4/20/2021 1235 by Grisel Gallo RN  Outcome: Ongoing  Note: Patient bed in lowest position, wheels locked, 2/4 side rails up and alarm on. Call light and belongings within reach. Pathway clear. Nonskid footwear on. Patient rounded on hourly. Fall risk due to weakness and pain medication. Extensive 1 assist with walker. Fall risk assessment complete. Patient remains free from falls this shift, will continue to monitor. Goal: Absence of physical injury  Description: Absence of physical injury  Outcome: Ongoing     Problem: Skin Integrity:  Goal: Will show no infection signs and symptoms  Description: Will show no infection signs and symptoms  4/20/2021 2240 by Elise Hoyos RN  Outcome: Ongoing  4/20/2021 1235 by Grisel Gallo RN  Outcome: Ongoing  Note: Patient repositions every 2 hours. Heels elevated off of bed. Skin kept clean and dry. Skin assessed with every head to toe. Umesh scale complete. Patient has stage 2 and DTI present on buttocks/ coccyx. EPC cream applied. Goal: Absence of new skin breakdown  Description: Absence of new skin breakdown  Outcome: Ongoing  Note: No new skin breakdown noted this shift. Patient turned q2h and repositioned with pillow support. Problem: DISCHARGE BARRIERS  Goal: Patient's continuum of care needs are met  4/20/2021 2240 by Elise Hoyos RN  Outcome: Ongoing  Note: Patient plans to D/C to ECF. Patient is okay with [place] and the son [facility]) would like her to go to another facility. [facility] following case. 4/20/2021 1235 by Grisel Gallo RN  Outcome: Ongoing  Note: Patient plans to go to ECF. Son is POA. SW on case and planning for DC to the [facility] but need to confirm with son.  Discharge planning Subjective:     Chief Complaint   Patient presents with    Post-Op Check     cabgx3 12/23       Patient ID: Alistair Pavon is a 72 y.o. male who presents to office for routine 1 month follow up s/p CABG x 3 on 12/23/19. Patient states he is doing well and denies any CP, SOB or incisional problems     HPI    Review of Systems   Constitutional: Negative for chills and fever. Respiratory: Negative for cough and wheezing. Cardiovascular: Negative for chest pain, palpitations and leg swelling. Neurological: Negative for syncope and light-headedness. Objective:   Physical Exam  Constitutional:       Appearance: Normal appearance. Neck:      Musculoskeletal: Normal range of motion and neck supple. Cardiovascular:      Rate and Rhythm: Normal rate and regular rhythm. Pulmonary:      Effort: Pulmonary effort is normal.      Breath sounds: Normal breath sounds. Comments: midsternal and chest tube incisions well healed without evidence of infection. Sternum stable. Abdominal:      General: Bowel sounds are normal.   Musculoskeletal: Normal range of motion. Comments: Endoscopic vein harvest incisions intact without evidence of infection     Neurological:      Mental Status: He is alert and oriented to person, place, and time. Deep Tendon Reflexes: Reflexes normal (.planoff). Assessment:      S/p CABG       Plan:      Remove sternal precautions after 6 weeks   Patient may drive after 6 weeks post op   Cardiac rehab referral  Continue follow up with PCP, Cardiology as scheduled. Encouraged to call office with any questions, concerns. Otherwise no further follow up necessary from CTS standpoint.               Gayathri Wang MD ongoing. Problem: Urinary Retention:  Goal: Urinary elimination within specified parameters  Description: Urinary elimination within specified parameters  4/20/2021 2240 by Vignesh Ren RN  Outcome: Ongoing  Note: Garcia catheter in place per urology. Urine draining appropriately this shift. Garcia care performed this shift. 4/20/2021 1235 by Jacqueline Gold RN  Outcome: Ongoing  Note: Garcia catheter in place per urology. Yellow, clear urine draining. Garcia care preformed this shift. Goal: Able to perform urinary catheter care  Description: Able to perform urinary catheter care  Outcome: Ongoing  Goal: Able to perform urinary self-catheterization  Description: Able to perform urinary self-catheterization  Outcome: Ongoing  Goal: Ability to reestablish a normal urinary elimination pattern will improve - after catheter removal  Description: Ability to reestablish a normal urinary elimination pattern will improve - after catheter removal  Outcome: Ongoing  Goal: Ability to recognize the need to void and respond appropriately will improve  Description: Ability to recognize the need to void and respond appropriately will improve  Outcome: Ongoing  Goal: Absence of postvoid residual urine  Description: Absence of postvoid residual urine  Outcome: Ongoing     Problem: Fluid Volume:  Goal: Will show no signs or symptoms of fluid imbalance  Description: Will show no signs or symptoms of fluid imbalance  4/20/2021 2240 by Vignesh Ren RN  Outcome: Ongoing  Note: Patient having adequate intake and output this shift. 4/20/2021 1235 by Jacqueline Gold RN  Outcome: Ongoing  Note: BAILEE and UTI present. On rocephin. Taking oral sodium bicarb. Problem: Nutritional:  Goal: Maintenance of adequate nutrition will be supported  Description: Maintenance of adequate nutrition will be supported  4/20/2021 2240 by Vignesh Ren RN  Outcome: Ongoing  Note: Patient tolerating diet fairly well.  Patient was seen by speech found in left leg. Left leg has weak pulses and is warm to touch, kaiden and swollen. Leg kept elevated. HR and respirations remain WDL. ON continuous cardiac monitor. Problem: Nutrition  Goal: Optimal nutrition therapy  4/20/2021 2240 by Theron Everett RN  Outcome: Ongoing  4/20/2021 1548 by Fam Wyatt RD, LD  Outcome: Ongoing     Care plan reviewed with patient. Patient verbalize understanding of the plan of care and contribute to goal setting.

## 2021-05-20 NOTE — TELEPHONE ENCOUNTER
Last Appointment:  2/23/2021  Future Appointments   Date Time Provider John Barger   6/23/2021 10:30 AM Radha Gil  [place] calling in he is out of metoprolol. Please send to [facility].

## 2021-05-21 RX ORDER — METOPROLOL SUCCINATE 25 MG/1
25 TABLET, EXTENDED RELEASE ORAL DAILY
Qty: 30 TABLET | Refills: 1 | Status: SHIPPED
Start: 2021-05-21 | End: 2021-06-23 | Stop reason: SDUPTHER

## 2021-06-23 ENCOUNTER — OFFICE VISIT (OUTPATIENT)
Dept: FAMILY MEDICINE CLINIC | Age: 71
End: 2021-06-23
Payer: MEDICARE

## 2021-06-23 VITALS
BODY MASS INDEX: 35.94 KG/M2 | WEIGHT: 265 LBS | SYSTOLIC BLOOD PRESSURE: 140 MMHG | TEMPERATURE: 98.1 F | HEART RATE: 66 BPM | DIASTOLIC BLOOD PRESSURE: 90 MMHG | OXYGEN SATURATION: 98 %

## 2021-06-23 DIAGNOSIS — E78.2 MIXED HYPERLIPIDEMIA: ICD-10-CM

## 2021-06-23 DIAGNOSIS — I25.10 CORONARY ARTERY DISEASE INVOLVING NATIVE CORONARY ARTERY OF NATIVE HEART WITHOUT ANGINA PECTORIS: ICD-10-CM

## 2021-06-23 DIAGNOSIS — I69.398 SEIZURE AS LATE EFFECT OF CEREBROVASCULAR ACCIDENT (CVA) (HCC): ICD-10-CM

## 2021-06-23 DIAGNOSIS — G47.30 SLEEP APNEA, UNSPECIFIED TYPE: ICD-10-CM

## 2021-06-23 DIAGNOSIS — Z12.5 PROSTATE CANCER SCREENING: ICD-10-CM

## 2021-06-23 DIAGNOSIS — E66.01 CLASS 2 SEVERE OBESITY DUE TO EXCESS CALORIES WITH SERIOUS COMORBIDITY AND BODY MASS INDEX (BMI) OF 35.0 TO 35.9 IN ADULT (HCC): ICD-10-CM

## 2021-06-23 DIAGNOSIS — I10 ESSENTIAL HYPERTENSION: Primary | ICD-10-CM

## 2021-06-23 DIAGNOSIS — R79.9 ABNORMAL FINDING OF BLOOD CHEMISTRY, UNSPECIFIED: ICD-10-CM

## 2021-06-23 DIAGNOSIS — E74.39 GLUCOSE INTOLERANCE: ICD-10-CM

## 2021-06-23 DIAGNOSIS — M15.9 PRIMARY OSTEOARTHRITIS INVOLVING MULTIPLE JOINTS: ICD-10-CM

## 2021-06-23 DIAGNOSIS — R56.9 SEIZURE AS LATE EFFECT OF CEREBROVASCULAR ACCIDENT (CVA) (HCC): ICD-10-CM

## 2021-06-23 LAB
ALBUMIN SERPL-MCNC: 4.2 G/DL (ref 3.5–5.2)
ALP BLD-CCNC: 82 U/L (ref 40–129)
ALT SERPL-CCNC: 44 U/L (ref 0–40)
ANION GAP SERPL CALCULATED.3IONS-SCNC: 17 MMOL/L (ref 7–16)
AST SERPL-CCNC: 55 U/L (ref 0–39)
BASOPHILS ABSOLUTE: 0.07 E9/L (ref 0–0.2)
BASOPHILS RELATIVE PERCENT: 0.9 % (ref 0–2)
BILIRUB SERPL-MCNC: 0.3 MG/DL (ref 0–1.2)
BUN BLDV-MCNC: 14 MG/DL (ref 6–23)
CALCIUM SERPL-MCNC: 9.9 MG/DL (ref 8.6–10.2)
CHLORIDE BLD-SCNC: 101 MMOL/L (ref 98–107)
CHOLESTEROL, TOTAL: 226 MG/DL (ref 0–199)
CO2: 22 MMOL/L (ref 22–29)
CREAT SERPL-MCNC: 0.8 MG/DL (ref 0.7–1.2)
EOSINOPHILS ABSOLUTE: 0.47 E9/L (ref 0.05–0.5)
EOSINOPHILS RELATIVE PERCENT: 5.9 % (ref 0–6)
GFR AFRICAN AMERICAN: >60
GFR NON-AFRICAN AMERICAN: >60 ML/MIN/1.73
GLUCOSE BLD-MCNC: 94 MG/DL (ref 74–99)
HBA1C MFR BLD: 6.1 %
HCT VFR BLD CALC: 50.4 % (ref 37–54)
HDLC SERPL-MCNC: 57 MG/DL
HEMOGLOBIN: 16.2 G/DL (ref 12.5–16.5)
IMMATURE GRANULOCYTES #: 0.05 E9/L
IMMATURE GRANULOCYTES %: 0.6 % (ref 0–5)
LDL CHOLESTEROL CALCULATED: 145 MG/DL (ref 0–99)
LYMPHOCYTES ABSOLUTE: 1.63 E9/L (ref 1.5–4)
LYMPHOCYTES RELATIVE PERCENT: 20.4 % (ref 20–42)
MCH RBC QN AUTO: 33.1 PG (ref 26–35)
MCHC RBC AUTO-ENTMCNC: 32.1 % (ref 32–34.5)
MCV RBC AUTO: 103.1 FL (ref 80–99.9)
MONOCYTES ABSOLUTE: 0.87 E9/L (ref 0.1–0.95)
MONOCYTES RELATIVE PERCENT: 10.9 % (ref 2–12)
NEUTROPHILS ABSOLUTE: 4.89 E9/L (ref 1.8–7.3)
NEUTROPHILS RELATIVE PERCENT: 61.3 % (ref 43–80)
PDW BLD-RTO: 12.6 FL (ref 11.5–15)
PLATELET # BLD: 268 E9/L (ref 130–450)
PMV BLD AUTO: 11.7 FL (ref 7–12)
POTASSIUM SERPL-SCNC: 4.8 MMOL/L (ref 3.5–5)
PROSTATE SPECIFIC ANTIGEN: 4.36 NG/ML (ref 0–4)
RBC # BLD: 4.89 E12/L (ref 3.8–5.8)
SODIUM BLD-SCNC: 140 MMOL/L (ref 132–146)
TOTAL PROTEIN: 8.3 G/DL (ref 6.4–8.3)
TRIGL SERPL-MCNC: 118 MG/DL (ref 0–149)
VLDLC SERPL CALC-MCNC: 24 MG/DL
WBC # BLD: 8 E9/L (ref 4.5–11.5)

## 2021-06-23 PROCEDURE — 99215 OFFICE O/P EST HI 40 MIN: CPT | Performed by: INTERNAL MEDICINE

## 2021-06-23 PROCEDURE — 83036 HEMOGLOBIN GLYCOSYLATED A1C: CPT | Performed by: INTERNAL MEDICINE

## 2021-06-23 RX ORDER — LEVETIRACETAM 500 MG/1
500 TABLET ORAL 2 TIMES DAILY
Qty: 180 TABLET | Refills: 1 | Status: SHIPPED
Start: 2021-06-23 | End: 2021-12-22 | Stop reason: SDUPTHER

## 2021-06-23 RX ORDER — LISINOPRIL 20 MG/1
20 TABLET ORAL DAILY
Qty: 90 TABLET | Refills: 1 | Status: SHIPPED
Start: 2021-06-23 | End: 2021-12-22 | Stop reason: SDUPTHER

## 2021-06-23 RX ORDER — METOPROLOL SUCCINATE 25 MG/1
25 TABLET, EXTENDED RELEASE ORAL DAILY
Qty: 90 TABLET | Refills: 1 | Status: SHIPPED
Start: 2021-06-23 | End: 2021-12-22 | Stop reason: SDUPTHER

## 2021-06-23 RX ORDER — PRAVASTATIN SODIUM 40 MG
40 TABLET ORAL DAILY
Qty: 90 TABLET | Refills: 1 | Status: SHIPPED
Start: 2021-06-23 | End: 2021-12-22 | Stop reason: SDUPTHER

## 2021-06-23 NOTE — PROGRESS NOTES
19  Dacia Gamino : 1950 Sex: male  Age: 80 y.o. Chief Complaint   Patient presents with    Other     4 months       Patient did fairly well in terms of his lumbar surgery. Radicular symptoms are much improved. He is still having some degree of back discomfort. He is trying to wean his Percocet. Patient has not had chest pain or chest pressure or palpitations. He has been evaluated by sleep physician for his sleep apnea. This seems to be very beneficial and he has been compliant with this. Patient is due for prostate exam today. Colonoscopy was completed in . The patient does have diverticular disease but he has not had symptoms of diverticulitis. Arthritic symptoms seem to be relatively stable at this point. He is trying to be more active. He is denying symptoms of claudication to the lower extremity. Hypertension    Hyperlipidemia    Other        Review of Systems  Health Maintenance:  Colonoscopy - (2020) Anayeli diverticular dx  Colonoscopy Screening - (2017)  Couseled on Home Safety - (5/10/2017)  Physical Exam - (2021)  Prostate Exam - (2021)  Psa Test - (2021)  Rectal Exam - (2021)  EKG  Colonoscopy - (2017) Hien Irizarry  EGD - () VITO  Sleep Study - (2014)   Influenza Vaccination - ()  Prevnar Vaccine - (2018) SLIP GIVEN  Shingrix Vaccine (Shingles) - (2018) León Farley 89   1. Obesity. 2. Hyperlipidemia. BEGAN SIMVISTATIN 2/1/10  3. HYPERTENSION BEGAN LISINOPRIL/HCT 2/1/10  4. Erectile dysfunction. 5. History of basal cell carcinoma. 6. History of previous sebaceous cyst.  7. OA HAD BILATERAL KNEE REPLACEMENTS  8. CHRONIC BACK PAIN- TASH EXERCISES RECOMMENDED 3/11  9. ESOPHAGITIS/PUD-  VITO  10. FEVER/DIARRHEA/RASH - PROBABLY VIRAL 12 ADMITTED TO [facility]- SEE REPORTS  11. HYPOGONADISM- MRI ORDERED 12  12. SNORING-FATIGUE- SLEEP APNEA- SLEEP STUDY ORDERED 10/13-CPAP BEGUN  13.  LUMBAR RADICULOPATHY-8/17/15-PHILLIP/DONNY-SURGERY SCHEDULED 17  14. PNEUMONIA- 5/10/16-CXR CLEARED  15. CARDIOMEGALY-16-NORMAL ECHO 16 and  16. LOW BACK PAIN- REFERRED TO [facility] 18  17. OBESITY- GLP1 DISCUSSED 10/31/18  18. CHF-- referred to [facility]. Bilateral Carpal Tunnel- wrist splint   20. Abnormal stres- cath planned 12/10/2019  21. CVA-   Surgical Hx:  Knee Replacement - () BILATERAL-  1. He had right shoulder surgery in .  2. He had a vasectomy at age 33.  1. He had bilateral knee replacements in . 4. Sebaceous cyst removal by Dr. Lakeshia Downey in . 5. Basal cell carcinoma by Dr. Amber Mendoza in . 6. LUMBAR FUSION 3/2021  Reviewed and updated. FH:  Father:  Cerebrovascular Accident (CVA) - age 80. Mother:  Leukemia -  in her 29's. Reviewed, no changes. SH:  Marital: . Personal Habits: Cigarette Use: Former Cigarette Smoker 1 Pack Daily - for 30 years Negative For  current cigarette smoker. Alcohol: Occasionally consumes alcohol. Daily Caffeine: Consumes on  average 4 cups of hot tea per day - more recently 2 cups. Exercise Type: Walks sporadically. Reviewed, no changes. Date: 12/3/2019  Was the patient queried about smoking behavior? Yes   Does the patient currently smoke? Smoking: Patient is a former smoker. Current Outpatient Medications:     levETIRAcetam (KEPPRA) 500 MG tablet, Take 1 tablet by mouth 2 times daily, Disp: 180 tablet, Rfl: 1    metoprolol succinate (TOPROL XL) 25 MG extended release tablet, Take 1 tablet by mouth daily, Disp: 90 tablet, Rfl: 1    pravastatin (PRAVACHOL) 40 MG tablet, Take 1 tablet by mouth daily, Disp: 90 tablet, Rfl: 1    lisinopril (PRINIVIL;ZESTRIL) 20 MG tablet, Take 1 tablet by mouth daily, Disp: 90 tablet, Rfl: 1    gabapentin (NEURONTIN) 300 MG capsule, Take 300 mg by mouth 2 times daily. , Disp: , Rfl:     oxyCODONE-acetaminophen (PERCOCET) 5-325 MG per tablet, Take 1 tablet by mouth every 4 hours Stroke Father     Stroke Sister     No Known Problems Brother     Stroke Sister     No Known Problems Sister     No Known Problems Sister     No Known Problems Sister      Social History     Socioeconomic History    Marital status:      Spouse name: Not on file    Number of children: 2    Years of education: Not on file    Highest education level: Not on file   Occupational History    Not on file   Tobacco Use    Smoking status: Former Smoker     Packs/day: 1.50     Years: 31.00     Pack years: 46.50     Types: Cigarettes     Start date: 1966     Quit date:      Years since quittin.4    Smokeless tobacco: Never Used   Vaping Use    Vaping Use: Never used   Substance and Sexual Activity    Alcohol use: Yes     Alcohol/week: 14.0 standard drinks     Types: 14 Cans of beer per week     Comment: drinks daily during summer months    Drug use: No    Sexual activity: Yes     Partners: Female   Other Topics Concern    Not on file   Social History Narrative    Not on file     Social Determinants of Health     Financial Resource Strain:     Difficulty of Paying Living Expenses:    Food Insecurity:     Worried About Running Out of Food in the Last Year:     [address] in the Last Year:    Transportation Needs:     Lack of Transportation (Medical):      Lack of Transportation (Non-Medical):    Physical Activity:     Days of Exercise per Week:     Minutes of Exercise per Session:    Stress:     Feeling of Stress :    Social Connections:     Frequency of Communication with Friends and Family:     Frequency of Social Gatherings with Friends and Family:     Attends Protestant Services:     Active Member of Clubs or Organizations:     Attends Club or Organization Meetings:     Marital Status:    Intimate Partner Violence:     Fear of Current or Ex-Partner:     Emotionally Abused:     Physically Abused:     Sexually Abused:        Vitals:    21 1027   BP: (!) 140/90 type    Glucose intolerance  -     POCT glycosylated hemoglobin (Hb A1C)    Prostate cancer screening  -     Lipid Panel; Future  -     CBC Auto Differential; Future  -     PSA SCREENING; Future    Coronary artery disease involving native coronary artery of native heart without angina pectoris  -     Comprehensive Metabolic Panel; Future    Mixed hyperlipidemia  -     Comprehensive Metabolic Panel; Future  -     Lipid Panel; Future    Class 2 severe obesity due to excess calories with serious comorbidity and body mass index (BMI) of 35.0 to 35.9 in adult [facility])    Abnormal finding of blood chemistry, unspecified   -     CBC Auto Differential; Future  -     POCT glycosylated hemoglobin (Hb A1C)    Other orders  -     levETIRAcetam (KEPPRA) 500 MG tablet; Take 1 tablet by mouth 2 times daily  -     metoprolol succinate (TOPROL XL) 25 MG extended release tablet; Take 1 tablet by mouth daily  -     pravastatin (PRAVACHOL) 40 MG tablet; Take 1 tablet by mouth daily  -     lisinopril (PRINIVIL;ZESTRIL) 20 MG tablet; Take 1 tablet by mouth daily    This is a complete examination. The patient is to be seen back in 6 months. His hemoglobin A1c was 6.1 today. Weight loss and increase activity are encouraged. I will let him know the results of this testing.

## 2021-06-23 NOTE — PROGRESS NOTES
OINT  Apply 28.4 g topically 2 times daily             levETIRAcetam (KEPPRA) 500 MG tablet  Take 1 tablet by mouth 2 times daily             lisinopril (PRINIVIL;ZESTRIL) 20 MG tablet  Take 1 tablet by mouth daily             metoprolol succinate (TOPROL XL) 25 MG extended release tablet  Take 1 tablet by mouth daily             oxyCODONE-acetaminophen (PERCOCET) 5-325 MG per tablet  Take 1 tablet by mouth every 4 hours as needed for Pain.             pravastatin (PRAVACHOL) 40 MG tablet  Take 1 tablet by mouth daily             sildenafil (VIAGRA) 50 MG tablet  Take 1 tablet by mouth daily as needed for Erectile Dysfunction             vitamin C (ASCORBIC ACID) 500 MG tablet  Take 500 mg by mouth daily                    Medications marked \"taking\" at this time  Outpatient Medications Marked as Taking for the 6/23/21 encounter (Office Visit) with Yu Sánchez MD   Medication Sig Dispense Refill    metoprolol succinate (TOPROL XL) 25 MG extended release tablet Take 1 tablet by mouth daily 30 tablet 1    gabapentin (NEURONTIN) 300 MG capsule Take 300 mg by mouth 2 times daily.  lisinopril (PRINIVIL;ZESTRIL) 20 MG tablet Take 1 tablet by mouth daily 90 tablet 1    pravastatin (PRAVACHOL) 40 MG tablet Take 1 tablet by mouth daily 30 tablet 5    oxyCODONE-acetaminophen (PERCOCET) 5-325 MG per tablet Take 1 tablet by mouth every 4 hours as needed for Pain.  levETIRAcetam (KEPPRA) 500 MG tablet Take 1 tablet by mouth 2 times daily 180 tablet 1    sildenafil (VIAGRA) 50 MG tablet Take 1 tablet by mouth daily as needed for Erectile Dysfunction 10 tablet 5    vitamin C (ASCORBIC ACID) 500 MG tablet Take 500 mg by mouth daily       aspirin 81 MG chewable tablet Take 1 tablet by mouth daily 30 tablet 3            Vitals:    06/23/21 1027   BP: (!) 140/90   Pulse: 66   Temp: 98.1 °F (36.7 °C)   SpO2: 98%   Weight: 265 lb (120.2 kg)     Body mass index is 35.94 kg/m².    Wt Readings from Last 3 Encounters: 06/23/21 265 lb (120.2 kg)   02/23/21 273 lb (123.8 kg)   02/05/21 272 lb (123.4 kg)     BP Readings from Last 3 Encounters:   06/23/21 (!) 140/90   02/23/21 138/84   02/05/21 (!) 160/84        Physical Exam:    Tympanic membranes are clear bilaterally. No anterior or posterior cervical adenopathy. Lungs are clear to auscultation without rales, rhonchi or wheezes. Heart is regular without murmur, click, gallop or rub. No carotid bruits are detected. Abdomen is obese, positive bowel sounds soft and nontender. No bruits are detected. Extremities are without edematous changes. Some limitation of range of motion of the right shoulder to external rotation. Reasonable range of motion of both hips and knees with flexion and extension. There are no diagnoses linked to this encounter. Patient just recently had lab work and that will be forwarded to Dr. Kimberly Cruz. The patient does have cardiology follow-up tomorrow. I will await their assessment and culture results of both urine and nares. These will all then be forwarded to Dr. Kimberly Cruz and medical optimization will be done at that time.

## 2021-07-21 ENCOUNTER — TELEPHONE (OUTPATIENT)
Dept: NEUROLOGY | Age: 71
End: 2021-07-21

## 2021-07-21 ENCOUNTER — TELEPHONE (OUTPATIENT)
Dept: FAMILY MEDICINE CLINIC | Age: 71
End: 2021-07-21

## 2021-07-21 NOTE — TELEPHONE ENCOUNTER
Patient last saw ZEE Mena 10/13/2020 and canceled in March - states no seizures since then and taking Keppra 500 mg BID. He is questioning if he is to continue the medication since being seizure free. Please Advise.   Electronically signed by Fede Figueroa on 7/21/21 at 9:51 AM EDT

## 2021-07-21 NOTE — TELEPHONE ENCOUNTER
Called patient. He wants to drop a paper off to you for an ODOT physical to see if you will sign this. He states that work med wouldn't sign it because of his seizure medicine and he thinks the pain pills. He wants to drop the form off in [place] tomorrow so you can see it.

## 2021-07-21 NOTE — TELEPHONE ENCOUNTER
I can take a look at the form but he may need a neurologist sinus stating he is seizure-free for at least 6 months.

## 2021-07-21 NOTE — TELEPHONE ENCOUNTER
----- Message from Emely Hernandez sent at 7/21/2021  9:10 AM EDT -----  Subject: Message to Provider    QUESTIONS  Information for Provider? Patient called in and want to know if he can   either drop off paper work or do he needs to make a appointment the paper   work is for work it needs a list of his medications and also the dosages   if someone can let the pt know which one to do.  ---------------------------------------------------------------------------  --------------  [address]  What is the best way for the office to contact you? OK to leave message on   voicemail  Preferred Call Back Phone Number? [phone]  ---------------------------------------------------------------------------  --------------  SCRIPT ANSWERS  Relationship to Patient?  Self

## 2021-07-22 ENCOUNTER — TELEPHONE (OUTPATIENT)
Dept: NEUROLOGY | Age: 71
End: 2021-07-22

## 2021-07-22 NOTE — TELEPHONE ENCOUNTER
Patient states he does not see a Neurologist as they released him from their care. He has not had a seizure since 2019. He wanted me to make sure that you know it's not for . 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

## 2021-07-22 NOTE — LETTER
[facility]  [address] [place] [address],  [address]  [address] [place] [address]  Phone: [phone]  Fax: [phone]    DESTINEE Beckham CNP        July 22, 2021      To Whom it May Concern    Bouchra Serrano is a patient in our office for seizures. He has been seizure free for over 6 months on anti-seizure medication, and has been cleared to drive from our standpoint. If you have any questions, do not hesitate to call our office.       Sincerely,        DESTINEE Beckham CNP

## 2021-07-22 NOTE — TELEPHONE ENCOUNTER
Patient called requesting a letter from Home [facility] NP stating that he has been seizure free for the past 6 months. 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 Pt wants the letter faxed to His PCP -Dr Sarita Boles @ [phone].

## 2021-07-23 PROBLEM — Z12.5 PROSTATE CANCER SCREENING: Status: RESOLVED | Noted: 2021-06-23 | Resolved: 2021-07-23

## 2021-07-26 ENCOUNTER — TELEPHONE (OUTPATIENT)
Dept: FAMILY MEDICINE CLINIC | Age: 71
End: 2021-07-26

## 2021-07-26 NOTE — TELEPHONE ENCOUNTER
I need to see this patient and discussed this driving permit. It does not need to be a visit but I would like to speak to him face-to-face sometime this week. Sometime late on Tuesday or Wednesday would be fine.

## 2021-07-26 NOTE — TELEPHONE ENCOUNTER
Patient is calling to check on forms he requested to be filled out and to make sure you got letter from neurologist

## 2021-07-27 ENCOUNTER — OFFICE VISIT (OUTPATIENT)
Dept: FAMILY MEDICINE CLINIC | Age: 71
End: 2021-07-27
Payer: MEDICARE

## 2021-07-27 VITALS
WEIGHT: 266 LBS | SYSTOLIC BLOOD PRESSURE: 140 MMHG | BODY MASS INDEX: 36.08 KG/M2 | DIASTOLIC BLOOD PRESSURE: 80 MMHG | HEART RATE: 83 BPM | OXYGEN SATURATION: 97 % | TEMPERATURE: 98.5 F

## 2021-07-27 DIAGNOSIS — Z86.69 HX OF SEIZURE DISORDER: ICD-10-CM

## 2021-07-27 DIAGNOSIS — I25.10 CORONARY ARTERY DISEASE INVOLVING NATIVE CORONARY ARTERY OF NATIVE HEART WITHOUT ANGINA PECTORIS: Primary | ICD-10-CM

## 2021-07-27 DIAGNOSIS — I10 ESSENTIAL HYPERTENSION: ICD-10-CM

## 2021-07-27 PROCEDURE — 99213 OFFICE O/P EST LOW 20 MIN: CPT | Performed by: INTERNAL MEDICINE

## 2021-07-27 NOTE — PROGRESS NOTES
CARDIOMEGALY-16-NORMAL ECHO 16 and  16. LOW BACK PAIN- REFERRED TO [facility] 18  17. OBESITY- GLP1 DISCUSSED 10/31/18  18. CHF-- referred to [facility]. Bilateral Carpal Tunnel- wrist splint   20. Abnormal stres- cath planned 12/10/2019  21. CVA-   Surgical Hx:  Knee Replacement - () BILATERAL-  1. He had right shoulder surgery in .  2. He had a vasectomy at age 33.  1. He had bilateral knee replacements in . 4. Sebaceous cyst removal by Dr. Rosemary Sullivan in . 5. Basal cell carcinoma by Dr. Santhosh Thrasher in . 6. LUMBAR FUSION 3/2021  Reviewed and updated. FH:  Father:  Cerebrovascular Accident (CVA) - age 80. Mother:  Leukemia -  in her 29's. Reviewed, no changes. SH:  Marital: . Personal Habits: Cigarette Use: Former Cigarette Smoker 1 Pack Daily - for 30 years Negative For  current cigarette smoker. Alcohol: Occasionally consumes alcohol. Daily Caffeine: Consumes on  average 4 cups of hot tea per day - more recently 2 cups. Exercise Type: Walks sporadically. Reviewed, no changes. Date: 12/3/2019  Was the patient queried about smoking behavior? Yes   Does the patient currently smoke? Smoking: Patient is a former smoker. Current Outpatient Medications:     levETIRAcetam (KEPPRA) 500 MG tablet, Take 1 tablet by mouth 2 times daily, Disp: 180 tablet, Rfl: 1    metoprolol succinate (TOPROL XL) 25 MG extended release tablet, Take 1 tablet by mouth daily, Disp: 90 tablet, Rfl: 1    pravastatin (PRAVACHOL) 40 MG tablet, Take 1 tablet by mouth daily, Disp: 90 tablet, Rfl: 1    lisinopril (PRINIVIL;ZESTRIL) 20 MG tablet, Take 1 tablet by mouth daily, Disp: 90 tablet, Rfl: 1    gabapentin (NEURONTIN) 300 MG capsule, Take 300 mg by mouth 2 times daily. , Disp: , Rfl:     sildenafil (VIAGRA) 50 MG tablet, Take 1 tablet by mouth daily as needed for Erectile Dysfunction, Disp: 10 tablet, Rfl: 5    vitamin C (ASCORBIC ACID) 500 MG tablet, Take 500 mg by mouth daily , Disp: , Rfl:     aspirin 81 MG chewable tablet, Take 1 tablet by mouth daily, Disp: 30 tablet, Rfl: 3    Hydrocortisone Acetate 1 % OINT, Apply 28.4 g topically 2 times daily (Patient not taking: Reported on 10/13/2020), Disp: 1 Tube, Rfl: 1  Allergies   Allergen Reactions    Statins Other (See Comments)     Myalgias        Past Medical History:   Diagnosis Date    Basal cell carcinoma     CAD (coronary artery disease) 11/2019    Cardiomegaly 05/17/2016    Chronic back pain     Chronic back pain     CPAP (continuous positive airway pressure) dependence     Erectile dysfunction     Erectile dysfunction     Esophagitis 09/2011    VITO    H/O cardiomegaly     NORMAL ECHO IN 2016    H/O hypogonadism 2012    History of basal cell carcinoma     History of pneumonia 2016    History of sebaceous cyst     Hyperlipidemia     Hypertension     Hypogonadism in male 12/18/2012    Lumbar radiculopathy 08/17/2015    Obesity     Obesity     Osteoarthritis     Postoperative atrial fibrillation ([facility] [place] [address] 12/24/2019    Sleep apnea      Past Surgical History:   Procedure Laterality Date    CARDIAC CATHETERIZATION  12/10/2019    scavina  CT surgery consult    CORONARY ARTERY BYPASS GRAFT N/A 12/23/2019    CABG CORONARY ARTERY BYPASS, SHANTELL performed by Syed Bell MD at [address]  [address]    [address] [place] [address] ESOPHGOSCOPY  9/10/2011    foreign body removal    JOINT REPLACEMENT Bilateral 2004    knees    KNEE SURGERY  2004    bilateral knee replacements    SHOULDER SURGERY  1997    right rotator cuff    SHOULDER SURGERY Right 1997    SKIN CANCER EXCISION  2003    CUDDAPAH    VASECTOMY      AGE 33     Family History   Problem Relation Age of Onset    Cancer Mother     Stroke Father     Stroke Sister     No Known Problems Brother     Stroke Sister     No Known Problems Sister     No Known Problems Sister     No Known Problems Sister      Social History     Socioeconomic History    Marital status:      Spouse name: Not on file    Number of children: 2    Years of education: Not on file    Highest education level: Not on file   Occupational History    Not on file   Tobacco Use    Smoking status: Former Smoker     Packs/day: 1.50     Years: 31.00     Pack years: 46.50     Types: Cigarettes     Start date: 1966     Quit date:      Years since quittin.5    Smokeless tobacco: Never Used   Vaping Use    Vaping Use: Never used   Substance and Sexual Activity    Alcohol use: Yes     Alcohol/week: 14.0 standard drinks     Types: 14 Cans of beer per week     Comment: drinks daily during summer months    Drug use: No    Sexual activity: Yes     Partners: Female   Other Topics Concern    Not on file   Social History Narrative    Not on file     Social Determinants of Health     Financial Resource Strain:     Difficulty of Paying Living Expenses:    Food Insecurity:     Worried About Running Out of Food in the Last Year:     [address] in the Last Year:    Transportation Needs:     Lack of Transportation (Medical):  Lack of Transportation (Non-Medical):    Physical Activity:     Days of Exercise per Week:     Minutes of Exercise per Session:    Stress:     Feeling of Stress :    Social Connections:     Frequency of Communication with Friends and Family:     Frequency of Social Gatherings with Friends and Family:     Attends Nondenominational Services:     Active Member of Clubs or Organizations:     Attends Club or Organization Meetings:     Marital Status:    Intimate Partner Violence:     Fear of Current or Ex-Partner:     Emotionally Abused:     Physically Abused:     Sexually Abused:        Vitals:    21 1527   BP: (!) 140/80   Pulse: 83   Temp: 98.5 °F (36.9 °C)   SpO2: 97%   Weight: 266 lb (120.7 kg)       Physical Exam  Exam:  Const: Appears healthy,well developed and well nourished. Appears obese. Eyes: EOMI in both eyes. PERRL.   ENMT: External canals are clear and dry. Tympanic membranes: thickening. External nose WNL. Neck: Supple and symmetric. Palpation reveals no adenopathy. No masses appreciated. Thyroid  exhibits no nodules or thyromegaly. No JVD. Resp: Respirations are unlabored. Respiration rate is normal. Auscultate good airflow. No rales,  rhonchi or wheezes appreciated over the lungs bilaterally. CV: Rhythm is regular. S1 is normal. S2 is normal. No heart murmur appreciated. Carotids: no  bruits. Abdominal aorta is not palpable. Pedal pulses: 2+ and equal bilaterally No abdominal bruits. Extremities: No clubbing, cyanosis or edema is 1-2+ below the mid tibia bilaterally. Abdomen: Bowel sounds are normoactive. Palpation of the abdomen reveals softness, but no  distension, organomegaly or tenderness. No abdominal masses. No palpable hepatosplenomegaly. /RECTAL: Normal perianal area. Normal sphincter tone. Prostate is slightly enlarged but soft and nontender. No distinct nodules. Stool is brown guaiac-negative  Musculo: Walks with a limping gait. Upper Extremities: Full ROM bilaterally. Lower Extremities:  Limitation of the lower extremities. Skin: Dry and warm with no rash. Neuro: Alert and oriented x3. Mood is normal. Affect is normal. Speech is articulate and fluent. Reflexes: DTR's are intact, symmetric and 2+ bilaterally. Psych: Patient's attitude is cooperative. Patient's affect is appropriate. Judgement is realistic. Insight  is appropriate. Romayne Nao was seen today for other. Diagnoses and all orders for this visit:    Coronary artery disease involving native coronary artery of native heart without angina pectoris    Essential hypertension    Hx of seizure disorder    Driving forms are completed for the patient. Medications are listed. Note from neurology is also included. Patient is not permitted to take narcotic analgesics.

## 2021-07-27 NOTE — TELEPHONE ENCOUNTER
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 When would you like to see him?

## 2021-07-29 ENCOUNTER — TELEPHONE (OUTPATIENT)
Dept: NEUROLOGY | Age: 71
End: 2021-07-29

## 2021-07-29 NOTE — TELEPHONE ENCOUNTER
Patient called in stating that per his employer the previous letter from Jose Roberto August was not adequate. The letter needs to state that the patient does not have a seizure disorder, but that he had a stroke and is on Keppra to prevent seizures. Please advise.

## 2021-07-29 NOTE — LETTER
[facility]  [address]  [address] [place] [address]  Phone: [phone]  Fax: [phone]  DESTINEE Fragoso CNP        July 30, 2021    To Whom it May Concern,    Nori Canchola had a stroke which caused seizure. He is on Keppra to prevent further seizures, which has been effective. He is cleared to drive from a neurologic standpoint, while on this medication.       Sincerely,        DESTINEE Fragoso CNP

## 2021-07-30 NOTE — TELEPHONE ENCOUNTER
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

## 2021-07-30 NOTE — TELEPHONE ENCOUNTER
I have once again adjusted the letter and the phrase \"seizure disorder\" has not been used. Here is what I wrote in the letter:     Williams Gee had a stroke which caused seizure. He is on Keppra to prevent further seizures, which has been effective. He is cleared to drive from a neurologic standpoint, while on this medication\"    I will not be adjusting the letter again. Since he has been seizure free for over a year, from an [place] law and a neurologic standpoint, he is cleared to drive without restrictions as long as he takes his Keppra--as was discussed at his office visit with Novacem. If he has any further questions or concerns, he needs to make an appt and everything will be explained in person.  Novacem wanted him to be seen back in 6 mos after his Oct visit anyway

## 2021-07-30 NOTE — TELEPHONE ENCOUNTER
Called patient to let him know that letter was adjusted. He stated that he is not sure how this is a seizure disorder when it was one seizure. 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 He wants to know what he is supposed to do now? He stated that it cannot say he has a seizure disorder. Please advise. Could you please call patient.

## 2021-08-02 ENCOUNTER — TELEPHONE (OUTPATIENT)
Dept: NEUROLOGY | Age: 71
End: 2021-08-02

## 2021-08-02 NOTE — TELEPHONE ENCOUNTER
Guilherme at Dr. Claudia Britton office needs to know when (a specific date) and where (location of the brain) Dinorah Pak had a stroke. Please advise.     Electronically signed by Helder Casey MA on 8/2/2021 at 10:07 AM

## 2021-12-22 ENCOUNTER — OFFICE VISIT (OUTPATIENT)
Dept: FAMILY MEDICINE CLINIC | Age: 71
End: 2021-12-22
Payer: MEDICARE

## 2021-12-22 VITALS
DIASTOLIC BLOOD PRESSURE: 80 MMHG | TEMPERATURE: 98.1 F | OXYGEN SATURATION: 94 % | WEIGHT: 273 LBS | BODY MASS INDEX: 37.03 KG/M2 | SYSTOLIC BLOOD PRESSURE: 140 MMHG | HEART RATE: 64 BPM

## 2021-12-22 DIAGNOSIS — E78.2 MIXED HYPERLIPIDEMIA: ICD-10-CM

## 2021-12-22 DIAGNOSIS — I69.398 SEIZURE AS LATE EFFECT OF CEREBROVASCULAR ACCIDENT (CVA) (HCC): ICD-10-CM

## 2021-12-22 DIAGNOSIS — I10 ESSENTIAL HYPERTENSION: ICD-10-CM

## 2021-12-22 DIAGNOSIS — M54.16 LUMBAR RADICULOPATHY: Primary | ICD-10-CM

## 2021-12-22 DIAGNOSIS — I25.10 CORONARY ARTERY DISEASE INVOLVING NATIVE CORONARY ARTERY OF NATIVE HEART WITHOUT ANGINA PECTORIS: ICD-10-CM

## 2021-12-22 DIAGNOSIS — M15.9 PRIMARY OSTEOARTHRITIS INVOLVING MULTIPLE JOINTS: ICD-10-CM

## 2021-12-22 DIAGNOSIS — R56.9 SEIZURE AS LATE EFFECT OF CEREBROVASCULAR ACCIDENT (CVA) (HCC): ICD-10-CM

## 2021-12-22 LAB
ALBUMIN SERPL-MCNC: 4.4 G/DL (ref 3.5–5.2)
ALP BLD-CCNC: 93 U/L (ref 40–129)
ALT SERPL-CCNC: 44 U/L (ref 0–40)
ANION GAP SERPL CALCULATED.3IONS-SCNC: 17 MMOL/L (ref 7–16)
AST SERPL-CCNC: 32 U/L (ref 0–39)
BILIRUB SERPL-MCNC: 0.5 MG/DL (ref 0–1.2)
BUN BLDV-MCNC: 13 MG/DL (ref 6–23)
CALCIUM SERPL-MCNC: 9.9 MG/DL (ref 8.6–10.2)
CHLORIDE BLD-SCNC: 100 MMOL/L (ref 98–107)
CO2: 21 MMOL/L (ref 22–29)
CREAT SERPL-MCNC: 0.9 MG/DL (ref 0.7–1.2)
GFR AFRICAN AMERICAN: >60
GFR NON-AFRICAN AMERICAN: >60 ML/MIN/1.73
GLUCOSE BLD-MCNC: 113 MG/DL (ref 74–99)
POTASSIUM SERPL-SCNC: 4.3 MMOL/L (ref 3.5–5)
SODIUM BLD-SCNC: 138 MMOL/L (ref 132–146)
TOTAL PROTEIN: 8.2 G/DL (ref 6.4–8.3)

## 2021-12-22 PROCEDURE — 90694 VACC AIIV4 NO PRSRV 0.5ML IM: CPT | Performed by: INTERNAL MEDICINE

## 2021-12-22 PROCEDURE — 99214 OFFICE O/P EST MOD 30 MIN: CPT | Performed by: INTERNAL MEDICINE

## 2021-12-22 PROCEDURE — G0008 ADMIN INFLUENZA VIRUS VAC: HCPCS | Performed by: INTERNAL MEDICINE

## 2021-12-22 RX ORDER — OXYCODONE AND ACETAMINOPHEN 10; 325 MG/1; MG/1
TABLET ORAL
COMMUNITY
Start: 2021-11-27

## 2021-12-22 RX ORDER — FENTANYL 12 UG/H
PATCH TRANSDERMAL
COMMUNITY
Start: 2021-12-01 | End: 2022-07-18

## 2021-12-22 RX ORDER — LEVETIRACETAM 500 MG/1
500 TABLET ORAL 2 TIMES DAILY
Qty: 180 TABLET | Refills: 1 | Status: SHIPPED
Start: 2021-12-22 | End: 2022-06-22 | Stop reason: SDUPTHER

## 2021-12-22 RX ORDER — LISINOPRIL 20 MG/1
20 TABLET ORAL DAILY
Qty: 90 TABLET | Refills: 1 | Status: SHIPPED
Start: 2021-12-22 | End: 2022-06-22 | Stop reason: SDUPTHER

## 2021-12-22 RX ORDER — PRAVASTATIN SODIUM 40 MG
40 TABLET ORAL DAILY
Qty: 90 TABLET | Refills: 1 | Status: SHIPPED
Start: 2021-12-22 | End: 2022-06-22 | Stop reason: SDUPTHER

## 2021-12-22 RX ORDER — METOPROLOL SUCCINATE 25 MG/1
25 TABLET, EXTENDED RELEASE ORAL DAILY
Qty: 90 TABLET | Refills: 1 | Status: SHIPPED
Start: 2021-12-22 | End: 2022-06-22 | Stop reason: SDUPTHER

## 2021-12-22 NOTE — PROGRESS NOTES
SLEEP STUDY ORDERED 10/13-CPAP BEGUN  13. LUMBAR RADICULOPATHY-8/17/15-PHILLIP/DONNY-SURGERY SCHEDULED 17  14. PNEUMONIA- 5/10/16-CXR CLEARED  15. CARDIOMEGALY-16-NORMAL ECHO 16 and  16. LOW BACK PAIN- REFERRED TO [facility] 18  17. OBESITY- GLP1 DISCUSSED 10/31/18  18. CHF-- referred to [facility]. Bilateral Carpal Tunnel- wrist splint   20. Abnormal stres- cath planned 12/10/2019  21. CVA-   Surgical Hx:  Knee Replacement - () BILATERAL-  1. He had right shoulder surgery in .  2. He had a vasectomy at age 33.  1. He had bilateral knee replacements in . 4. Sebaceous cyst removal by Dr. Amira Owens in . 5. Basal cell carcinoma by Dr. Elise Pearl in . 6. LUMBAR FUSION 3/2021 Becky Gamez  Reviewed and updated. FH:  Father:  Cerebrovascular Accident (CVA) - age 80. Mother:  Leukemia -  in her 29's. Reviewed, no changes. SH:  Marital: . Personal Habits: Cigarette Use: Former Cigarette Smoker 1 Pack Daily - for 30 years Negative For  current cigarette smoker. Alcohol: Occasionally consumes alcohol. Daily Caffeine: Consumes on  average 4 cups of hot tea per day - more recently 2 cups. Exercise Type: Unable to walk long distance. .  Reviewed, no changes. Date: 2021  Was the patient queried about smoking behavior? Yes   Does the patient currently smoke? Smoking: Patient is a former smoker.     Current Outpatient Medications:     levETIRAcetam (KEPPRA) 500 MG tablet, Take 1 tablet by mouth 2 times daily, Disp: 180 tablet, Rfl: 1    metoprolol succinate (TOPROL XL) 25 MG extended release tablet, Take 1 tablet by mouth daily, Disp: 90 tablet, Rfl: 1    pravastatin (PRAVACHOL) 40 MG tablet, Take 1 tablet by mouth daily, Disp: 90 tablet, Rfl: 1    lisinopril (PRINIVIL;ZESTRIL) 20 MG tablet, Take 1 tablet by mouth daily, Disp: 90 tablet, Rfl: 1    sildenafil (VIAGRA) 50 MG tablet, Take 1 tablet by mouth daily as needed for Erectile Dysfunction, Disp: 10 tablet, Rfl: 5    vitamin C (ASCORBIC ACID) 500 MG tablet, Take 500 mg by mouth daily , Disp: , Rfl:     aspirin 81 MG chewable tablet, Take 1 tablet by mouth daily, Disp: 30 tablet, Rfl: 3    oxyCODONE-acetaminophen (PERCOCET)  MG per tablet, TAKE ONE TABLET BY MOUTH THREE TIMES DAILY AS NEEDED FOR PAIN START 11/28 END 12/27/21, Disp: , Rfl:     fentaNYL (DURAGESIC) 12 MCG/HR, APPLY ONE PATCH TO SKIN EVERY 72 HOURS. REMOVE OLD PATCH. START 11/28 END 12/27/21, Disp: , Rfl:     gabapentin (NEURONTIN) 300 MG capsule, Take 300 mg by mouth 2 times daily.  (Patient not taking: Reported on 12/22/2021), Disp: , Rfl:     Hydrocortisone Acetate 1 % OINT, Apply 28.4 g topically 2 times daily (Patient not taking: Reported on 10/13/2020), Disp: 1 Tube, Rfl: 1  Allergies   Allergen Reactions    Statins Other (See Comments)     Myalgias        Past Medical History:   Diagnosis Date    Basal cell carcinoma     CAD (coronary artery disease) 11/2019    Cardiomegaly 05/17/2016    Chronic back pain     Chronic back pain     CPAP (continuous positive airway pressure) dependence     Erectile dysfunction     Erectile dysfunction     Esophagitis 09/2011    VITO    H/O cardiomegaly     NORMAL ECHO IN 2016    H/O hypogonadism 2012    History of basal cell carcinoma     History of pneumonia 2016    History of sebaceous cyst     Hyperlipidemia     Hypertension     Hypogonadism in male 12/18/2012    Lumbar radiculopathy 08/17/2015    Obesity     Obesity     Osteoarthritis     Postoperative atrial fibrillation ([facility] [address] 12/24/2019    Sleep apnea      Past Surgical History:   Procedure Laterality Date    CARDIAC CATHETERIZATION  12/10/2019    scavina  CT surgery consult    CORONARY ARTERY BYPASS GRAFT N/A 12/23/2019    CABG CORONARY ARTERY BYPASS, SHANTELL performed by Heath Castillo MD at [address]  2005    [facility] ESOPHGOSCOPY  9/10/2011    foreign body removal    JOINT REPLACEMENT Bilateral 2004    knees [place]  2004    bilateral knee replacements    SHOULDER SURGERY  1997    right rotator cuff    SHOULDER SURGERY Right 1997    SKIN CANCER EXCISION  2003    CUDDAPAH    VASECTOMY      AGE 33     Family History   Problem Relation Age of Onset    Cancer Mother     Stroke Father     Stroke Sister     No Known Problems Brother     Stroke Sister     No Known Problems Sister     No Known Problems Sister     No Known Problems Sister      Social History     Socioeconomic History    Marital status:      Spouse name: Not on file    Number of children: 2    Years of education: Not on file    Highest education level: Not on file   Occupational History    Not on file   Tobacco Use    Smoking status: Former Smoker     Packs/day: 1.50     Years: 31.00     Pack years: 46.50     Types: Cigarettes     Start date: 1966     Quit date:      Years since quittin.9    Smokeless tobacco: Never Used   Vaping Use    Vaping Use: Never used   Substance and Sexual Activity    Alcohol use: Yes     Alcohol/week: 14.0 standard drinks     Types: 14 Cans of beer per week     Comment: drinks daily during summer months    Drug use: No    Sexual activity: Yes     Partners: Female   Other Topics Concern    Not on file   Social History Narrative    Not on file     Social Determinants of Health     Financial Resource Strain:     Difficulty of Paying Living Expenses: Not on file   Food Insecurity:     Worried About Running Out of Food in the Last Year: Not on file    Angelique of Food in the Last Year: Not on file   Transportation Needs:     Lack of Transportation (Medical): Not on file    Lack of Transportation (Non-Medical):  Not on file   Physical Activity:     Days of Exercise per Week: Not on file    Minutes of Exercise per Session: Not on file   Stress:     Feeling of Stress : Not on file   Social Connections:     Frequency of Communication with Friends and Family: Not on file    Frequency of Social Gatherings with Friends and Family: Not on file    Attends Jewish Services: Not on file    Active Member of Clubs or Organizations: Not on file    Attends Club or Organization Meetings: Not on file    Marital Status: Not on file   Intimate Partner Violence:     Fear of Current or Ex-Partner: Not on file    Emotionally Abused: Not on file    Physically Abused: Not on file    Sexually Abused: Not on file   Housing Stability:     Unable to Pay for Housing in the Last Year: Not on file    Number of [place] in the Last Year: Not on file    Unstable Housing in the Last Year: Not on file       Vitals:    12/22/21 1003   Pulse: 64   Temp: 98.1 °F (36.7 °C)   SpO2: 94%   Weight: 273 lb (123.8 kg)       Physical Exam  Exam:  Const: Appears healthy,well developed and well nourished. Appears obese. Eyes: EOMI in both eyes. PERRL. ENMT: External canals are clear and dry. Tympanic membranes: thickening. External nose WNL. Neck: Supple and symmetric. Palpation reveals no adenopathy. No masses appreciated. Thyroid  exhibits no nodules or thyromegaly. No JVD. Resp: Respirations are unlabored. Respiration rate is normal. Auscultate good airflow. No rales,  rhonchi or wheezes appreciated over the lungs bilaterally. CV: Rhythm is regular. S1 is normal. S2 is normal. No heart murmur appreciated. Carotids: no  bruits. Abdominal aorta is not palpable. Pedal pulses: 2+ and equal bilaterally No abdominal bruits. Extremities: No clubbing, cyanosis or edema is 1-2+ below the mid tibia bilaterally. Abdomen: Bowel sounds are normoactive. Palpation of the abdomen reveals softness, but no  distension, organomegaly or tenderness. No abdominal masses. No palpable hepatosplenomegaly. Musculo: Walks with a limping gait. Upper Extremities: Full ROM bilaterally. Lower Extremities:  Limitation of the lower extremities. Skin: Dry and warm with no rash. Neuro: Alert and oriented x3.  Mood is normal. Affect is normal. Speech is articulate and fluent. Reflexes: DTR's are intact, symmetric and 2+ bilaterally. Psych: Patient's attitude is cooperative. Patient's affect is appropriate. Judgement is realistic. Insight  is appropriate. Adalid Luz was seen today for hypertension and hyperlipidemia. Diagnoses and all orders for this visit:    Lumbar radiculopathy    Coronary artery disease involving native coronary artery of native heart without angina pectoris  -     levETIRAcetam (KEPPRA) 500 MG tablet; Take 1 tablet by mouth 2 times daily  -     lisinopril (PRINIVIL;ZESTRIL) 20 MG tablet; Take 1 tablet by mouth daily  -     metoprolol succinate (TOPROL XL) 25 MG extended release tablet; Take 1 tablet by mouth daily  -     pravastatin (PRAVACHOL) 40 MG tablet; Take 1 tablet by mouth daily  -     INFLUENZA, QUADV, ADJUVANTED, 65 YRS =, IM, PF, PREFILL SYR, 0.5ML (FLUAD)  -     Comprehensive Metabolic Panel; Future  -     External Referral To Neurosurgery    Essential hypertension  -     levETIRAcetam (KEPPRA) 500 MG tablet; Take 1 tablet by mouth 2 times daily  -     lisinopril (PRINIVIL;ZESTRIL) 20 MG tablet; Take 1 tablet by mouth daily  -     metoprolol succinate (TOPROL XL) 25 MG extended release tablet; Take 1 tablet by mouth daily  -     pravastatin (PRAVACHOL) 40 MG tablet; Take 1 tablet by mouth daily  -     INFLUENZA, QUADV, ADJUVANTED, 65 YRS =, IM, PF, PREFILL SYR, 0.5ML (FLUAD)  -     Comprehensive Metabolic Panel; Future  -     External Referral To Neurosurgery    Primary osteoarthritis involving multiple joints  -     levETIRAcetam (KEPPRA) 500 MG tablet; Take 1 tablet by mouth 2 times daily  -     lisinopril (PRINIVIL;ZESTRIL) 20 MG tablet; Take 1 tablet by mouth daily  -     metoprolol succinate (TOPROL XL) 25 MG extended release tablet; Take 1 tablet by mouth daily  -     pravastatin (PRAVACHOL) 40 MG tablet;  Take 1 tablet by mouth daily  -     INFLUENZA, QUADV, ADJUVANTED, 65 YRS =, IM, PF, PREFILL SYR, 0.5ML (FLUAD)  -     Comprehensive Metabolic Panel; Future  -     External Referral To Neurosurgery    Seizure as late effect of cerebrovascular accident (CVA) ([address]    Mixed hyperlipidemia  -     levETIRAcetam (KEPPRA) 500 MG tablet; Take 1 tablet by mouth 2 times daily  -     lisinopril (PRINIVIL;ZESTRIL) 20 MG tablet; Take 1 tablet by mouth daily  -     metoprolol succinate (TOPROL XL) 25 MG extended release tablet; Take 1 tablet by mouth daily  -     pravastatin (PRAVACHOL) 40 MG tablet; Take 1 tablet by mouth daily  -     INFLUENZA, QUADV, ADJUVANTED, 65 YRS =, IM, PF, PREFILL SYR, 0.5ML (FLUAD)  -     Comprehensive Metabolic Panel; Future  -     External Referral To Neurosurgery    CMP will be obtained today. Patient has been seizure-free. He continues on antiseizure medication. His biggest problem currently is his back and this needs to be at least reassessed. Caudal block to this point is really not helped.   The patient is referred to [facility] Joaquin Johnson at the [facility]

## 2022-02-23 ENCOUNTER — TELEPHONE (OUTPATIENT)
Dept: FAMILY MEDICINE CLINIC | Age: 72
End: 2022-02-23

## 2022-02-23 NOTE — TELEPHONE ENCOUNTER
Claria Nissen calling in he is getting a CT and Xray of his back on fri. Dr Eleanor Beltran ordered. They told him to ask pcp if there is any rel event info they need to be aware of before he gets tests?

## 2022-06-22 ENCOUNTER — OFFICE VISIT (OUTPATIENT)
Dept: FAMILY MEDICINE CLINIC | Age: 72
End: 2022-06-22
Payer: MEDICARE

## 2022-06-22 VITALS
DIASTOLIC BLOOD PRESSURE: 80 MMHG | BODY MASS INDEX: 36.62 KG/M2 | SYSTOLIC BLOOD PRESSURE: 138 MMHG | WEIGHT: 270 LBS | HEART RATE: 66 BPM | TEMPERATURE: 98.6 F | OXYGEN SATURATION: 97 %

## 2022-06-22 DIAGNOSIS — M15.9 PRIMARY OSTEOARTHRITIS INVOLVING MULTIPLE JOINTS: ICD-10-CM

## 2022-06-22 DIAGNOSIS — E78.2 MIXED HYPERLIPIDEMIA: ICD-10-CM

## 2022-06-22 DIAGNOSIS — I25.10 CORONARY ARTERY DISEASE INVOLVING NATIVE CORONARY ARTERY OF NATIVE HEART WITHOUT ANGINA PECTORIS: ICD-10-CM

## 2022-06-22 DIAGNOSIS — R73.03 PREDIABETES: ICD-10-CM

## 2022-06-22 DIAGNOSIS — M96.1 POSTLAMINECTOMY SYNDROME: ICD-10-CM

## 2022-06-22 DIAGNOSIS — Z12.5 PROSTATE CANCER SCREENING: ICD-10-CM

## 2022-06-22 DIAGNOSIS — I10 ESSENTIAL HYPERTENSION: ICD-10-CM

## 2022-06-22 DIAGNOSIS — Z12.5 PROSTATE CANCER SCREENING: Primary | ICD-10-CM

## 2022-06-22 LAB
ALBUMIN SERPL-MCNC: 4.3 G/DL (ref 3.5–5.2)
ALP BLD-CCNC: 82 U/L (ref 40–129)
ALT SERPL-CCNC: 41 U/L (ref 0–40)
ANION GAP SERPL CALCULATED.3IONS-SCNC: 17 MMOL/L (ref 7–16)
AST SERPL-CCNC: 41 U/L (ref 0–39)
BASOPHILS ABSOLUTE: 0.07 E9/L (ref 0–0.2)
BASOPHILS RELATIVE PERCENT: 0.7 % (ref 0–2)
BILIRUB SERPL-MCNC: 0.3 MG/DL (ref 0–1.2)
BUN BLDV-MCNC: 16 MG/DL (ref 6–23)
CALCIUM SERPL-MCNC: 9.6 MG/DL (ref 8.6–10.2)
CHLORIDE BLD-SCNC: 100 MMOL/L (ref 98–107)
CHOLESTEROL, TOTAL: 195 MG/DL (ref 0–199)
CO2: 22 MMOL/L (ref 22–29)
CREAT SERPL-MCNC: 0.9 MG/DL (ref 0.7–1.2)
EOSINOPHILS ABSOLUTE: 0.45 E9/L (ref 0.05–0.5)
EOSINOPHILS RELATIVE PERCENT: 4.6 % (ref 0–6)
GFR AFRICAN AMERICAN: >60
GFR NON-AFRICAN AMERICAN: >60 ML/MIN/1.73
GLUCOSE BLD-MCNC: 95 MG/DL (ref 74–99)
HBA1C MFR BLD: 6.1 %
HCT VFR BLD CALC: 49.5 % (ref 37–54)
HDLC SERPL-MCNC: 60 MG/DL
HEMOGLOBIN: 15.8 G/DL (ref 12.5–16.5)
IMMATURE GRANULOCYTES #: 0.05 E9/L
IMMATURE GRANULOCYTES %: 0.5 % (ref 0–5)
LDL CHOLESTEROL CALCULATED: 99 MG/DL (ref 0–99)
LYMPHOCYTES ABSOLUTE: 1.97 E9/L (ref 1.5–4)
LYMPHOCYTES RELATIVE PERCENT: 20.1 % (ref 20–42)
MCH RBC QN AUTO: 33.8 PG (ref 26–35)
MCHC RBC AUTO-ENTMCNC: 31.9 % (ref 32–34.5)
MCV RBC AUTO: 106 FL (ref 80–99.9)
MONOCYTES ABSOLUTE: 1.1 E9/L (ref 0.1–0.95)
MONOCYTES RELATIVE PERCENT: 11.2 % (ref 2–12)
NEUTROPHILS ABSOLUTE: 6.17 E9/L (ref 1.8–7.3)
NEUTROPHILS RELATIVE PERCENT: 62.9 % (ref 43–80)
PDW BLD-RTO: 13.1 FL (ref 11.5–15)
PLATELET # BLD: 291 E9/L (ref 130–450)
PMV BLD AUTO: 11.9 FL (ref 7–12)
POTASSIUM SERPL-SCNC: 4.9 MMOL/L (ref 3.5–5)
PROSTATE SPECIFIC ANTIGEN: 4.43 NG/ML (ref 0–4)
RBC # BLD: 4.67 E12/L (ref 3.8–5.8)
SODIUM BLD-SCNC: 139 MMOL/L (ref 132–146)
TOTAL PROTEIN: 7.7 G/DL (ref 6.4–8.3)
TRIGL SERPL-MCNC: 178 MG/DL (ref 0–149)
TSH SERPL DL<=0.05 MIU/L-ACNC: 1.97 UIU/ML (ref 0.27–4.2)
VLDLC SERPL CALC-MCNC: 36 MG/DL
WBC # BLD: 9.8 E9/L (ref 4.5–11.5)

## 2022-06-22 PROCEDURE — 83036 HEMOGLOBIN GLYCOSYLATED A1C: CPT | Performed by: INTERNAL MEDICINE

## 2022-06-22 PROCEDURE — 1123F ACP DISCUSS/DSCN MKR DOCD: CPT | Performed by: INTERNAL MEDICINE

## 2022-06-22 PROCEDURE — 99214 OFFICE O/P EST MOD 30 MIN: CPT | Performed by: INTERNAL MEDICINE

## 2022-06-22 RX ORDER — LISINOPRIL 20 MG/1
20 TABLET ORAL DAILY
Qty: 90 TABLET | Refills: 1 | Status: SHIPPED | OUTPATIENT
Start: 2022-06-22

## 2022-06-22 RX ORDER — MORPHINE SULFATE 15 MG/1
TABLET, FILM COATED, EXTENDED RELEASE ORAL
COMMUNITY
Start: 2022-01-29

## 2022-06-22 RX ORDER — LEVETIRACETAM 500 MG/1
500 TABLET ORAL 2 TIMES DAILY
Qty: 180 TABLET | Refills: 1 | Status: SHIPPED | OUTPATIENT
Start: 2022-06-22

## 2022-06-22 RX ORDER — METOPROLOL SUCCINATE 25 MG/1
25 TABLET, EXTENDED RELEASE ORAL DAILY
Qty: 90 TABLET | Refills: 1 | Status: SHIPPED | OUTPATIENT
Start: 2022-06-22

## 2022-06-22 RX ORDER — PRAVASTATIN SODIUM 40 MG
40 TABLET ORAL DAILY
Qty: 90 TABLET | Refills: 1 | Status: SHIPPED | OUTPATIENT
Start: 2022-06-22

## 2022-06-22 ASSESSMENT — PATIENT HEALTH QUESTIONNAIRE - PHQ9
2. FEELING DOWN, DEPRESSED OR HOPELESS: 0
SUM OF ALL RESPONSES TO PHQ QUESTIONS 1-9: 0
SUM OF ALL RESPONSES TO PHQ QUESTIONS 1-9: 0
1. LITTLE INTEREST OR PLEASURE IN DOING THINGS: 0
SUM OF ALL RESPONSES TO PHQ9 QUESTIONS 1 & 2: 0
SUM OF ALL RESPONSES TO PHQ QUESTIONS 1-9: 0
SUM OF ALL RESPONSES TO PHQ QUESTIONS 1-9: 0

## 2022-06-22 NOTE — PROGRESS NOTES
19  Moises Dmitriy : 1950 Sex: male  Age: 71 y.o. Chief Complaint   Patient presents with    6 Month Follow-Up       Patient was seen by neurosurgery at the [facility], [facility] clinic. They did not recommend any further surgical intervention but they did recommend trial of spinal cord stimulator. Patient did have this over 61. He said it was quite good at relieving his discomfort. He does have an appointment with neurosurgery to have this placed permanently. Patient denies cardiac or respiratory symptoms. Patient also denies focal neurologic deficits. He has not had any bowel or bladder issues. He did have a prostate exam last year his PSA level did come down slightly but I will check a PSA today and if it is elevated then he might need urologic evaluation. He is undergoing pain management through [facility]. Hypertension    Hyperlipidemia    Other        Review of Systems  Health Maintenance:  Colonoscopy - (2020) Spendjiy diverticular dx  Colonoscopy Screening - (2017)  Couseled on Home Safety - (5/10/2017)  Physical Exam - (2022)  Prostate Exam - (2021)  Psa Test - (2022)  Rectal Exam - (2021)  EKG  Colonoscopy - (2017) Rochele Factor  EGD - () VITO  Sleep Study - (2014)   Influenza Vaccination - ()  Prevnar Vaccine - (2018) SLIP GIVEN  Shingrix Vaccine (Shingles) - (2018) Le[address]   1. Obesity. 2. Hyperlipidemia. BEGAN SIMVISTATIN 2/1/10  3. HYPERTENSION BEGAN LISINOPRIL/HCT 2/1/10  4. Erectile dysfunction. 5. History of basal cell carcinoma. 6. History of previous sebaceous cyst.  7. OA HAD BILATERAL KNEE REPLACEMENTS  8. CHRONIC BACK PAIN- TASH EXERCISES RECOMMENDED 3/11  9. ESOPHAGITIS/PUD-  VITO  10. FEVER/DIARRHEA/RASH - PROBABLY VIRAL 12 ADMITTED TO  E'[facility]- SEE REPORTS  11. HYPOGONADISM- MRI ORDERED 12  12. SNORING-FATIGUE- SLEEP APNEA- SLEEP STUDY ORDERED 10/13-CPAP BEGUN  13.  LUMBAR RADICULOPATHY-8/17/15-PHILLIP/DONNY-SURGERY SCHEDULED 17  14. PNEUMONIA- 5/10/16-CXR CLEARED  15. CARDIOMEGALY-16-NORMAL ECHO 16 and  16. LOW BACK PAIN- REFERRED TO [facility] 18  17. OBESITY- GLP1 DISCUSSED 10/31/18  18. CHF-- referred to [facility]. Bilateral Carpal Tunnel- wrist splint   20. Abnormal stres- cath planned 12/10/2019  21. CVA-   22. Post laminectomy syndrome  [facility]/ Jhon  Surgical Hx:  Knee Replacement - () BILATERAL-  1. He had right shoulder surgery in .  2. He had a vasectomy at age 33.  1. He had bilateral knee replacements in . 4. Sebaceous cyst removal by Dr. Elkin Jauregui in . 5. Basal cell carcinoma by Dr. Pam Fermin in . 6. LUMBAR FUSION 3/2021 Fouzia Cuellar  Reviewed and updated. FH:  Father:  Cerebrovascular Accident (CVA) - age 80. Mother:  Leukemia -  in her 29's. Reviewed, no changes. SH:  Marital: . Personal Habits: Cigarette Use: Former Cigarette Smoker 1 Pack Daily - for 30 years Negative For  current cigarette smoker. Alcohol: Occasionally consumes alcohol. Daily Caffeine: Consumes on  average 4 cups of hot tea per day - more recently 2 cups. Exercise Type: Unable to walk long distance. .  Reviewed, no changes. Date: 2022  Was the patient queried about smoking behavior? Yes   Does the patient currently smoke? Smoking: Patient is a former smoker.     Current Outpatient Medications:     morphine (MS CONTIN) 15 MG extended release tablet, TAKE ONE TABLET BY MOUTH EVERY 8 HOURS start 22 end 22, Disp: , Rfl:     oxyCODONE-acetaminophen (PERCOCET)  MG per tablet, TAKE ONE TABLET BY MOUTH THREE TIMES DAILY AS NEEDED FOR PAIN START  END 21, Disp: , Rfl:     levETIRAcetam (KEPPRA) 500 MG tablet, Take 1 tablet by mouth 2 times daily, Disp: 180 tablet, Rfl: 1    lisinopril (PRINIVIL;ZESTRIL) 20 MG tablet, Take 1 tablet by mouth daily, Disp: 90 tablet, Rfl: 1    metoprolol succinate (TOPROL XL) 25 MG extended release tablet, Take 1 tablet by mouth daily, Disp: 90 tablet, Rfl: 1    pravastatin (PRAVACHOL) 40 MG tablet, Take 1 tablet by mouth daily, Disp: 90 tablet, Rfl: 1    vitamin C (ASCORBIC ACID) 500 MG tablet, Take 500 mg by mouth daily , Disp: , Rfl:     aspirin 81 MG chewable tablet, Take 1 tablet by mouth daily, Disp: 30 tablet, Rfl: 3    fentaNYL (DURAGESIC) 12 MCG/HR, APPLY ONE PATCH TO SKIN EVERY 72 HOURS. REMOVE OLD PATCH. START 11/28 END 12/27/21 (Patient not taking: Reported on 6/22/2022), Disp: , Rfl:     gabapentin (NEURONTIN) 300 MG capsule, Take 300 mg by mouth 2 times daily.  (Patient not taking: Reported on 12/22/2021), Disp: , Rfl:     sildenafil (VIAGRA) 50 MG tablet, Take 1 tablet by mouth daily as needed for Erectile Dysfunction, Disp: 10 tablet, Rfl: 5    Hydrocortisone Acetate 1 % OINT, Apply 28.4 g topically 2 times daily (Patient not taking: Reported on 10/13/2020), Disp: 1 Tube, Rfl: 1  Allergies   Allergen Reactions    Statins Other (See Comments)     Myalgias        Past Medical History:   Diagnosis Date    Basal cell carcinoma     CAD (coronary artery disease) 11/2019    Cardiomegaly 05/17/2016    Chronic back pain     Chronic back pain     CPAP (continuous positive airway pressure) dependence     Erectile dysfunction     Erectile dysfunction     Esophagitis 09/2011    VITO    H/O cardiomegaly     NORMAL ECHO IN 2016    H/O hypogonadism 2012    History of basal cell carcinoma     History of pneumonia 2016    History of sebaceous cyst     Hyperlipidemia     Hypertension     Hypogonadism in male 12/18/2012    Lumbar radiculopathy 08/17/2015    Obesity     Obesity     Osteoarthritis     Postoperative atrial fibrillation ([facility] [address] 12/24/2019    Sleep apnea      Past Surgical History:   Procedure Laterality Date    CARDIAC CATHETERIZATION  12/10/2019    scavina  CT surgery consult    CORONARY ARTERY BYPASS GRAFT N/A 12/23/2019    CABG CORONARY ARTERY BYPASS, SHANTELL performed by Reynaldo Wilcox MD at [address] [place] [address] ESOPHGOSCOPY  9/10/2011    foreign body removal    JOINT REPLACEMENT Bilateral 2004    knees    KNEE SURGERY  2004    bilateral knee replacements    SHOULDER SURGERY  1997    right rotator cuff    SHOULDER SURGERY Right 1997    SKIN CANCER EXCISION  2003    CUDDAPAH    VASECTOMY      AGE 33     Family History   Problem Relation Age of Onset    Cancer Mother     Stroke Father     Stroke Sister     No Known Problems Brother     Stroke Sister     No Known Problems Sister     No Known Problems Sister     No Known Problems Sister      Social History     Socioeconomic History    Marital status:      Spouse name: Not on file    Number of children: 2    Years of education: Not on file    Highest education level: Not on file   Occupational History    Not on file   Tobacco Use    Smoking status: Former Smoker     Packs/day: 1.50     Years: 31.00     Pack years: 46.50     Types: Cigarettes     Start date: 1966     Quit date:      Years since quittin.4    Smokeless tobacco: Never Used   Vaping Use    Vaping Use: Never used   Substance and Sexual Activity    Alcohol use: Yes     Alcohol/week: 14.0 standard drinks     Types: 14 Cans of beer per week     Comment: drinks daily during summer months    Drug use: No    Sexual activity: Yes     Partners: Female   Other Topics Concern    Not on file   Social History Narrative    Not on file     Social Determinants of Health     Financial Resource Strain:     Difficulty of Paying Living Expenses: Not on file   Food Insecurity:     Worried About Running Out of Food in the Last Year: Not on file    Angelique of Food in the Last Year: Not on file   Transportation Needs:     Lack of Transportation (Medical): Not on file    Lack of Transportation (Non-Medical):  Not on file   Physical Activity:     Days of Exercise per Week: Not on file  Minutes of Exercise per Session: Not on file   Stress:     Feeling of Stress : Not on file   Social Connections:     Frequency of Communication with Friends and Family: Not on file    Frequency of Social Gatherings with Friends and Family: Not on file    Attends Buddhist Services: Not on file    Active Member of Clubs or Organizations: Not on file    Attends Club or Organization Meetings: Not on file    Marital Status: Not on file   Intimate Partner Violence:     Fear of Current or Ex-Partner: Not on file    Emotionally Abused: Not on file    Physically Abused: Not on file    Sexually Abused: Not on file   Housing Stability:     Unable to Pay for Housing in the Last Year: Not on file    Number of [place] in the Last Year: Not on file    Unstable Housing in the Last Year: Not on file       Vitals:    06/22/22 1101   BP: 138/80   Pulse: 66   Temp: 98.6 °F (37 °C)   SpO2: 97%   Weight: 270 lb (122.5 kg)       Physical Exam  Exam:  Const: Appears healthy,well developed and well nourished. Appears obese. Eyes: EOMI in both eyes. PERRL. ENMT: External canals are clear and dry. Tympanic membranes: thickening. External nose WNL. Neck: Supple and symmetric. Palpation reveals no adenopathy. No masses appreciated. Thyroid  exhibits no nodules or thyromegaly. No JVD. Resp: Respirations are unlabored. Respiration rate is normal. Auscultate good airflow. No rales,  rhonchi or wheezes appreciated over the lungs bilaterally. CV: Rhythm is regular. S1 is normal. S2 is normal. No heart murmur appreciated. Carotids: no  bruits. Abdominal aorta is not palpable. Pedal pulses: 2+ and equal bilaterally No abdominal bruits. Extremities: No clubbing, cyanosis or edema is 1-2+ below the mid tibia bilaterally. Abdomen: Bowel sounds are normoactive. Palpation of the abdomen reveals softness, but no  distension, organomegaly or tenderness. No abdominal masses. No palpable hepatosplenomegaly.   Musculo: Walks with a limping gait. Upper Extremities: Full ROM bilaterally. Lower Extremities:  Limitation of the lower extremities. Skin: Dry and warm with no rash. Neuro: Alert and oriented x3. Mood is normal. Affect is normal. Speech is articulate and fluent. Reflexes: DTR's are intact, symmetric and 2+ bilaterally. Psych: Patient's attitude is cooperative. Patient's affect is appropriate. Judgement is realistic. Insight  is appropriate. Jeannie Morales was seen today for 6 month follow-up. Diagnoses and all orders for this visit:    Prostate cancer screening  -     PSA Screening; Future    Coronary artery disease involving native coronary artery of native heart without angina pectoris  -     levETIRAcetam (KEPPRA) 500 MG tablet; Take 1 tablet by mouth 2 times daily  -     lisinopril (PRINIVIL;ZESTRIL) 20 MG tablet; Take 1 tablet by mouth daily  -     metoprolol succinate (TOPROL XL) 25 MG extended release tablet; Take 1 tablet by mouth daily  -     pravastatin (PRAVACHOL) 40 MG tablet; Take 1 tablet by mouth daily  -     CBC with Auto Differential; Future    Essential hypertension  -     levETIRAcetam (KEPPRA) 500 MG tablet; Take 1 tablet by mouth 2 times daily  -     lisinopril (PRINIVIL;ZESTRIL) 20 MG tablet; Take 1 tablet by mouth daily  -     metoprolol succinate (TOPROL XL) 25 MG extended release tablet; Take 1 tablet by mouth daily  -     pravastatin (PRAVACHOL) 40 MG tablet; Take 1 tablet by mouth daily  -     CBC with Auto Differential; Future    Primary osteoarthritis involving multiple joints  -     levETIRAcetam (KEPPRA) 500 MG tablet; Take 1 tablet by mouth 2 times daily  -     lisinopril (PRINIVIL;ZESTRIL) 20 MG tablet; Take 1 tablet by mouth daily  -     metoprolol succinate (TOPROL XL) 25 MG extended release tablet; Take 1 tablet by mouth daily  -     pravastatin (PRAVACHOL) 40 MG tablet;  Take 1 tablet by mouth daily  -     CBC with Auto Differential; Future    Mixed hyperlipidemia  -     levETIRAcetam (KEPPRA) 500 MG tablet; Take 1 tablet by mouth 2 times daily  -     lisinopril (PRINIVIL;ZESTRIL) 20 MG tablet; Take 1 tablet by mouth daily  -     metoprolol succinate (TOPROL XL) 25 MG extended release tablet; Take 1 tablet by mouth daily  -     pravastatin (PRAVACHOL) 40 MG tablet; Take 1 tablet by mouth daily  -     Lipid Panel; Future  -     Comprehensive Metabolic Panel; Future  -     TSH; Future    Prediabetes  -     POCT glycosylated hemoglobin (Hb A1C)    Postlaminectomy syndrome    Patient is to have lab work today including screening PSA. If that level is elevated then I would suggest urologic consultation. The patient has follow-up with neurosurgery regarding placement of spinal cord stimulator. The patient is to be seen back in 6 months. Weight loss is encouraged.

## 2022-06-23 DIAGNOSIS — N40.1 BENIGN LOCALIZED PROSTATIC HYPERPLASIA WITH LOWER URINARY TRACT SYMPTOMS (LUTS): ICD-10-CM

## 2022-06-23 DIAGNOSIS — R97.20 ELEVATED PSA: Primary | ICD-10-CM

## 2022-07-01 ENCOUNTER — OFFICE VISIT (OUTPATIENT)
Dept: NEUROSURGERY | Age: 72
End: 2022-07-01
Payer: MEDICARE

## 2022-07-01 VITALS
DIASTOLIC BLOOD PRESSURE: 81 MMHG | SYSTOLIC BLOOD PRESSURE: 151 MMHG | HEART RATE: 65 BPM | HEIGHT: 72 IN | OXYGEN SATURATION: 96 % | WEIGHT: 270 LBS | BODY MASS INDEX: 36.57 KG/M2 | RESPIRATION RATE: 20 BRPM | TEMPERATURE: 97.8 F

## 2022-07-01 DIAGNOSIS — G89.29 CHRONIC MIDLINE LOW BACK PAIN WITHOUT SCIATICA: Primary | ICD-10-CM

## 2022-07-01 DIAGNOSIS — M54.50 CHRONIC MIDLINE LOW BACK PAIN WITHOUT SCIATICA: Primary | ICD-10-CM

## 2022-07-01 PROCEDURE — 99202 OFFICE O/P NEW SF 15 MIN: CPT

## 2022-07-01 PROCEDURE — 99214 OFFICE O/P EST MOD 30 MIN: CPT | Performed by: NEUROLOGICAL SURGERY

## 2022-07-01 PROCEDURE — 1123F ACP DISCUSS/DSCN MKR DOCD: CPT | Performed by: NEUROLOGICAL SURGERY

## 2022-07-01 ASSESSMENT — ENCOUNTER SYMPTOMS
GASTROINTESTINAL NEGATIVE: 1
RESPIRATORY NEGATIVE: 1
ALLERGIC/IMMUNOLOGIC NEGATIVE: 1
EYES NEGATIVE: 1

## 2022-07-01 NOTE — PROGRESS NOTES
Anneliese Quinn (:  1950) is a 71 y.o. male,New patient, here for evaluation of the following chief complaint(s):  New Patient (back pain is constant, pt has had PT @ [facility] in [place] and injections @ Dr Samantha Manley  in the past years and none werre [facility])         ASSESSMENT/PLAN:  1. Chronic midline low back pain without sciatica  71year old male who presents with failed back surgery syndrome. He has back pain that is refractory to medications and physical therapy. He had a spinal cord stimulator trial with [facility] with over 70% improvement in his pain. I will schedule him for a permanent stimulator placement at [facility] but he will need a thoracic MRI first but due to his claustrophobia, he needs an MRI with anesthesia    No follow-ups on file. Subjective   SUBJECTIVE/OBJECTIVE:  HPI  71year old male who presents with back pain. The pain is described as sharp,  Stabbing, aching and burning. The pain is rated as 8/10. It is made worse with activity and better with rest. He denies any new numbness, tingling or weakness. He denies loss of control of bowel or bladder function. He has had a prior lumbar fusion. Review of Systems   Constitutional: Negative. HENT: Negative. Eyes: Negative. Respiratory: Negative. Cardiovascular: Negative. Gastrointestinal: Negative. Endocrine: Negative. Genitourinary: Negative. Musculoskeletal: Negative. Allergic/Immunologic: Negative. Neurological: Negative. Hematological: Negative. Psychiatric/Behavioral: Negative. Objective   Physical Exam  Vitals reviewed. Constitutional:       General: He is not in acute distress. Appearance: Normal appearance. He is normal weight. He is not ill-appearing, toxic-appearing or diaphoretic. HENT:      Head: Normocephalic and atraumatic. Eyes:      General: No visual field deficit or scleral icterus. Right eye: No discharge.          Left eye: No discharge. Extraocular Movements: Extraocular movements intact. Conjunctiva/sclera: Conjunctivae normal.      Pupils: Pupils are equal, round, and reactive to light. Pulmonary:      Effort: Pulmonary effort is normal. No respiratory distress. Abdominal:      General: Abdomen is flat. There is no distension. Musculoskeletal:         General: No swelling, tenderness, deformity or signs of injury. Normal range of motion. Right lower leg: No edema. Skin:     General: Skin is warm and dry. Capillary Refill: Capillary refill takes less than 2 seconds. Coloration: Skin is not jaundiced or pale. Findings: No bruising, erythema, lesion or rash. Neurological:      General: No focal deficit present. Mental Status: He is alert and oriented to person, place, and time. Mental status is at baseline. GCS: GCS eye subscore is 4. GCS verbal subscore is 5. GCS motor subscore is 6. Cranial Nerves: Cranial nerves are intact. No cranial nerve deficit, dysarthria or facial asymmetry. Sensory: Sensation is intact. No sensory deficit. Motor: Motor function is intact. No weakness, tremor, atrophy, abnormal muscle tone, seizure activity or pronator drift. Coordination: Coordination is intact. Romberg sign negative. Coordination normal. Finger-Nose-Finger Test and Heel to Vedia Bogdan Test normal. Rapid alternating movements normal.      Gait: Gait normal.      Deep Tendon Reflexes: Reflexes normal. Babinski sign absent on the right side. Babinski sign absent on the left side. Reflex Scores:       Tricep reflexes are 2+ on the right side and 2+ on the left side. Bicep reflexes are 2+ on the right side and 2+ on the left side. Brachioradialis reflexes are 2+ on the right side and 2+ on the left side. Patellar reflexes are 2+ on the right side and 2+ on the left side. Achilles reflexes are 2+ on the right side and 2+ on the left side.   Psychiatric: Mood and Affect: Mood normal.         Behavior: Behavior normal.         Thought Content: Thought content normal.         Judgment: Judgment normal.            On this date 7/1/2022 I have spent 45 minutes reviewing previous notes, test results and face to face with the patient discussing the diagnosis and importance of compliance with the treatment plan as well as documenting on the day of the visit. An electronic signature was used to authenticate this note.     --Kameron Simental MD

## 2022-07-05 ENCOUNTER — TELEPHONE (OUTPATIENT)
Dept: FAMILY MEDICINE CLINIC | Age: 72
End: 2022-07-05

## 2022-07-05 DIAGNOSIS — I10 PRIMARY HYPERTENSION: Primary | ICD-10-CM

## 2022-07-05 NOTE — LETTER
[address] [place] [address]  Phone: [phone]  Fax: Luly Abbott MD        2022      To Whom It May Concern: It is in my medical opinion that patient William Marcus,  1950, is okay for monitored anesthesia care for an MRI. Should you have any questions or concerns, please contact the office at the above phone number.       Sincerely,        Vitaly Ferrera MD

## 2022-07-05 NOTE — TELEPHONE ENCOUNTER
Patient called in stating that he is claustrophobic and needs an MRI done that Dr. Bob Jackson ordered. Dr. Bob Jackson wants a release signed since the patient needs to be put under for the MRI. Please advise.

## 2022-07-07 NOTE — TELEPHONE ENCOUNTER
Please type out a letter to Dr. Edie Nicholas indicating it is okay for the patient to be placed under monitored anesthesia care for an MRI.   I believe the lab order has been placed in his chart

## 2022-07-07 NOTE — TELEPHONE ENCOUNTER
Patient states his last MRI was with Dr Krystina Strange after his stroke and he had to be put under. He is severely claustrophobic and cannot even get into the open MRIs due to this.

## 2022-07-08 ENCOUNTER — PREP FOR PROCEDURE (OUTPATIENT)
Dept: NEUROSURGERY | Age: 72
End: 2022-07-08

## 2022-07-08 DIAGNOSIS — Z01.818 PRE-OP TESTING: Primary | ICD-10-CM

## 2022-07-08 RX ORDER — SODIUM CHLORIDE 0.9 % (FLUSH) 0.9 %
5-40 SYRINGE (ML) INJECTION EVERY 12 HOURS SCHEDULED
Status: CANCELLED | OUTPATIENT
Start: 2022-07-08

## 2022-07-08 RX ORDER — SODIUM CHLORIDE 0.9 % (FLUSH) 0.9 %
5-40 SYRINGE (ML) INJECTION PRN
Status: CANCELLED | OUTPATIENT
Start: 2022-07-08

## 2022-07-08 RX ORDER — SODIUM CHLORIDE 9 MG/ML
INJECTION, SOLUTION INTRAVENOUS PRN
Status: CANCELLED | OUTPATIENT
Start: 2022-07-08

## 2022-07-08 RX ORDER — SODIUM CHLORIDE 9 MG/ML
INJECTION, SOLUTION INTRAVENOUS CONTINUOUS
Status: CANCELLED | OUTPATIENT
Start: 2022-07-08

## 2022-07-15 ENCOUNTER — TELEPHONE (OUTPATIENT)
Dept: FAMILY MEDICINE CLINIC | Age: 72
End: 2022-07-15

## 2022-07-15 NOTE — TELEPHONE ENCOUNTER
----- Message from Anupama Rowan sent at 7/15/2022  8:15 AM EDT -----  Subject: Appointment Request    Reason for Call: Established Patient Appointment needed: Routine Pre-Op    QUESTIONS    Reason for appointment request? No appointments available during search     Additional Information for Provider? Patient is having surgery spinal back   implant July 29th [facility] Dr. Patricia Reyez please cb patient to schedule   this preop soon.    ---------------------------------------------------------------------------  --------------  Laury Galaviz [facility]  [phone]; OK to leave message on voicemail  ---------------------------------------------------------------------------  --------------  SCRIPT ANSWERS  COVID Screen: Mavis Mendiola

## 2022-07-18 ENCOUNTER — TELEPHONE (OUTPATIENT)
Dept: NEUROSURGERY | Age: 72
End: 2022-07-18

## 2022-07-18 NOTE — TELEPHONE ENCOUNTER
Giulia from [facility] called. She says patient had cardiac clearance from Dr Mickie Kelley on 7/15/22. She wanted us to follow up on that.

## 2022-07-18 NOTE — PROGRESS NOTES
[address] PRE-ADMISSION TESTING GENERAL INSTRUCTIONS- [facility]-phone number:[phone]    GENERAL INSTRUCTIONS  [x] Antibacterial Soap shower Night before surgery  [x] Ready Prep CHG wipe instruction sheet and wipes given. [x] Nothing by mouth after midnight, including gum, candy, mints, or water. [x] You may brush your teeth, gargle, but do NOT swallow water. [x]No smoking, chewing tobacco, illegal drugs, marijuana or alcohol within 24 hours of your surgery. [x] Jewelry, valuables or body piercing's should not be brought to the hospital. All body and/or tongue piercing's must be removed prior to arriving to hospital.  ALL hair pins must be removed. [x] Do not wear makeup, lotions, powders, deodorant. Nail polish as directed by the nurse. [x] Arrange transportation with a responsible adult  to and from the hospital. If you do not have a responsible adult  to transport you, you will need to make arrangements with a medical transportation company (i.e. [employer]. A Uber/taxi/bus is not appropriate unless you are accompanied by a responsible adult ). Arrange for someone to be with you for the remainder of the day and for 24 hours after your procedure due to having had anesthesia. Who will be your  for transportation? Lit[place], spouse  Who will be staying with you for 24 hrs after your procedure? Lit[place]her, spouse  [x] [employer] card and photo ID. [x] Transfusion Bracelet: Please bring with you to hospital, day of surgery         PARKING INSTRUCTIONS:   [x] Arrival Time: 0845,    Two people may accompany you. Everyone will need to wear a mask. [x] Parking lot '\"I\"  is located on [facility] (the corner of [facility] and [facility] [address] [facility]). To enter, press the button and the gate will lift. A free token will be provided to exit the lot. One car per patient is allowed to park in this lot.  All other cars are to park on [address] either in the parking garage or the handicap lot. Walk up the front walk to the [employer], the door will be locked an employee will greet you and let you in.                 [] To reach the [facility], upon entering the building the lobby take elevator B to the 3rd floor. EDUCATION INSTRUCTIONS:        [x] Pre-admission Testing educational folder given  [x] Incentive Spirometry,coughing & deep breathing exercises reviewed. [x]Medication information sheet(s)   [x]Fluoroscopy-Xray used in surgery reviewed with patient. Educational pamphlet placed in chart. [x]Pain: Post-op pain is normal and to be expected. You will be asked to rate your pain from 0-10 (a zero is not acceptable-education is needed). Your post-op pain goal is:   [x] Ask your nurse for your pain medication. MEDICATION INSTRUCTIONS:  [x]Bring a complete list of your medications, please write the last time you took the medicine, give this list to the nurse. [x] Take the following medications the morning of surgery with 1-2 ounces of water:  morphine 15 mg extended release, levetiracetam        [x] Stop herbal supplements and vitamins 5 days before your surgery. Stop ibuprofen (Nsaids) 7 days before your surgery. [x] Follow physician instructions regarding any blood thinners you may be taking. Aspirin last day 7/21/22    WHAT TO EXPECT:  [x] The day of surgery you will be greeted and checked in by the [facility] [employer]. Please bring your photo ID and insurance card. A nurse will greet you in accordance to the time you are needed in the pre-op area to prepare you for surgery. Please do not be discouraged if you are not greeted in the order you arrive as there are many variables that are involved in patient preparation. Your patience is greatly appreciated as you wait for your nurse.   Please bring in items such as: books, magazines, newspapers, electronics, or any other items  to occupy your time in the waiting area. [x]  Delays may occur with surgery and staff will make a sincere effort to keep you informed of delays. If any delays occur with your procedure, we apologize ahead of time for your inconvenience as we recognize the value of your time.

## 2022-07-19 ENCOUNTER — OFFICE VISIT (OUTPATIENT)
Dept: FAMILY MEDICINE CLINIC | Age: 72
End: 2022-07-19
Payer: MEDICARE

## 2022-07-19 VITALS
BODY MASS INDEX: 36.75 KG/M2 | TEMPERATURE: 98.8 F | HEART RATE: 66 BPM | WEIGHT: 271 LBS | DIASTOLIC BLOOD PRESSURE: 80 MMHG | OXYGEN SATURATION: 98 % | SYSTOLIC BLOOD PRESSURE: 138 MMHG

## 2022-07-19 DIAGNOSIS — I10 PRIMARY HYPERTENSION: Primary | ICD-10-CM

## 2022-07-19 DIAGNOSIS — I25.10 CORONARY ARTERY DISEASE INVOLVING NATIVE CORONARY ARTERY OF NATIVE HEART WITHOUT ANGINA PECTORIS: ICD-10-CM

## 2022-07-19 DIAGNOSIS — E74.39 GLUCOSE INTOLERANCE: ICD-10-CM

## 2022-07-19 DIAGNOSIS — M15.9 PRIMARY OSTEOARTHRITIS INVOLVING MULTIPLE JOINTS: ICD-10-CM

## 2022-07-19 DIAGNOSIS — R97.20 ELEVATED PSA: ICD-10-CM

## 2022-07-19 DIAGNOSIS — E29.1 HYPOGONADISM IN MALE: ICD-10-CM

## 2022-07-19 DIAGNOSIS — Z01.818 PRE-OP EXAM: ICD-10-CM

## 2022-07-19 DIAGNOSIS — E66.09 CLASS 1 OBESITY DUE TO EXCESS CALORIES WITH SERIOUS COMORBIDITY AND BODY MASS INDEX (BMI) OF 32.0 TO 32.9 IN ADULT: ICD-10-CM

## 2022-07-19 PROCEDURE — 99214 OFFICE O/P EST MOD 30 MIN: CPT | Performed by: INTERNAL MEDICINE

## 2022-07-19 PROCEDURE — 1123F ACP DISCUSS/DSCN MKR DOCD: CPT | Performed by: INTERNAL MEDICINE

## 2022-07-19 NOTE — PROGRESS NOTES
19  Mirta Marie : 1950 Sex: male  Age: 71 y.o. Chief Complaint   Patient presents with    Pre-op Exam     Having pre-testing         Patient is scheduled for placement of neurostimulator by Dr. Audelia Ervin on . Patient was recently seen by cardiology and clearance was given. Patient is not currently having any cardiac or respiratory symptoms. He has not had problems previously with general anesthesia. He does have full dentures. He has had no problems with intubation. He has had no strokelike symptoms. Blood pressure here is reasonably controlled. Denies GI or  symptomatology. Hypertension    Hyperlipidemia    Other      Review of Systems  Health Maintenance:  Colonoscopy - (2020) Anayeli diverticular dx  Colonoscopy Screening - (2017)  Couseled on Home Safety - (5/10/2017)  Physical Exam - (2022)  Prostate Exam - (2021)  Psa Test - (2022)  Rectal Exam - (2021)  EKG  Colonoscopy - (2017) Erika Raza  EGD - () VITO  Sleep Study - (2014)   Influenza Vaccination - ()  Prevnar Vaccine - (2018) SLIP GIVEN  Shingrix Vaccine (Shingles) - (2018) [address]   1. Obesity. 2. Hyperlipidemia. BEGAN SIMVISTATIN 2/1/10  3. HYPERTENSION BEGAN LISINOPRIL/HCT 2/1/10  4. Erectile dysfunction. 5. History of basal cell carcinoma. 6. History of previous sebaceous cyst.  7. OA HAD BILATERAL KNEE REPLACEMENTS  8. CHRONIC BACK PAIN- TASH EXERCISES RECOMMENDED 3/11  9. ESOPHAGITIS/PUD-  VITO  10. FEVER/DIARRHEA/RASH - PROBABLY VIRAL 12 ADMITTED TO [facility]- SEE REPORTS  11. HYPOGONADISM- MRI ORDERED 12  12. SNORING-FATIGUE- SLEEP APNEA- SLEEP STUDY ORDERED 10/13-CPAP BEGUN  13. LUMBAR RADICULOPATHY-8/17/15-FISHER/DONNY-SURGERY SCHEDULED 17  14. PNEUMONIA- 5/10/16-CXR CLEARED  15. CARDIOMEGALY-16-NORMAL ECHO 6/21/16 and  16. LOW BACK PAIN- REFERRED TO [facility] 18  17. OBESITY- GLP1 DISCUSSED 10/31/18  18. CHF-- referred to [facility]. Bilateral Carpal Tunnel- wrist splint   20. Abnormal stres- cath planned 12/10/2019  21. CVA-   22. Post laminectomy syndrome  [facility] Jhon  Surgical Hx:  Knee Replacement - () BILATERAL-  1. He had right shoulder surgery in .  2. He had a vasectomy at age 33.  1. He had bilateral knee replacements in . 4. Sebaceous cyst removal by Dr. Jasmin Blandon in . 5. Basal cell carcinoma by Dr. Sarwat Serna in . 6. LUMBAR FUSION 3/2021 Ayanna Dom  Reviewed and updated. FH:  Father:  Cerebrovascular Accident (CVA) - age 80. Mother:  Leukemia -  in her 29's. Reviewed, no changes. SH:  Marital: . Personal Habits: Cigarette Use: Former Cigarette Smoker 1 Pack Daily - for 30 years Negative For  current cigarette smoker. Alcohol: Occasionally consumes alcohol. Daily Caffeine: Consumes on  average 4 cups of hot tea per day - more recently 2 cups. Exercise Type: Unable to walk long distance. .  Reviewed, no changes. Date: 2022  Was the patient queried about smoking behavior? Yes   Does the patient currently smoke? Smoking: Patient is a former smoker.     Current Outpatient Medications:     morphine (MS CONTIN) 15 MG extended release tablet, TAKE ONE TABLET BY MOUTH EVERY 8 HOURS start 22 end 22, Disp: , Rfl:     levETIRAcetam (KEPPRA) 500 MG tablet, Take 1 tablet by mouth 2 times daily, Disp: 180 tablet, Rfl: 1    lisinopril (PRINIVIL;ZESTRIL) 20 MG tablet, Take 1 tablet by mouth daily (Patient taking differently: Take 20 mg by mouth at bedtime), Disp: 90 tablet, Rfl: 1    metoprolol succinate (TOPROL XL) 25 MG extended release tablet, Take 1 tablet by mouth daily (Patient taking differently: Take 25 mg by mouth at bedtime), Disp: 90 tablet, Rfl: 1    pravastatin (PRAVACHOL) 40 MG tablet, Take 1 tablet by mouth daily (Patient taking differently: Take 40 mg by mouth at bedtime), Disp: 90 tablet, Rfl: 1    oxyCODONE-acetaminophen (PERCOCET)  MG per tablet, TAKE ONE TABLET BY MOUTH THREE TIMES DAILY AS NEEDED FOR PAIN START 11/28 END 12/27/21, Disp: , Rfl:     vitamin C (ASCORBIC ACID) 500 MG tablet, Take 500 mg by mouth every other day, Disp: , Rfl:     aspirin 81 MG chewable tablet, Take 1 tablet by mouth daily, Disp: 30 tablet, Rfl: 3  Allergies   Allergen Reactions    Statins Other (See Comments)     Myalgias        Past Medical History:   Diagnosis Date    Basal cell carcinoma     CAD (coronary artery disease) 11/2019    Cardiomegaly 05/17/2016    Chronic back pain     Chronic back pain     CPAP (continuous positive airway pressure) dependence     Erectile dysfunction     Erectile dysfunction     Esophagitis 09/2011    VITO    H/O cardiomegaly     NORMAL ECHO IN 2016    H/O hypogonadism 2012    History of basal cell carcinoma     History of pneumonia 2016    History of sebaceous cyst     Hyperlipidemia     Hypertension     Hypogonadism in male 12/18/2012    Lumbar radiculopathy 08/17/2015    Obesity     Obesity     Osteoarthritis     Postoperative atrial fibrillation ([address] 12/24/2019    Seizure ([address] 05/29/2020    Sleep apnea     Stroke ([address] 11/2019    No residual     Past Surgical History:   Procedure Laterality Date    CARDIAC CATHETERIZATION  12/10/2019    scavina  CT surgery consult    CORONARY ARTERY BYPASS GRAFT N/A 12/23/2019    CABG CORONARY ARTERY BYPASS, SHANTELL performed by Arin Medrano MD at [address]  2005    [facility]    ESOPHGOSCOPY  9/10/2011    foreign body removal    JOINT REPLACEMENT Bilateral 2004    knees    KNEE SURGERY  2004    bilateral knee replacements    SHOULDER SURGERY  1997    right rotator cuff    SHOULDER SURGERY Right 1997    SKIN CANCER EXCISION  2003    CUDDAPAH    VASECTOMY      AGE 33     Family History   Problem Relation Age of Onset    Cancer Mother     Stroke Father     Stroke Sister     No Known Problems Brother     Stroke Sister     No Known Problems Sister     No Known Problems Sister     No Known Problems Sister      Social History     Socioeconomic History    Marital status:      Spouse name: Not on file    Number of children: 2    Years of education: Not on file    Highest education level: Not on file   Occupational History    Not on file   Tobacco Use    Smoking status: Former     Packs/day: 1.50     Years: 31.00     Pack years: 46.50     Types: Cigarettes     Start date: 1966     Quit date:      Years since quittin.5    Smokeless tobacco: Never   Vaping Use    Vaping Use: Never used   Substance and Sexual Activity    Alcohol use: Not Currently     Alcohol/week: 8.0 standard drinks     Types: 8 Drinks containing 0.5 oz of alcohol per week     Comment: drinks daily during summer months    Drug use: No    Sexual activity: Yes     Partners: Female   Other Topics Concern    Not on file   Social History Narrative    Not on file     Social Determinants of Health     Financial Resource Strain: Not on file   Food Insecurity: Not on file   Transportation Needs: Not on file   Physical Activity: Not on file   Stress: Not on file   Social Connections: Not on file   Intimate Partner Violence: Not on file   Housing Stability: Not on file       Vitals:    22 1056   BP: 138/80   Pulse: 66   Temp: 98.8 °F (37.1 °C)   SpO2: 98%   Weight: 271 lb (122.9 kg)       Physical Exam  Exam:  Const: Appears healthy,well developed and well nourished. Appears obese. Eyes: EOMI in both eyes. PERRL. ENMT: External canals are clear and dry. Tympanic membranes: thickening. External nose WNL. Neck: Supple and symmetric. Palpation reveals no adenopathy. No masses appreciated. Thyroid  exhibits no nodules or thyromegaly. No JVD. Resp: Respirations are unlabored. Respiration rate is normal. Auscultate good airflow. No rales,  rhonchi or wheezes appreciated over the lungs bilaterally. CV: Rhythm is regular. S1 is normal. S2 is normal. No heart murmur appreciated. Carotids: no  bruits.  Abdominal aorta is

## 2022-07-22 ENCOUNTER — HOSPITAL ENCOUNTER (OUTPATIENT)
Dept: PREADMISSION TESTING | Age: 72
Discharge: HOME | End: 2022-07-22
Payer: MEDICARE

## 2022-07-22 ENCOUNTER — HOSPITAL ENCOUNTER (OUTPATIENT)
Dept: GENERAL RADIOLOGY | Age: 72
Discharge: HOME | End: 2022-07-24
Payer: MEDICARE

## 2022-07-22 VITALS
RESPIRATION RATE: 20 BRPM | DIASTOLIC BLOOD PRESSURE: 89 MMHG | SYSTOLIC BLOOD PRESSURE: 156 MMHG | OXYGEN SATURATION: 94 % | HEART RATE: 61 BPM | TEMPERATURE: 98.1 F

## 2022-07-22 DIAGNOSIS — Z01.818 PRE-OP TESTING: ICD-10-CM

## 2022-07-22 DIAGNOSIS — Z01.812 PRE-OPERATIVE LABORATORY EXAMINATION: ICD-10-CM

## 2022-07-22 LAB
ABO/RH: NORMAL
ANION GAP SERPL CALCULATED.3IONS-SCNC: 13 MMOL/L (ref 7–16)
ANTIBODY SCREEN: NORMAL
BASOPHILS ABSOLUTE: 0.05 E9/L (ref 0–0.2)
BASOPHILS RELATIVE PERCENT: 0.6 % (ref 0–2)
BILIRUBIN URINE: NEGATIVE
BLOOD, URINE: NEGATIVE
BUN BLDV-MCNC: 14 MG/DL (ref 6–23)
CALCIUM SERPL-MCNC: 9.3 MG/DL (ref 8.6–10.2)
CHLORIDE BLD-SCNC: 100 MMOL/L (ref 98–107)
CLARITY: CLEAR
CO2: 25 MMOL/L (ref 22–29)
COLOR: YELLOW
CREAT SERPL-MCNC: 0.8 MG/DL (ref 0.7–1.2)
EOSINOPHILS ABSOLUTE: 0.38 E9/L (ref 0.05–0.5)
EOSINOPHILS RELATIVE PERCENT: 4.9 % (ref 0–6)
GFR AFRICAN AMERICAN: >60
GFR NON-AFRICAN AMERICAN: >60 ML/MIN/1.73
GLUCOSE BLD-MCNC: 106 MG/DL (ref 74–99)
GLUCOSE URINE: NEGATIVE MG/DL
HCT VFR BLD CALC: 47.9 % (ref 37–54)
HEMOGLOBIN: 15.8 G/DL (ref 12.5–16.5)
IMMATURE GRANULOCYTES #: 0.03 E9/L
IMMATURE GRANULOCYTES %: 0.4 % (ref 0–5)
INR BLD: 1
KETONES, URINE: NEGATIVE MG/DL
LEUKOCYTE ESTERASE, URINE: NEGATIVE
LYMPHOCYTES ABSOLUTE: 1.37 E9/L (ref 1.5–4)
LYMPHOCYTES RELATIVE PERCENT: 17.7 % (ref 20–42)
MCH RBC QN AUTO: 33.3 PG (ref 26–35)
MCHC RBC AUTO-ENTMCNC: 33 % (ref 32–34.5)
MCV RBC AUTO: 101.1 FL (ref 80–99.9)
MONOCYTES ABSOLUTE: 0.86 E9/L (ref 0.1–0.95)
MONOCYTES RELATIVE PERCENT: 11.1 % (ref 2–12)
NEUTROPHILS ABSOLUTE: 5.04 E9/L (ref 1.8–7.3)
NEUTROPHILS RELATIVE PERCENT: 65.3 % (ref 43–80)
NITRITE, URINE: NEGATIVE
PDW BLD-RTO: 12.4 FL (ref 11.5–15)
PH UA: 6 (ref 5–9)
PLATELET # BLD: 257 E9/L (ref 130–450)
PMV BLD AUTO: 11.3 FL (ref 7–12)
POTASSIUM REFLEX MAGNESIUM: 4.4 MMOL/L (ref 3.5–5)
PROTEIN UA: NEGATIVE MG/DL
PROTHROMBIN TIME: 11.5 SEC (ref 9.3–12.4)
RBC # BLD: 4.74 E12/L (ref 3.8–5.8)
SODIUM BLD-SCNC: 138 MMOL/L (ref 132–146)
SPECIFIC GRAVITY UA: 1.02 (ref 1–1.03)
UROBILINOGEN, URINE: 0.2 E.U./DL
WBC # BLD: 7.7 E9/L (ref 4.5–11.5)

## 2022-07-22 PROCEDURE — 81003 URINALYSIS AUTO W/O SCOPE: CPT

## 2022-07-22 PROCEDURE — 80048 BASIC METABOLIC PNL TOTAL CA: CPT

## 2022-07-22 PROCEDURE — 86900 BLOOD TYPING SEROLOGIC ABO: CPT

## 2022-07-22 PROCEDURE — 86901 BLOOD TYPING SEROLOGIC RH(D): CPT

## 2022-07-22 PROCEDURE — 85025 COMPLETE CBC W/AUTO DIFF WBC: CPT

## 2022-07-22 PROCEDURE — 71046 X-RAY EXAM CHEST 2 VIEWS: CPT

## 2022-07-22 PROCEDURE — 85610 PROTHROMBIN TIME: CPT

## 2022-07-22 PROCEDURE — 86850 RBC ANTIBODY SCREEN: CPT

## 2022-07-22 PROCEDURE — 36415 COLL VENOUS BLD VENIPUNCTURE: CPT

## 2022-07-22 PROCEDURE — 87088 URINE BACTERIA CULTURE: CPT

## 2022-07-24 LAB — URINE CULTURE, ROUTINE: NORMAL

## 2022-07-27 ENCOUNTER — ANESTHESIA (OUTPATIENT)
Dept: MRI IMAGING | Age: 72
End: 2022-07-27

## 2022-07-27 ENCOUNTER — HOSPITAL ENCOUNTER (OUTPATIENT)
Dept: MRI IMAGING | Age: 72
Discharge: HOME | End: 2022-07-29
Payer: MEDICARE

## 2022-07-27 ENCOUNTER — ANESTHESIA EVENT (OUTPATIENT)
Dept: MRI IMAGING | Age: 72
End: 2022-07-27

## 2022-07-27 VITALS
TEMPERATURE: 97 F | HEART RATE: 60 BPM | DIASTOLIC BLOOD PRESSURE: 86 MMHG | RESPIRATION RATE: 18 BRPM | OXYGEN SATURATION: 94 % | SYSTOLIC BLOOD PRESSURE: 144 MMHG

## 2022-07-27 DIAGNOSIS — M54.50 CHRONIC MIDLINE LOW BACK PAIN WITHOUT SCIATICA: ICD-10-CM

## 2022-07-27 DIAGNOSIS — G89.29 CHRONIC MIDLINE LOW BACK PAIN WITHOUT SCIATICA: ICD-10-CM

## 2022-07-27 PROCEDURE — 2580000003 HC RX 258: Performed by: NURSE ANESTHETIST, CERTIFIED REGISTERED

## 2022-07-27 PROCEDURE — 3700000000 HC ANESTHESIA ATTENDED CARE

## 2022-07-27 PROCEDURE — 72146 MRI CHEST SPINE W/O DYE: CPT

## 2022-07-27 PROCEDURE — 36591 DRAW BLOOD OFF VENOUS DEVICE: CPT

## 2022-07-27 PROCEDURE — 6360000002 HC RX W HCPCS: Performed by: NURSE ANESTHETIST, CERTIFIED REGISTERED

## 2022-07-27 PROCEDURE — 3700000001 HC ADD 15 MINUTES (ANESTHESIA)

## 2022-07-27 PROCEDURE — 7100000011 HC PHASE II RECOVERY - ADDTL 15 MIN

## 2022-07-27 PROCEDURE — 7100000010 HC PHASE II RECOVERY - FIRST 15 MIN

## 2022-07-27 PROCEDURE — 36415 COLL VENOUS BLD VENIPUNCTURE: CPT

## 2022-07-27 RX ORDER — MIDAZOLAM HYDROCHLORIDE 1 MG/ML
INJECTION INTRAMUSCULAR; INTRAVENOUS PRN
Status: DISCONTINUED | OUTPATIENT
Start: 2022-07-27 | End: 2022-07-27 | Stop reason: SDUPTHER

## 2022-07-27 RX ORDER — FENTANYL CITRATE 50 UG/ML
INJECTION, SOLUTION INTRAMUSCULAR; INTRAVENOUS PRN
Status: DISCONTINUED | OUTPATIENT
Start: 2022-07-27 | End: 2022-07-27 | Stop reason: SDUPTHER

## 2022-07-27 RX ORDER — SODIUM CHLORIDE 9 MG/ML
INJECTION, SOLUTION INTRAVENOUS CONTINUOUS PRN
Status: DISCONTINUED | OUTPATIENT
Start: 2022-07-27 | End: 2022-07-27 | Stop reason: SDUPTHER

## 2022-07-27 RX ADMIN — SODIUM CHLORIDE: 9 INJECTION, SOLUTION INTRAVENOUS at 07:58

## 2022-07-27 RX ADMIN — FENTANYL CITRATE 50 MCG: 50 INJECTION INTRAMUSCULAR; INTRAVENOUS at 08:03

## 2022-07-27 RX ADMIN — FENTANYL CITRATE 50 MCG: 50 INJECTION INTRAMUSCULAR; INTRAVENOUS at 08:07

## 2022-07-27 RX ADMIN — MIDAZOLAM 2 MG: 1 INJECTION INTRAMUSCULAR; INTRAVENOUS at 08:03

## 2022-07-27 RX ADMIN — MIDAZOLAM 2 MG: 1 INJECTION INTRAMUSCULAR; INTRAVENOUS at 08:06

## 2022-07-27 RX ADMIN — FENTANYL CITRATE 50 MCG: 50 INJECTION INTRAMUSCULAR; INTRAVENOUS at 08:29

## 2022-07-27 RX ADMIN — FENTANYL CITRATE 50 MCG: 50 INJECTION INTRAMUSCULAR; INTRAVENOUS at 08:32

## 2022-07-27 NOTE — ANESTHESIA POSTPROCEDURE EVALUATION
Department of Anesthesiology  Postprocedure Note    Patient: Cruz Wallis  MRN: [id number]  [place]: 1950  Date of evaluation: 7/27/2022      Procedure Summary     Date: 07/27/22 Room / Location: [address]; [address] [place] [address] Procedures; [address] Radiology    Anesthesia Start: 3894 Anesthesia Stop: 1607    Procedure: MRI THORACIC SPINE WO CONTRAST Diagnosis:       Chronic midline low back pain without sciatica      (r/o thoracic stenosis for spinal cord stimulator placement)    Scheduled Providers: DESTINEE Cardoso - CRNA Responsible Provider: Chantale Watkins MD    Anesthesia Type: MAC ASA Status: 4          Anesthesia Type: No value filed.     [place] Phase I:      [place] Phase II:        Anesthesia Post Evaluation    Patient location during evaluation: PACU  Patient participation: complete - patient participated  Level of consciousness: awake and alert  Airway patency: patent  Nausea & Vomiting: no nausea and no vomiting  Complications: no  Cardiovascular status: blood pressure returned to baseline and hemodynamically stable  Respiratory status: acceptable and spontaneous ventilation  Hydration status: euvolemic  Multimodal analgesia pain management approach

## 2022-07-27 NOTE — ANESTHESIA PRE PROCEDURE
Department of Anesthesiology  Preprocedure Note       Name:  Juana Ding   Age:  71 y.o.  :  1950                                          MRN:  [id number]         Date:  2022      Surgeon: * Surgery not found *    Procedure: MRI THORACIC SPINE WO CONTRAST    Medications prior to admission:   Prior to Admission medications    Medication Sig Start Date End Date Taking?  Authorizing Provider   morphine (MS CONTIN) 15 MG extended release tablet TAKE ONE TABLET BY MOUTH EVERY 8 HOURS start 22 end 22   Historical Provider, MD   levETIRAcetam (KEPPRA) 500 MG tablet Take 1 tablet by mouth 2 times daily 22   John Granger MD   lisinopril (PRINIVIL;ZESTRIL) 20 MG tablet Take 1 tablet by mouth daily  Patient taking differently: Take 20 mg by mouth at bedtime 22   John Granger MD   metoprolol succinate (TOPROL XL) 25 MG extended release tablet Take 1 tablet by mouth daily  Patient taking differently: Take 25 mg by mouth at bedtime 22   John Granger MD   pravastatin (PRAVACHOL) 40 MG tablet Take 1 tablet by mouth daily  Patient taking differently: Take 40 mg by mouth at bedtime 22   John Granger MD   oxyCODONE-acetaminophen (PERCOCET)  MG per tablet TAKE ONE TABLET BY MOUTH THREE TIMES DAILY AS NEEDED FOR PAIN START  END 21   Historical Provider, MD   vitamin C (ASCORBIC ACID) 500 MG tablet Take 500 mg by mouth every other day    Historical Provider, MD   aspirin 81 MG chewable tablet Take 1 tablet by mouth daily 19   Leisa Barreto MD       Current medications:    Current Outpatient Medications   Medication Sig Dispense Refill    morphine (MS CONTIN) 15 MG extended release tablet TAKE ONE TABLET BY MOUTH EVERY 8 HOURS start 22 end 22      levETIRAcetam (KEPPRA) 500 MG tablet Take 1 tablet by mouth 2 times daily 180 tablet 1    lisinopril (PRINIVIL;ZESTRIL) 20 MG tablet Take 1 tablet by mouth daily (Patient taking differently: Take 20 mg by mouth at bedtime) 90 tablet 1    metoprolol succinate (TOPROL XL) 25 MG extended release tablet Take 1 tablet by mouth daily (Patient taking differently: Take 25 mg by mouth at bedtime) 90 tablet 1    pravastatin (PRAVACHOL) 40 MG tablet Take 1 tablet by mouth daily (Patient taking differently: Take 40 mg by mouth at bedtime) 90 tablet 1    oxyCODONE-acetaminophen (PERCOCET)  MG per tablet TAKE ONE TABLET BY MOUTH THREE TIMES DAILY AS NEEDED FOR PAIN START 11/28 END 12/27/21      vitamin C (ASCORBIC ACID) 500 MG tablet Take 500 mg by mouth every other day      aspirin 81 MG chewable tablet Take 1 tablet by mouth daily 30 tablet 3     No current facility-administered medications for this visit. Allergies:     Allergies   Allergen Reactions    Statins Other (See Comments)     Myalgias        Problem List:    Patient Active Problem List   Diagnosis Code    Hypertension I10    Primary osteoarthritis involving multiple joints M89.49    Hyperlipidemia E78.5    Obesity E66.9    Bilateral carpal tunnel syndrome G56.03    Erectile dysfunction N52.9    Sleep apnea G47.30    Hypogonadism in male E29.1    Osteoarthritis M19.90    Pleural effusion J90    Postoperative atrial fibrillation (HCC) I97.89, I48.91    Iron deficiency anemia due to chronic blood loss D50.0    Chronic fatigue R53.82    Radiculopathy M54.10    Elevated PSA R97.20    Hx of lumbosacral spine surgery Z98.890    Subarachnoid hemorrhage (HCC) I60.9    Seizure as late effect of cerebrovascular accident (CVA) ([address] I69.398, R56.9    Acute diverticulitis K57.92    CPAP (continuous positive airway pressure) dependence Z99.89    Chronic back pain M54.9, G89.29    Cardiomegaly I51.7    CAD (coronary artery disease) I25.10    Abnormal finding of blood chemistry, unspecified  R79.9    Glucose intolerance E74.39    Hx of seizure disorder Z86.69    Postlaminectomy syndrome M96.1    Pre-op exam Z01.818       Past Medical History:        Diagnosis Date    Basal cell carcinoma     CAD (coronary artery disease) 2019    Cardiomegaly 2016    Chronic back pain     Chronic back pain     CPAP (continuous positive airway pressure) dependence     Erectile dysfunction     Erectile dysfunction     Esophagitis 2011    VITO    H/O cardiomegaly     NORMAL ECHO IN 2016    H/O hypogonadism 2012    History of basal cell carcinoma     History of pneumonia 2016    History of sebaceous cyst     Hyperlipidemia     Hypertension     Hypogonadism in male 2012    Lumbar radiculopathy 2015    Obesity     Obesity     Osteoarthritis     Postoperative atrial fibrillation ([address] 2019    Seizure ([address] 2020    Sleep apnea     Stroke ([address] 2019    No residual       Past Surgical History:        Procedure Laterality Date    CARDIAC CATHETERIZATION  12/10/2019    scavina  CT surgery consult    CORONARY ARTERY BYPASS GRAFT N/A 2019    CABG CORONARY ARTERY BYPASS, SHANTELL performed by Sergio Gold MD at [address]  [address]    [address] [place] [address] ESOPHGOSCOPY  9/10/2011    foreign body removal    JOINT REPLACEMENT Bilateral 2004    knees    KNEE SURGERY  2004    bilateral knee replacements    SHOULDER SURGERY  1997    right rotator cuff    SHOULDER SURGERY Right 1997    SKIN CANCER EXCISION      [address]    VASECTOMY      AGE 33       Social History:    Social History     Tobacco Use    Smoking status: Former     Packs/day: 1.50     Years: 31.00     Pack years: 46.50     Types: Cigarettes     Start date: 1966     Quit date:      Years since quittin.5    Smokeless tobacco: Never   Substance Use Topics    Alcohol use: Not Currently     Alcohol/week: 8.0 standard drinks     Types: 8 Drinks containing 0.5 oz of alcohol per week     Comment: drinks daily during summer months                                Counseling given: Not Answered      Vital Signs (Current):    There were no vitals filed for this visit. BP Readings from Last 3 Encounters:   07/22/22 (!) 156/89   07/19/22 138/80   07/01/22 (!) 151/81       NPO Status:  > 8 HOURS                                                                               BMI:   Wt Readings from Last 3 Encounters:   07/19/22 271 lb (122.9 kg)   07/01/22 270 lb (122.5 kg)   06/22/22 270 lb (122.5 kg)     There is no height or weight on file to calculate BMI.    CBC:   Lab Results   Component Value Date/Time    WBC 7.7 07/22/2022 08:45 AM    RBC 4.74 07/22/2022 08:45 AM    HGB 15.8 07/22/2022 08:45 AM    HCT 47.9 07/22/2022 08:45 AM    .1 07/22/2022 08:45 AM    RDW 12.4 07/22/2022 08:45 AM     07/22/2022 08:45 AM       CMP:   Lab Results   Component Value Date/Time     07/22/2022 08:45 AM    K 4.4 07/22/2022 08:45 AM     07/22/2022 08:45 AM    CO2 25 07/22/2022 08:45 AM    BUN 14 07/22/2022 08:45 AM    CREATININE 0.8 07/22/2022 08:45 AM    GFRAA >60 07/22/2022 08:45 AM    AGRATIO 1.1 05/29/2020 12:42 PM    LABGLOM >60 07/22/2022 08:45 AM    GLUCOSE 106 07/22/2022 08:45 AM    GLUCOSE 100 01/20/2012 05:00 AM    PROT 7.7 06/22/2022 11:49 AM    CALCIUM 9.3 07/22/2022 08:45 AM    BILITOT 0.3 06/22/2022 11:49 AM    ALKPHOS 82 06/22/2022 11:49 AM    AST 41 06/22/2022 11:49 AM    ALT 41 06/22/2022 11:49 AM       POC Tests: No results for input(s): POCGLU, POCNA, POCK, POCCL, POCBUN, POCHEMO, POCHCT in the last 72 hours.     Coags:   Lab Results   Component Value Date/Time    PROTIME 11.5 07/22/2022 08:45 AM    INR 1.0 07/22/2022 08:45 AM    APTT 30.4 12/23/2019 12:45 PM       HCG (If Applicable): No results found for: PREGTESTUR, PREGSERUM, HCG, HCGQUANT     ABGs:   Lab Results   Component Value Date/Time    PO2ART 65.3 12/23/2019 12:15 PM    OZU7AGK 46.2 12/23/2019 12:15 PM    UDA6KNG 25.0 12/23/2019 12:15 PM        Type & Screen (If Applicable):  No results found for: Coy Duffy 12/19/19 US CAROTIDS    Impression   Atherosclerotic disease. No hemodynamically significant stenosis is   identified   Estimated stenosis by NASCET criteria in the proximal right carotid   artery is between 0% and 49%. Estimated stenosis by NASCET criteria in the proximal left carotid   artery is between 0% and 49%.         12/24/21 EKG  NSR    12/10/19 ECHO     Findings      Left Ventricle   Left ventricle is mildly enlarged . Mild concentric left ventricular hypertrophy. Ejection fraction is visually estimated at 50%. \   Apical LV hypokinesia   Normal diastolic function. Right Ventricle   Normal right ventricular size and function. Left Atrium   The left atrium is mildly dilated. Interatrial septum appears intact. Right Atrium   Normal right atrium size. Mitral Valve   Normal mitral valve structure and function. No evidence of mitral valve stenosis. No systolic mitral regurgitation noted. Tricuspid Valve   The tricuspid valve appears structurally normal.   Physiologic and/or trace tricuspid regurgitation. RVSP is normal.      Aortic Valve   Structurally normal aortic valve. No evidence of aortic valve regurgitation. No hemodynamically significant aortic stenosis is present. Pulmonic Valve   The pulmonic valve was not well visualized. Pericardial Effusion   No evidence of pericardial effusion. Aorta   Aortic root dimension within normal limits. Conclusions      Summary   Ejection fraction is visually estimated at 50%. Apical LV hypokinesia   Normal diastolic function.       Signature      ----------------------------------------------------------------   Electronically signed by Rafia Martinez MD(Interpreting   physician) on 12/10/2019 12:59 PM    Anesthesia Evaluation  Patient summary reviewed and Nursing notes reviewed no history of anesthetic complications:   Airway: Mallampati: II  TM distance: <3 FB   Neck ROM: limited  Comment: Limited ROM of neck   Mouth opening: > = 3 FB   Dental:    (+) upper dentures and lower dentures  Comment: edentulous    Pulmonary: breath sounds clear to auscultation  (+) sleep apnea: on CPAP,                            ROS comment: Former smoker 2 ppd, quit in 1997    Cardiovascular:  Exercise tolerance: poor (<4 METS),   (+) hypertension:, CAD: obstructive, CABG/stent (CABG 2019):, dysrhythmias: atrial fibrillation, hyperlipidemia      ECG reviewed  Rhythm: regular  Rate: normal  Echocardiogram reviewed         Beta Blocker:  Dose within 24 Hrs         Neuro/Psych:   (+) seizures:, CVA (Stroke November 2019):, neuromuscular disease (bilateral carpal tunnel ):,              ROS comment: S/p L4-L5 surgery  Patient reports chronic lower back pain with numbness and tingling in BLE    Denies any residual deficit after CVA   GI/Hepatic/Renal:   (+) GERD: well controlled, renal disease: CRI,           Endo/Other:    (+) blood dyscrasia (aspirin): anticoagulation therapy and anemia, arthritis: OA., malignancy/cancer (hx: basal cell CA). Pt had PAT visit. Abdominal:   (+) obese,           Vascular: negative vascular ROS. Other Findings:             Anesthesia Plan      MAC     ASA 4       Induction: intravenous. Anesthetic plan and risks discussed with patient. Plan discussed with attending.                     DESTINEE Garcia - CRNA   7/27/2022

## 2022-07-27 NOTE — PROGRESS NOTES
0845: Patient returned from procedure. Patient stable. No s/s of complications noted or reported. Vitals will be checked q 15min, see flow sheets. 8205: Patient sitting up on side of bed drinking with no complications. 0915: Patient discharged. Discharge papers reviewed with patient, questions answered, discharge paper signed. Patient ambulated to door with spouse. Patient in stable condition, no s/s of complications noted or reported.

## 2022-07-28 ENCOUNTER — ANESTHESIA EVENT (OUTPATIENT)
Dept: OPERATING ROOM | Age: 72
End: 2022-07-28
Payer: MEDICARE

## 2022-07-28 NOTE — H&P
Junito Rashid (:  1950) is a 71 y.o. male,New patient, here for evaluation of the following chief complaint(s):  New Patient (back pain is constant, pt has had PT @ [facility] in [place] and injections @ Dr Pfeiffer [facility]  in the past years and none [facility])        ASSESSMENT/PLAN:  1. Chronic midline low back pain without sciatica  71year old male who presents with failed back surgery syndrome. He has back pain that is refractory to medications and physical therapy. He had a spinal cord stimulator trial with [facility] at T8 with over 70% improvement in his pain. I will schedule him for a permanent stimulator placement at T8 but he will need a thoracic MRI first but due to his claustrophobia, he needs an MRI with anesthesia     No follow-ups on file. Subjective   SUBJECTIVE/OBJECTIVE:  HPI  71year old male who presents with back pain. The pain is described as sharp,  Stabbing, aching and burning. The pain is rated as 8/10. It is made worse with activity and better with rest. He denies any new numbness, tingling or weakness. He denies loss of control of bowel or bladder function. He has had a prior lumbar fusion.     Past Medical History:   Diagnosis Date    Basal cell carcinoma     CAD (coronary artery disease) 2019    Cardiomegaly 2016    Chronic back pain     Chronic back pain     CPAP (continuous positive airway pressure) dependence     Erectile dysfunction     Erectile dysfunction     Esophagitis 2011    VITO    H/O cardiomegaly     NORMAL ECHO IN 2016    H/O hypogonadism     History of basal cell carcinoma     History of pneumonia 2016    History of sebaceous cyst     Hyperlipidemia     Hypertension     Hypogonadism in male 2012    Lumbar radiculopathy 2015    Obesity     Obesity     Osteoarthritis     Postoperative atrial fibrillation ([address] 2019    Seizure ([address] 2020    Sleep apnea     Stroke ([address] 2019    No residual     Past Surgical History:   Procedure Laterality Date    CARDIAC CATHETERIZATION  12/10/2019    scavina  CT surgery consult    CORONARY ARTERY BYPASS GRAFT N/A 12/23/2019    CABG CORONARY ARTERY BYPASS, SHANTELL performed by Hilda Alfaro MD at [address]  [address] [place] [address] 94    ESOPHGOSCOPY  9/10/2011    foreign body removal    JOINT REPLACEMENT Bilateral 2004    knees    KNEE SURGERY  2004    bilateral knee replacements    SHOULDER SURGERY  1997    right rotator cuff    SHOULDER SURGERY Right 1997    SKIN CANCER EXCISION  2003    CUDDAPAH    VASECTOMY      AGE 33     Family History   Problem Relation Age of Onset    Cancer Mother     Stroke Father     Stroke Sister     No Known Problems Brother     Stroke Sister     No Known Problems Sister     No Known Problems Sister     No Known Problems Sister      Scheduled Meds:  Continuous Infusions:  PRN Meds:. Allergies   Allergen Reactions    Statins Other (See Comments)     Myalgias           Review of Systems  Constitutional: Negative. HENT: Negative. Eyes: Negative. Respiratory: Negative. Cardiovascular: Negative. Gastrointestinal: Negative. Endocrine: Negative. Genitourinary: Negative. Musculoskeletal: Negative. Allergic/Immunologic: Negative. Neurological: Negative. Hematological: Negative. Psychiatric/Behavioral: Negative. Objective   Physical Exam  Vitals reviewed. Constitutional:       General: He is not in acute distress. Appearance: Normal appearance. He is normal weight. He is not ill-appearing, toxic-appearing or diaphoretic. HENT:     Head: Normocephalic and atraumatic. Eyes:     General: No visual field deficit or scleral icterus. Right eye: No discharge. Left eye: No discharge. Extraocular Movements: Extraocular movements intact. Conjunctiva/sclera: Conjunctivae normal.      Pupils: Pupils are equal, round, and reactive to light.    Pulmonary:     Effort: Pulmonary effort is normal. No respiratory distress. Abdominal:      General: Abdomen is flat. There is no distension. Musculoskeletal:         General: No swelling, tenderness, deformity or signs of injury. Normal range of motion. Right lower leg: No edema. Skin:     General: Skin is warm and dry. Capillary Refill: Capillary refill takes less than 2 seconds. Coloration: Skin is not jaundiced or pale. Findings: No bruising, erythema, lesion or rash. Neurological:     General: No focal deficit present. Mental Status: He is alert and oriented to person, place, and time. Mental status is at baseline. GCS: GCS eye subscore is 4. GCS verbal subscore is 5. GCS motor subscore is 6. Cranial Nerves: Cranial nerves are intact. No cranial nerve deficit, dysarthria or facial asymmetry. Sensory: Sensation is intact. No sensory deficit. Motor: Motor function is intact. No weakness, tremor, atrophy, abnormal muscle tone, seizure activity or pronator drift. Coordination: Coordination is intact. Romberg sign negative. Coordination normal. Finger-Nose-Finger Test and Heel to [facility] Test normal. Rapid alternating movements normal.      Gait: Gait normal.      Deep Tendon Reflexes: Reflexes normal. Babinski sign absent on the right side. Babinski sign absent on the left side. Reflex Scores:       Tricep reflexes are 2+ on the right side and 2+ on the left side. Bicep reflexes are 2+ on the right side and 2+ on the left side. Brachioradialis reflexes are 2+ on the right side and 2+ on the left side. Patellar reflexes are 2+ on the right side and 2+ on the left side. Achilles reflexes are 2+ on the right side and 2+ on the left side. Psychiatric:         Mood and Affect: Mood normal.         Behavior: Behavior normal.         Thought Content:  Thought content normal.         Judgment: Judgment normal.

## 2022-07-28 NOTE — PROGRESS NOTES
Pt notified of surgery time change for tomorrow 7/29/22. Arrival time is now 0500. Pt verbalized understanding.   Glenroy Maria RN

## 2022-07-29 ENCOUNTER — APPOINTMENT (OUTPATIENT)
Dept: GENERAL RADIOLOGY | Age: 72
End: 2022-07-29
Attending: NEUROLOGICAL SURGERY
Payer: MEDICARE

## 2022-07-29 ENCOUNTER — ANESTHESIA (OUTPATIENT)
Dept: OPERATING ROOM | Age: 72
End: 2022-07-29
Payer: MEDICARE

## 2022-07-29 ENCOUNTER — HOSPITAL ENCOUNTER (OUTPATIENT)
Age: 72
Setting detail: OUTPATIENT SURGERY
Discharge: HOME | End: 2022-07-29
Attending: NEUROLOGICAL SURGERY | Admitting: NEUROLOGICAL SURGERY
Payer: MEDICARE

## 2022-07-29 VITALS
HEIGHT: 72 IN | BODY MASS INDEX: 36.84 KG/M2 | HEART RATE: 74 BPM | RESPIRATION RATE: 17 BRPM | WEIGHT: 272 LBS | OXYGEN SATURATION: 98 % | DIASTOLIC BLOOD PRESSURE: 60 MMHG | SYSTOLIC BLOOD PRESSURE: 147 MMHG | TEMPERATURE: 97.2 F

## 2022-07-29 DIAGNOSIS — Z01.812 PRE-OPERATIVE LABORATORY EXAMINATION: Primary | ICD-10-CM

## 2022-07-29 DIAGNOSIS — M54.9 BACK PAIN, UNSPECIFIED BACK LOCATION, UNSPECIFIED BACK PAIN LATERALITY, UNSPECIFIED CHRONICITY: ICD-10-CM

## 2022-07-29 PROBLEM — M96.1 FAILED BACK SURGICAL SYNDROME: Status: ACTIVE | Noted: 2022-07-29

## 2022-07-29 PROCEDURE — 6360000002 HC RX W HCPCS: Performed by: ANESTHESIOLOGY

## 2022-07-29 PROCEDURE — 2720000010 HC SURG SUPPLY STERILE: Performed by: NEUROLOGICAL SURGERY

## 2022-07-29 PROCEDURE — 3700000001 HC ADD 15 MINUTES (ANESTHESIA): Performed by: NEUROLOGICAL SURGERY

## 2022-07-29 PROCEDURE — A4217 STERILE WATER/SALINE, 500 ML: HCPCS | Performed by: NEUROLOGICAL SURGERY

## 2022-07-29 PROCEDURE — 7100000010 HC PHASE II RECOVERY - FIRST 15 MIN: Performed by: NEUROLOGICAL SURGERY

## 2022-07-29 PROCEDURE — 63655 IMPLANT NEUROELECTRODES: CPT | Performed by: NEUROLOGICAL SURGERY

## 2022-07-29 PROCEDURE — 7100000001 HC PACU RECOVERY - ADDTL 15 MIN: Performed by: NEUROLOGICAL SURGERY

## 2022-07-29 PROCEDURE — C1767 GENERATOR, NEURO NON-RECHARG: HCPCS | Performed by: NEUROLOGICAL SURGERY

## 2022-07-29 PROCEDURE — 3600000013 HC SURGERY LEVEL 3 ADDTL 15MIN: Performed by: NEUROLOGICAL SURGERY

## 2022-07-29 PROCEDURE — 6360000002 HC RX W HCPCS: Performed by: NEUROLOGICAL SURGERY

## 2022-07-29 PROCEDURE — C1787 PATIENT PROGR, NEUROSTIM: HCPCS | Performed by: NEUROLOGICAL SURGERY

## 2022-07-29 PROCEDURE — 2580000003 HC RX 258: Performed by: PHYSICIAN ASSISTANT

## 2022-07-29 PROCEDURE — 3600000003 HC SURGERY LEVEL 3 BASE: Performed by: NEUROLOGICAL SURGERY

## 2022-07-29 PROCEDURE — 2580000003 HC RX 258: Performed by: NEUROLOGICAL SURGERY

## 2022-07-29 PROCEDURE — 2500000003 HC RX 250 WO HCPCS: Performed by: NEUROLOGICAL SURGERY

## 2022-07-29 PROCEDURE — C1713 ANCHOR/SCREW BN/BN,TIS/BN: HCPCS | Performed by: NEUROLOGICAL SURGERY

## 2022-07-29 PROCEDURE — 3700000000 HC ANESTHESIA ATTENDED CARE: Performed by: NEUROLOGICAL SURGERY

## 2022-07-29 PROCEDURE — 63685 INS/RPLC SPI NPG/RCVR POCKET: CPT | Performed by: NEUROLOGICAL SURGERY

## 2022-07-29 PROCEDURE — 7100000000 HC PACU RECOVERY - FIRST 15 MIN: Performed by: NEUROLOGICAL SURGERY

## 2022-07-29 PROCEDURE — 6360000002 HC RX W HCPCS

## 2022-07-29 PROCEDURE — C1778 LEAD, NEUROSTIMULATOR: HCPCS | Performed by: NEUROLOGICAL SURGERY

## 2022-07-29 PROCEDURE — 3209999900 FLUORO FOR SURGICAL PROCEDURES

## 2022-07-29 PROCEDURE — 2500000003 HC RX 250 WO HCPCS

## 2022-07-29 PROCEDURE — 6360000002 HC RX W HCPCS: Performed by: PHYSICIAN ASSISTANT

## 2022-07-29 PROCEDURE — 7100000011 HC PHASE II RECOVERY - ADDTL 15 MIN: Performed by: NEUROLOGICAL SURGERY

## 2022-07-29 PROCEDURE — 2709999900 HC NON-CHARGEABLE SUPPLY: Performed by: NEUROLOGICAL SURGERY

## 2022-07-29 DEVICE — LEAD NEUROSTIMULATOR L60CM MR CONDITIONAL PENTA: Type: IMPLANTABLE DEVICE | Site: BACK | Status: FUNCTIONAL

## 2022-07-29 DEVICE — ANCHOR LD FOR SPNL CRD STIM CINCH: Type: IMPLANTABLE DEVICE | Site: BACK | Status: FUNCTIONAL

## 2022-07-29 DEVICE — GENERATOR NEUROSTIMULATOR 304CUCM H219XL195IN THK053IN: Type: IMPLANTABLE DEVICE | Site: BACK | Status: FUNCTIONAL

## 2022-07-29 RX ORDER — NEOSTIGMINE METHYLSULFATE 1 MG/ML
INJECTION, SOLUTION INTRAVENOUS PRN
Status: DISCONTINUED | OUTPATIENT
Start: 2022-07-29 | End: 2022-07-29 | Stop reason: SDUPTHER

## 2022-07-29 RX ORDER — VANCOMYCIN HYDROCHLORIDE 500 MG/10ML
INJECTION, POWDER, LYOPHILIZED, FOR SOLUTION INTRAVENOUS PRN
Status: DISCONTINUED | OUTPATIENT
Start: 2022-07-29 | End: 2022-07-29 | Stop reason: HOSPADM

## 2022-07-29 RX ORDER — SODIUM CHLORIDE 0.9 % (FLUSH) 0.9 %
5-40 SYRINGE (ML) INJECTION EVERY 12 HOURS SCHEDULED
Status: DISCONTINUED | OUTPATIENT
Start: 2022-07-29 | End: 2022-07-29 | Stop reason: HOSPADM

## 2022-07-29 RX ORDER — ONDANSETRON 2 MG/ML
INJECTION INTRAMUSCULAR; INTRAVENOUS PRN
Status: DISCONTINUED | OUTPATIENT
Start: 2022-07-29 | End: 2022-07-29 | Stop reason: SDUPTHER

## 2022-07-29 RX ORDER — SODIUM CHLORIDE 9 MG/ML
INJECTION, SOLUTION INTRAVENOUS PRN
Status: DISCONTINUED | OUTPATIENT
Start: 2022-07-29 | End: 2022-07-29 | Stop reason: SDUPTHER

## 2022-07-29 RX ORDER — DEXAMETHASONE SODIUM PHOSPHATE 10 MG/ML
INJECTION INTRAMUSCULAR; INTRAVENOUS PRN
Status: DISCONTINUED | OUTPATIENT
Start: 2022-07-29 | End: 2022-07-29 | Stop reason: SDUPTHER

## 2022-07-29 RX ORDER — GLYCOPYRROLATE 0.2 MG/ML
INJECTION INTRAMUSCULAR; INTRAVENOUS PRN
Status: DISCONTINUED | OUTPATIENT
Start: 2022-07-29 | End: 2022-07-29 | Stop reason: SDUPTHER

## 2022-07-29 RX ORDER — SODIUM CHLORIDE 0.9 % (FLUSH) 0.9 %
5-40 SYRINGE (ML) INJECTION PRN
Status: DISCONTINUED | OUTPATIENT
Start: 2022-07-29 | End: 2022-07-29 | Stop reason: HOSPADM

## 2022-07-29 RX ORDER — LIDOCAINE HYDROCHLORIDE 20 MG/ML
INJECTION, SOLUTION INTRAVENOUS PRN
Status: DISCONTINUED | OUTPATIENT
Start: 2022-07-29 | End: 2022-07-29 | Stop reason: SDUPTHER

## 2022-07-29 RX ORDER — ROCURONIUM BROMIDE 10 MG/ML
INJECTION, SOLUTION INTRAVENOUS PRN
Status: DISCONTINUED | OUTPATIENT
Start: 2022-07-29 | End: 2022-07-29 | Stop reason: SDUPTHER

## 2022-07-29 RX ORDER — SODIUM CHLORIDE 9 MG/ML
INJECTION, SOLUTION INTRAVENOUS CONTINUOUS
Status: DISCONTINUED | OUTPATIENT
Start: 2022-07-29 | End: 2022-07-29 | Stop reason: HOSPADM

## 2022-07-29 RX ORDER — MIDAZOLAM HYDROCHLORIDE 1 MG/ML
INJECTION INTRAMUSCULAR; INTRAVENOUS PRN
Status: DISCONTINUED | OUTPATIENT
Start: 2022-07-29 | End: 2022-07-29 | Stop reason: SDUPTHER

## 2022-07-29 RX ORDER — MEPERIDINE HYDROCHLORIDE 25 MG/ML
12.5 INJECTION INTRAMUSCULAR; INTRAVENOUS; SUBCUTANEOUS EVERY 5 MIN PRN
Status: DISCONTINUED | OUTPATIENT
Start: 2022-07-29 | End: 2022-07-29 | Stop reason: HOSPADM

## 2022-07-29 RX ORDER — LIDOCAINE HYDROCHLORIDE AND EPINEPHRINE 5; 5 MG/ML; UG/ML
INJECTION, SOLUTION INFILTRATION; PERINEURAL PRN
Status: DISCONTINUED | OUTPATIENT
Start: 2022-07-29 | End: 2022-07-29 | Stop reason: HOSPADM

## 2022-07-29 RX ORDER — SODIUM CHLORIDE 9 MG/ML
INJECTION, SOLUTION INTRAVENOUS PRN
Status: DISCONTINUED | OUTPATIENT
Start: 2022-07-29 | End: 2022-07-29 | Stop reason: HOSPADM

## 2022-07-29 RX ORDER — ONDANSETRON 2 MG/ML
4 INJECTION INTRAMUSCULAR; INTRAVENOUS
Status: DISCONTINUED | OUTPATIENT
Start: 2022-07-29 | End: 2022-07-29 | Stop reason: HOSPADM

## 2022-07-29 RX ORDER — CEPHALEXIN 500 MG/1
500 CAPSULE ORAL 4 TIMES DAILY
Qty: 28 CAPSULE | Refills: 0 | Status: SHIPPED | OUTPATIENT
Start: 2022-07-29 | End: 2022-08-05

## 2022-07-29 RX ORDER — BUPIVACAINE HYDROCHLORIDE 2.5 MG/ML
INJECTION, SOLUTION EPIDURAL; INFILTRATION; INTRACAUDAL PRN
Status: DISCONTINUED | OUTPATIENT
Start: 2022-07-29 | End: 2022-07-29 | Stop reason: HOSPADM

## 2022-07-29 RX ORDER — MORPHINE SULFATE 2 MG/ML
1 INJECTION, SOLUTION INTRAMUSCULAR; INTRAVENOUS EVERY 5 MIN PRN
Status: DISCONTINUED | OUTPATIENT
Start: 2022-07-29 | End: 2022-07-29 | Stop reason: HOSPADM

## 2022-07-29 RX ORDER — PROPOFOL 10 MG/ML
INJECTION, EMULSION INTRAVENOUS PRN
Status: DISCONTINUED | OUTPATIENT
Start: 2022-07-29 | End: 2022-07-29 | Stop reason: SDUPTHER

## 2022-07-29 RX ORDER — PHENYLEPHRINE HCL IN 0.9% NACL 1 MG/10 ML
SYRINGE (ML) INTRAVENOUS PRN
Status: DISCONTINUED | OUTPATIENT
Start: 2022-07-29 | End: 2022-07-29 | Stop reason: SDUPTHER

## 2022-07-29 RX ORDER — SODIUM CHLORIDE 0.9 % (FLUSH) 0.9 %
5-40 SYRINGE (ML) INJECTION PRN
Status: DISCONTINUED | OUTPATIENT
Start: 2022-07-29 | End: 2022-07-29 | Stop reason: SDUPTHER

## 2022-07-29 RX ORDER — SODIUM CHLORIDE 0.9 % (FLUSH) 0.9 %
5-40 SYRINGE (ML) INJECTION EVERY 12 HOURS SCHEDULED
Status: DISCONTINUED | OUTPATIENT
Start: 2022-07-29 | End: 2022-07-29 | Stop reason: SDUPTHER

## 2022-07-29 RX ORDER — MEPERIDINE HYDROCHLORIDE 25 MG/ML
12.5 INJECTION INTRAMUSCULAR; INTRAVENOUS; SUBCUTANEOUS
Status: DISCONTINUED | OUTPATIENT
Start: 2022-07-29 | End: 2022-07-29 | Stop reason: SDUPTHER

## 2022-07-29 RX ORDER — FENTANYL CITRATE 50 UG/ML
INJECTION, SOLUTION INTRAMUSCULAR; INTRAVENOUS PRN
Status: DISCONTINUED | OUTPATIENT
Start: 2022-07-29 | End: 2022-07-29 | Stop reason: SDUPTHER

## 2022-07-29 RX ORDER — MORPHINE SULFATE 2 MG/ML
2 INJECTION, SOLUTION INTRAMUSCULAR; INTRAVENOUS EVERY 5 MIN PRN
Status: DISCONTINUED | OUTPATIENT
Start: 2022-07-29 | End: 2022-07-29 | Stop reason: HOSPADM

## 2022-07-29 RX ADMIN — SODIUM CHLORIDE: 9 INJECTION, SOLUTION INTRAVENOUS at 06:31

## 2022-07-29 RX ADMIN — Medication 100 MCG: at 07:20

## 2022-07-29 RX ADMIN — DEXAMETHASONE SODIUM PHOSPHATE 10 MG: 10 INJECTION INTRAMUSCULAR; INTRAVENOUS at 07:37

## 2022-07-29 RX ADMIN — ROCURONIUM BROMIDE 35 MG: 10 INJECTION, SOLUTION INTRAVENOUS at 06:34

## 2022-07-29 RX ADMIN — Medication 200 MCG: at 07:11

## 2022-07-29 RX ADMIN — FENTANYL CITRATE 50 MCG: 50 INJECTION, SOLUTION INTRAMUSCULAR; INTRAVENOUS at 07:24

## 2022-07-29 RX ADMIN — Medication 100 MCG: at 06:58

## 2022-07-29 RX ADMIN — Medication 200 MCG: at 07:35

## 2022-07-29 RX ADMIN — PROPOFOL 200 MG: 10 INJECTION, EMULSION INTRAVENOUS at 06:34

## 2022-07-29 RX ADMIN — GLYCOPYRROLATE 0.6 MG: 0.2 INJECTION, SOLUTION INTRAMUSCULAR; INTRAVENOUS at 07:37

## 2022-07-29 RX ADMIN — CEFAZOLIN 3000 MG: 10 INJECTION, POWDER, FOR SOLUTION INTRAVENOUS at 06:44

## 2022-07-29 RX ADMIN — FENTANYL CITRATE 50 MCG: 50 INJECTION, SOLUTION INTRAMUSCULAR; INTRAVENOUS at 07:58

## 2022-07-29 RX ADMIN — Medication 200 MCG: at 06:49

## 2022-07-29 RX ADMIN — DEXAMETHASONE SODIUM PHOSPHATE 10 MG: 10 INJECTION INTRAMUSCULAR; INTRAVENOUS at 06:50

## 2022-07-29 RX ADMIN — Medication 3 MG: at 07:37

## 2022-07-29 RX ADMIN — LIDOCAINE HYDROCHLORIDE 100 MG: 20 INJECTION, SOLUTION INTRAVENOUS at 06:34

## 2022-07-29 RX ADMIN — MORPHINE SULFATE 2 MG: 2 INJECTION, SOLUTION INTRAMUSCULAR; INTRAVENOUS at 08:28

## 2022-07-29 RX ADMIN — Medication 100 MCG: at 06:51

## 2022-07-29 RX ADMIN — FENTANYL CITRATE 100 MCG: 50 INJECTION, SOLUTION INTRAMUSCULAR; INTRAVENOUS at 06:34

## 2022-07-29 RX ADMIN — MIDAZOLAM 2 MG: 1 INJECTION INTRAMUSCULAR; INTRAVENOUS at 06:29

## 2022-07-29 RX ADMIN — FENTANYL CITRATE 50 MCG: 50 INJECTION, SOLUTION INTRAMUSCULAR; INTRAVENOUS at 06:50

## 2022-07-29 RX ADMIN — SODIUM CHLORIDE: 9 INJECTION, SOLUTION INTRAVENOUS at 07:25

## 2022-07-29 RX ADMIN — ONDANSETRON 4 MG: 2 INJECTION INTRAMUSCULAR; INTRAVENOUS at 07:37

## 2022-07-29 RX ADMIN — Medication 100 MCG: at 07:28

## 2022-07-29 ASSESSMENT — PAIN SCALES - GENERAL
PAINLEVEL_OUTOF10: 0
PAINLEVEL_OUTOF10: 7
PAINLEVEL_OUTOF10: 0
PAINLEVEL_OUTOF10: 7
PAINLEVEL_OUTOF10: 0

## 2022-07-29 ASSESSMENT — PAIN DESCRIPTION - FREQUENCY
FREQUENCY: CONTINUOUS
FREQUENCY: CONTINUOUS

## 2022-07-29 ASSESSMENT — PAIN DESCRIPTION - PAIN TYPE
TYPE: SURGICAL PAIN
TYPE: CHRONIC PAIN

## 2022-07-29 ASSESSMENT — PAIN DESCRIPTION - DESCRIPTORS
DESCRIPTORS: ACHING;DISCOMFORT
DESCRIPTORS: SHARP;DISCOMFORT

## 2022-07-29 ASSESSMENT — PAIN DESCRIPTION - ORIENTATION: ORIENTATION: MID;LOWER

## 2022-07-29 ASSESSMENT — PAIN - FUNCTIONAL ASSESSMENT: PAIN_FUNCTIONAL_ASSESSMENT: PREVENTS OR INTERFERES SOME ACTIVE ACTIVITIES AND ADLS

## 2022-07-29 ASSESSMENT — PAIN DESCRIPTION - ONSET: ONSET: ON-GOING

## 2022-07-29 ASSESSMENT — PAIN DESCRIPTION - LOCATION
LOCATION: BACK
LOCATION: BACK

## 2022-07-29 NOTE — H&P
I have examined the patient and reviewed the H and P and no changes are noted.   He wants the battery on the left    Gopal Vincent MD

## 2022-07-29 NOTE — PROGRESS NOTES
CLINICAL PHARMACY NOTE: MEDS TO BEDS    Total # of Prescriptions Filled: 1   The following medications were delivered to the patient:  Cephalexin 500 mg  Delivered to pt @ 10:18a    Additional Documentation:

## 2022-07-29 NOTE — PROGRESS NOTES
[facility] spinal cord stimulator at bedside. Personnel here and testing it on patient and wife here to teach.

## 2022-07-29 NOTE — OP NOTE
[address]                  [address] [facility],  [facility]                                OPERATIVE REPORT    PATIENT NAME: Laurie Sheehan                    :        1950  MED REC NO:   [id number]                            ROOM:  ACCOUNT NO:   [de-identified]                           ADMIT DATE: 2022  PROVIDER:     Mario Brown MD    DATE OF PROCEDURE:  2022    PREOPERATIVE DIAGNOSIS:  Failed back surgery syndrome. POSTOPERATIVE DIAGNOSIS:  Failed back surgery syndrome. OPERATIVE PROCEDURE:  1.  Bilateral T10 laminectomy and placement of T8 [facility] [facility] Hankins spinal  cord stimulator paddle electrode. 2.  Placement of left implantable pulse generator. 3.  Use of intraoperative fluoroscopy interpreted by myself, the  surgeon. 4.  Complex programming of spinal cord stimulator. ANESTHESIA:  Generalized endotracheal anesthesia. SURGEON:  Mario Brown MD    ASSISTANT:  Jaron Darling DO    COMPLICATIONS:  None. ESTIMATED BLOOD LOSS:  50 mL. SPECIMEN:  None. OPERATIVE INDICATIONS:  The patient is a 67-year-old gentleman who  previously had a lumbar fusion several years ago. He had good relief of  his leg pain, but continued to have persistent back pain. He had a  spinal cord stimulator trial with greater than 60% improvement in  symptoms, and after risks, benefits and alternatives were discussed with  the patient, it was determined that he would undergo the above-listed  procedure. DESCRIPTION OF THE OPERATIVE PROCEDURE:  The patient was brought into  the operating room. A time-out was performed where he was identified by  his name, medical record number and the operative procedure which he was  about to undergo. Next, induction of general endotracheal anesthesia  was then commenced. Upon completion of induction of generalized  endotracheal anesthesia, he received preoperative antibiotics.   He was  then flipped into prone position on a Desmond table. All pressure  points were padded. His thoracic, lumbar and gluteal regions were  prepped and draped in a usual sterile fashion. After this was done, I  then used intraoperative fluoroscopy to herbie an incision over T10. I  used #10 blade to make a skin incision. Monopolar cautery was used to  dissect through the subcutaneous tissue. I placed self-retaining  Weitlaner retractor into the wound. Next, I opened up the thoracic  fascia sharply with monopolar cautery and exposed the spinous process at  T10. After this was done, I proceeded to then perform a subperiosteal  dissection to expose the bilateral laminae at T10. A self-retaining  angled cerebellar retractor was placed into the wound. I used a Leksell  rongeur to bite up the spinous process of T10. I used a high-speed mónica  to thin out the lamina at T10. I used #4 Kerrison punch and performed  bilateral T10 laminectomies. After this was done, I then proceeded to  then dissect in the epidural space. I was able to then pass and advance  the paddle electrode to T8. Next, I then proceeded to then feed  anchoring boots through the leads of the paddle electrode. I was able  to anchor the paddle to the fascia using two 2-0 silk sutures. After  this was done, I used a #10-blade to open up the skin incision in the  left gluteal region. Monopolar cautery was used to dissect through  tissue. I then created a pocket. After the pocket was created, I then  tunneled from the thoracic incision to the gluteal incision. I was able  to then feed the leads through the tunneler sheath, and I was then able  to insert the leads into the implantable pulse generator. Impedances  were checked. They were all within normal limits. I then inserted the  implantable pulse generator pocket. I anchored it down using two 2-0  silk suture. I then irrigated both incisions copiously with antibiotic  impregnated saline.   I closed the thoracic incision in layers using 0  Vicryl in the fascia, 2-0 Vicryl for the subcutaneous layer and 4-0  Monocryl in a subcuticular fashion for the skin with Dermabond. I then  closed the implantable pulse generator pocket in layers using 2-0 Vicryl  for the subcutaneous layer and 4-0 Monocryl in a subcuticular fashion  for the skin. Dermabond was also placed over this. Dry sterile  dressing was placed over both incisions. The patient was then flipped  in supine position on his hospital bed, was extubated and transported to  the postanesthesia care unit. Prior to transfer to the postanesthesia  care unit, complex programming of the stimulator was performed. There  were no complications. Counts were correct. I was present for the  entire case.         Colin Taylor MD    D: 07/29/2022 7:59:43       T: 07/29/2022 8:02:25     [email or web address]  Job#: [id number]     Doc#: [id number]    CC:

## 2022-07-29 NOTE — BRIEF OP NOTE
Brief Postoperative Note      Patient: Mirta Marie  YOB: 1950  MRN: [id number]    Date of Procedure: 7/29/2022    Pre-Op Diagnosis: FAILED BACK SURGERY SYNDROME    Post-Op Diagnosis: Same       Procedure(s):  T8 [facility] SPINAL CORD STIMULATOR INSERTION    Surgeon(s):  Brittni Sweet MD    Assistant:  Physician Assistant: Lenora Miller PA-C  Resident: Reynaldo Martínez DO    Anesthesia: General    Estimated Blood Loss (mL): less than 50     Complications: None    Specimens:   * No specimens in log *    Implants:  Implant Name Type Inv.  Item Serial No.  Lot No. LRB No. Used Action   LEAD NEUROSTIMULATOR L60CM MR CONDITIONAL PENTA - [id number]  LEAD NEUROSTIMULATOR L60CM MR CONDITIONAL PENTA [id number] [facility] [employer] NEUOM[employer]ATION DIV-WD   1 Implanted   ANCHOR LD FOR SPNL CRD STIM CINCH - [id number]  ANCHOR LD FOR SPNL CRD STIM [facility]  [id number]-[id number]  2 Implanted   GENERATOR NEUROSTIMULATOR 304CUCM [id number] - [id number]  GENERATOR NEUROSTIMULATOR 304CUCM [id number] ABBOTT-WD   1 Implanted         Drains: * No LDAs found *    Findings: see dictated op note    Electronically signed by Rancho Nazario MD on 7/29/2022 at 7:55 AM

## 2022-07-29 NOTE — PROGRESS NOTES
Patient received from OR at this time. Two dressings on back. Lower dressing has scant amount blood noted. Awake and following commands.

## 2022-07-29 NOTE — ANESTHESIA PRE PROCEDURE
Department of Anesthesiology  Preprocedure Note       Name:  Jack Stlil   Age:  71 y.o.  :  1950                                          MRN:  [id number]         Date:  2022      Surgeon: Smiley Sutton):  Zachariah Leonard MD    Procedure: T8 [facility] MAHAN SPINAL CORD STIMULATOR INSERTION (Bilateral)    Medications prior to admission:   Prior to Admission medications    Medication Sig Start Date End Date Taking? Authorizing Provider   morphine (MS CONTIN) 15 MG extended release tablet TAKE ONE TABLET BY MOUTH EVERY 8 HOURS start 22 end 22   Historical Provider, MD   levETIRAcetam (KEPPRA) 500 MG tablet Take 1 tablet by mouth 2 times daily 22   Rodrigo Watkins MD   lisinopril (PRINIVIL;ZESTRIL) 20 MG tablet Take 1 tablet by mouth daily  Patient taking differently: Take 20 mg by mouth at bedtime 22   Rodrigo Watkins MD   metoprolol succinate (TOPROL XL) 25 MG extended release tablet Take 1 tablet by mouth daily  Patient taking differently: Take 25 mg by mouth at bedtime 22   Rodrigo Watkins MD   pravastatin (PRAVACHOL) 40 MG tablet Take 1 tablet by mouth daily  Patient taking differently: Take 40 mg by mouth at bedtime 22   Rodrigo Watkins MD   oxyCODONE-acetaminophen (PERCOCET)  MG per tablet TAKE ONE TABLET BY MOUTH THREE TIMES DAILY AS NEEDED FOR PAIN START  END 21   Historical Provider, MD   vitamin C (ASCORBIC ACID) 500 MG tablet Take 500 mg by mouth every other day    Historical Provider, MD   aspirin 81 MG chewable tablet Take 1 tablet by mouth daily 19   Julio Cesar Florian MD       Current medications:    No current facility-administered medications for this visit. No current outpatient medications on file.      Facility-Administered Medications Ordered in Other Visits   Medication Dose Route Frequency Provider Last Rate Last Admin    sodium chloride flush 0.9 % injection 5-40 mL  5-40 mL IntraVENous 2 times per day Reed Goodwin DO  sodium chloride flush 0.9 % injection 5-40 mL  5-40 mL IntraVENous PRN Stephanie Beat Finamore, DO        0.9 % sodium chloride infusion   IntraVENous PRN Nancy Vic, DO        meperidine (DEMEROL) injection 12.5 mg  12.5 mg IntraVENous Q15 Min PRN Stephanie Beat Finamore, DO        HYDROmorphone (DILAUDID) injection 0.25 mg  0.25 mg IntraVENous Q5 Min PRN Stephanie Beat Finamore, DO        HYDROmorphone (DILAUDID) injection 0.5 mg  0.5 mg IntraVENous Q5 Min PRN Stephanie Beat Finamore, DO        ondansetron [facility]) injection 4 mg  4 mg IntraVENous Once PRN Stephanie Beat Finamore, DO        0.9 % sodium chloride infusion   IntraVENous Continuous Luna Colon, PA-C        sodium chloride flush 0.9 % injection 5-40 mL  5-40 mL IntraVENous 2 times per day Luna Colon, PA-C        sodium chloride flush 0.9 % injection 5-40 mL  5-40 mL IntraVENous PRN Luna Colon, PA-C        0.9 % sodium chloride infusion   IntraVENous PRN Luna Colon, PA-C        ceFAZolin (ANCEF) 3,000 mg in dextrose 5 % 100 mL IVPB  3,000 mg IntraVENous See Admin Instructions Luna Colon, PA-C           Allergies:     Allergies   Allergen Reactions    Statins Other (See Comments)     Myalgias        Problem List:    Patient Active Problem List   Diagnosis Code    Hypertension I10    Primary osteoarthritis involving multiple joints M89.49    Hyperlipidemia E78.5    Obesity E66.9    Bilateral carpal tunnel syndrome G56.03    Erectile dysfunction N52.9    Sleep apnea G47.30    Hypogonadism in male E29.1    Osteoarthritis M19.90    Pleural effusion J90    Postoperative atrial fibrillation (HCC) I97.89, I48.91    Iron deficiency anemia due to chronic blood loss D50.0    Chronic fatigue R53.82    Radiculopathy M54.10    Elevated PSA R97.20    Hx of lumbosacral spine surgery Z98.890    Subarachnoid hemorrhage (HCC) I60.9    Seizure as late effect of cerebrovascular accident (CVA) ([facility] I69.398, R56.9    Acute diverticulitis K57.92    CPAP (continuous positive airway pressure) dependence Z99.89    Chronic back pain M54.9, G89.29    Cardiomegaly I51.7    CAD (coronary artery disease) I25.10    Abnormal finding of blood chemistry, unspecified  R79.9    Glucose intolerance E74.39    Hx of seizure disorder Z86.69    Postlaminectomy syndrome M96.1    Pre-op exam Z01.818       Past Medical History:        Diagnosis Date    Basal cell carcinoma     CAD (coronary artery disease) 11/2019    Cardiomegaly 05/17/2016    Chronic back pain     Chronic back pain     CPAP (continuous positive airway pressure) dependence     Erectile dysfunction     Erectile dysfunction     Esophagitis 09/2011    VITO    H/O cardiomegaly     NORMAL ECHO IN 2016    H/O hypogonadism 2012    History of basal cell carcinoma     History of pneumonia 2016    History of sebaceous cyst     Hyperlipidemia     Hypertension     Hypogonadism in male 12/18/2012    Lumbar radiculopathy 08/17/2015    Obesity     Obesity     Osteoarthritis     Postoperative atrial fibrillation ([address] 12/24/2019    Seizure ([address] 05/29/2020    Sleep apnea     Stroke ([address] 11/2019    No residual       Past Surgical History:        Procedure Laterality Date    CARDIAC CATHETERIZATION  12/10/2019    scavina  CT surgery consult    CORONARY ARTERY BYPASS GRAFT N/A 12/23/2019    CABG CORONARY ARTERY BYPASS, SHANTELL performed by Sergio Gold MD at [address]  [address]    [address] [place] [address] ESOPHGOSCOPY  9/10/2011    foreign body removal    JOINT REPLACEMENT Bilateral 2004    knees    KNEE SURGERY  2004    bilateral knee replacements    SHOULDER SURGERY  1997    right rotator cuff    SHOULDER SURGERY Right 1997    SKIN CANCER EXCISION  2003    [address]    VASECTOMY      AGE 27       Social History:    Social History     Tobacco Use    Smoking status: Former     Packs/day: 1.50     Years: 31.00     Pack years: 46.50     Types: Cigarettes     Start date: 6/23/1966     Quit date: 0     Years since quittin.5    Smokeless tobacco: Never   Substance Use Topics    Alcohol use: Not Currently     Alcohol/week: 8.0 standard drinks     Types: 8 Drinks containing 0.5 oz of alcohol per week     Comment: drinks daily during summer months                                Counseling given: Not Answered      Vital Signs (Current): There were no vitals filed for this visit. BP Readings from Last 3 Encounters:   22 (!) 156/90   22 (!) 144/86   22 (!) 156/89       NPO Status:  22. SIPS WITH MEDS TODAY. BMI:   Wt Readings from Last 3 Encounters:   22 272 lb (123.4 kg)   22 271 lb (122.9 kg)   22 270 lb (122.5 kg)     There is no height or weight on file to calculate BMI.    CBC:   Lab Results   Component Value Date/Time    WBC 7.7 2022 08:45 AM    RBC 4.74 2022 08:45 AM    HGB 15.8 2022 08:45 AM    HCT 47.9 2022 08:45 AM    .1 2022 08:45 AM    RDW 12.4 2022 08:45 AM     2022 08:45 AM       CMP:   Lab Results   Component Value Date/Time     2022 08:45 AM    K 4.4 2022 08:45 AM     2022 08:45 AM    CO2 25 2022 08:45 AM    BUN 14 2022 08:45 AM    CREATININE 0.8 2022 08:45 AM    GFRAA >60 2022 08:45 AM    AGRATIO 1.1 2020 12:42 PM    LABGLOM >60 2022 08:45 AM    GLUCOSE 106 2022 08:45 AM    GLUCOSE 100 2012 05:00 AM    PROT 7.7 2022 11:49 AM    CALCIUM 9.3 2022 08:45 AM    BILITOT 0.3 2022 11:49 AM    ALKPHOS 82 2022 11:49 AM    AST 41 2022 11:49 AM    ALT 41 2022 11:49 AM       POC Tests: No results for input(s): POCGLU, POCNA, POCK, POCCL, POCBUN, POCHEMO, POCHCT in the last 72 hours.     Coags:   Lab Results   Component Value Date/Time    PROTIME 11.5 2022 08:45 AM    INR 1.0 07/22/2022 08:45 AM    APTT 30.4 12/23/2019 12:45 PM       HCG (If Applicable): No results found for: PREGTESTUR, PREGSERUM, HCG, HCGQUANT     ABGs:   Lab Results   Component Value Date/Time    PO2ART 65.3 12/23/2019 12:15 PM    STB1BJL 46.2 12/23/2019 12:15 PM    HVL6DHA 25.0 12/23/2019 12:15 PM        Type & Screen (If Applicable):  No results found for: LABABO, [address]     12/19/19 US CAROTIDS    Impression   Atherosclerotic disease. No hemodynamically significant stenosis is   identified   Estimated stenosis by NASCET criteria in the proximal right carotid   artery is between 0% and 49%. Estimated stenosis by NASCET criteria in the proximal left carotid   artery is between 0% and 49%.         12/24/21 EKG  NSR    12/10/19 ECHO     Findings      Left Ventricle   Left ventricle is mildly enlarged . Mild concentric left ventricular hypertrophy. Ejection fraction is visually estimated at 50%. \   Apical LV hypokinesia   Normal diastolic function. Right Ventricle   Normal right ventricular size and function. Left Atrium   The left atrium is mildly dilated. Interatrial septum appears intact. Right Atrium   Normal right atrium size. Mitral Valve   Normal mitral valve structure and function. No evidence of mitral valve stenosis. No systolic mitral regurgitation noted. Tricuspid Valve   The tricuspid valve appears structurally normal.   Physiologic and/or trace tricuspid regurgitation. RVSP is normal.      Aortic Valve   Structurally normal aortic valve. No evidence of aortic valve regurgitation. No hemodynamically significant aortic stenosis is present. Pulmonic Valve   The pulmonic valve was not well visualized. Pericardial Effusion   No evidence of pericardial effusion. Aorta   Aortic root dimension within normal limits. Conclusions      Summary   Ejection fraction is visually estimated at 50%.    Apical LV hypokinesia   Normal diastolic function. Signature      ----------------------------------------------------------------   Electronically signed by Marcy Rai MD(Interpreting   physician) on 12/10/2019 12:59 PM    Anesthesia Evaluation  Patient summary reviewed and Nursing notes reviewed no history of anesthetic complications:   Airway: Mallampati: III  TM distance: <3 FB   Neck ROM: limited  Comment: Limited ROM of neck   Mouth opening: < 3 FB   Dental:    (+) upper dentures and lower dentures  Comment: edentulous    Pulmonary:   (+) sleep apnea: on CPAP,  decreased breath sounds: bilateral                           ROS comment: Former smoker 2 ppd, quit in 1997    Cardiovascular:  Exercise tolerance: poor (<4 METS),   (+) hypertension:, CAD: obstructive, CABG/stent (CABG 2019):, dysrhythmias: atrial fibrillation, hyperlipidemia      ECG reviewed  Rhythm: regular  Rate: normal  Echocardiogram reviewed         Beta Blocker:  Dose within 24 Hrs         Neuro/Psych:   (+) seizures (2021):, CVA (Stroke November 2019):, neuromuscular disease (bilateral carpal tunnel ):,              ROS comment: S/p L4-L5 surgery  Patient reports chronic lower back pain with numbness and tingling in BLE    Denies any residual deficit after CVA   GI/Hepatic/Renal:   (+) GERD: well controlled, renal disease: CRI,           Endo/Other:    (+) blood dyscrasia (aspirin): anticoagulation therapy and anemia, arthritis: OA., malignancy/cancer (hx: basal cell CA). Pt had PAT visit. Abdominal:   (+) obese,           Vascular: negative vascular ROS. Other Findings:             Anesthesia Plan      general     ASA 4       Induction: intravenous. Anesthetic plan and risks discussed with patient. Use of blood products discussed with patient whom consented to blood products. Plan discussed with attending. Sunita Bermeo RN   7/29/2022      Pt seen, examined, chart reviewed, plan discussed.   [address] [place] [address] MD  7/29/2022  8:04 AM

## 2022-08-18 PROBLEM — Z01.818 PRE-OP EXAM: Status: RESOLVED | Noted: 2022-07-19 | Resolved: 2022-08-18

## 2022-08-26 ENCOUNTER — OFFICE VISIT (OUTPATIENT)
Dept: NEUROSURGERY | Age: 72
End: 2022-08-26
Payer: MEDICARE

## 2022-08-26 VITALS
OXYGEN SATURATION: 98 % | BODY MASS INDEX: 36.84 KG/M2 | WEIGHT: 272 LBS | HEIGHT: 72 IN | DIASTOLIC BLOOD PRESSURE: 86 MMHG | HEART RATE: 65 BPM | SYSTOLIC BLOOD PRESSURE: 153 MMHG | TEMPERATURE: 97.9 F | RESPIRATION RATE: 18 BRPM

## 2022-08-26 DIAGNOSIS — Z96.89 S/P INSERTION OF SPINAL CORD STIMULATOR: Primary | ICD-10-CM

## 2022-08-26 PROCEDURE — 99212 OFFICE O/P EST SF 10 MIN: CPT

## 2022-08-26 PROCEDURE — 99024 POSTOP FOLLOW-UP VISIT: CPT | Performed by: STUDENT IN AN ORGANIZED HEALTH CARE EDUCATION/TRAINING PROGRAM

## 2022-08-26 NOTE — PROGRESS NOTES
Post-Operative Follow-up     This is a 71year old male who presents to the office for a 1 month follow-up s/p spinal cord stimulator insertion. Subjective: Patient states he is feeling great. Denies any significant back pain. No new numbness or weakness. [employer] rep present during visit and helped program device. Denies loss of bowel or bladder, saddle anesthesia, pain down the legs, numbness, tingling, headache, loss of dexterity, abnormal gait, fever, chills, N/V, SOB, or chest pain. Physical Exam:              WDWN, no apparent distress              Non-labored breathing               Vitals Stable              Alert and oriented x3              CN 3-12 intact              PERRL              EOMI              PEOPLES well              Motor strength symmetric              Sensation to LT intact bilaterally   Incision healing well without signs of infection. Assessment: This is a 71 y.o.  male presenting for a 1 month follow-up s/p spinal cord stimulator insertion. Stable. Plan:  -Pain control and expectations discussed  -No restrictions. -OARRS report reviewed   -Follow-up in neurosurgery clinic prn  -Call or return to neurosurgery office sooner if symptoms worsen or if new issues arise in the interim.     Electronically signed by Rasheeda Elliott PA-C on 8/26/2022 at 5:58 PM

## 2023-01-18 DIAGNOSIS — M15.9 PRIMARY OSTEOARTHRITIS INVOLVING MULTIPLE JOINTS: ICD-10-CM

## 2023-01-18 DIAGNOSIS — I10 ESSENTIAL HYPERTENSION: ICD-10-CM

## 2023-01-18 DIAGNOSIS — I25.10 CORONARY ARTERY DISEASE INVOLVING NATIVE CORONARY ARTERY OF NATIVE HEART WITHOUT ANGINA PECTORIS: ICD-10-CM

## 2023-01-18 DIAGNOSIS — E78.2 MIXED HYPERLIPIDEMIA: ICD-10-CM

## 2023-01-18 RX ORDER — METOPROLOL SUCCINATE 25 MG/1
25 TABLET, EXTENDED RELEASE ORAL DAILY
Qty: 90 TABLET | Refills: 0 | Status: SHIPPED | OUTPATIENT
Start: 2023-01-18

## 2023-01-18 NOTE — TELEPHONE ENCOUNTER
Last Appointment:  7/19/2022  Future Appointments   Date Time Provider John Barger   3/7/2023  4:00 PM Anahi Le  [address] [place] [address]

## 2023-03-09 ENCOUNTER — OFFICE VISIT (OUTPATIENT)
Dept: PRIMARY CARE CLINIC | Age: 73
End: 2023-03-09

## 2023-03-09 VITALS
TEMPERATURE: 98 F | HEART RATE: 67 BPM | DIASTOLIC BLOOD PRESSURE: 90 MMHG | WEIGHT: 274 LBS | BODY MASS INDEX: 37.16 KG/M2 | SYSTOLIC BLOOD PRESSURE: 152 MMHG | OXYGEN SATURATION: 96 %

## 2023-03-09 DIAGNOSIS — I10 PRIMARY HYPERTENSION: ICD-10-CM

## 2023-03-09 DIAGNOSIS — R56.9 SEIZURE AS LATE EFFECT OF CEREBROVASCULAR ACCIDENT (CVA) (HCC): ICD-10-CM

## 2023-03-09 DIAGNOSIS — I69.398 SEIZURE AS LATE EFFECT OF CEREBROVASCULAR ACCIDENT (CVA) (HCC): ICD-10-CM

## 2023-03-09 DIAGNOSIS — E29.1 HYPOGONADISM IN MALE: ICD-10-CM

## 2023-03-09 DIAGNOSIS — I10 ESSENTIAL HYPERTENSION: ICD-10-CM

## 2023-03-09 DIAGNOSIS — I25.10 CORONARY ARTERY DISEASE INVOLVING NATIVE CORONARY ARTERY OF NATIVE HEART WITHOUT ANGINA PECTORIS: Primary | ICD-10-CM

## 2023-03-09 DIAGNOSIS — E78.2 MIXED HYPERLIPIDEMIA: ICD-10-CM

## 2023-03-09 DIAGNOSIS — I48.91 POSTOPERATIVE ATRIAL FIBRILLATION (HCC): ICD-10-CM

## 2023-03-09 DIAGNOSIS — M15.9 PRIMARY OSTEOARTHRITIS INVOLVING MULTIPLE JOINTS: ICD-10-CM

## 2023-03-09 DIAGNOSIS — I97.89 POSTOPERATIVE ATRIAL FIBRILLATION (HCC): ICD-10-CM

## 2023-03-09 DIAGNOSIS — I25.10 CORONARY ARTERY DISEASE INVOLVING NATIVE CORONARY ARTERY OF NATIVE HEART WITHOUT ANGINA PECTORIS: ICD-10-CM

## 2023-03-09 LAB
ALBUMIN SERPL-MCNC: 4.1 G/DL (ref 3.5–5.2)
ALP BLD-CCNC: 79 U/L (ref 40–129)
ALT SERPL-CCNC: 27 U/L (ref 0–40)
ANION GAP SERPL CALCULATED.3IONS-SCNC: 9 MMOL/L (ref 7–16)
AST SERPL-CCNC: 28 U/L (ref 0–39)
BASOPHILS ABSOLUTE: 0.08 E9/L (ref 0–0.2)
BASOPHILS RELATIVE PERCENT: 1 % (ref 0–2)
BILIRUB SERPL-MCNC: 0.4 MG/DL (ref 0–1.2)
BUN BLDV-MCNC: 13 MG/DL (ref 6–23)
CALCIUM SERPL-MCNC: 9.1 MG/DL (ref 8.6–10.2)
CHLORIDE BLD-SCNC: 100 MMOL/L (ref 98–107)
CHOLESTEROL, TOTAL: 165 MG/DL (ref 0–199)
CO2: 27 MMOL/L (ref 22–29)
CREAT SERPL-MCNC: 0.8 MG/DL (ref 0.7–1.2)
EOSINOPHILS ABSOLUTE: 0.66 E9/L (ref 0.05–0.5)
EOSINOPHILS RELATIVE PERCENT: 7.9 % (ref 0–6)
GFR SERPL CREATININE-BSD FRML MDRD: >60 ML/MIN/1.73
GLUCOSE BLD-MCNC: 138 MG/DL (ref 74–99)
HCT VFR BLD CALC: 47.5 % (ref 37–54)
HDLC SERPL-MCNC: 60 MG/DL
HEMOGLOBIN: 15.7 G/DL (ref 12.5–16.5)
IMMATURE GRANULOCYTES #: 0.05 E9/L
IMMATURE GRANULOCYTES %: 0.6 % (ref 0–5)
LDL CHOLESTEROL CALCULATED: 66 MG/DL (ref 0–99)
LYMPHOCYTES ABSOLUTE: 2.2 E9/L (ref 1.5–4)
LYMPHOCYTES RELATIVE PERCENT: 26.3 % (ref 20–42)
MCH RBC QN AUTO: 33.3 PG (ref 26–35)
MCHC RBC AUTO-ENTMCNC: 33.1 % (ref 32–34.5)
MCV RBC AUTO: 100.6 FL (ref 80–99.9)
MONOCYTES ABSOLUTE: 0.95 E9/L (ref 0.1–0.95)
MONOCYTES RELATIVE PERCENT: 11.4 % (ref 2–12)
NEUTROPHILS ABSOLUTE: 4.43 E9/L (ref 1.8–7.3)
NEUTROPHILS RELATIVE PERCENT: 52.8 % (ref 43–80)
PDW BLD-RTO: 12.3 FL (ref 11.5–15)
PLATELET # BLD: 263 E9/L (ref 130–450)
PMV BLD AUTO: 11.9 FL (ref 7–12)
POTASSIUM SERPL-SCNC: 3.9 MMOL/L (ref 3.5–5)
RBC # BLD: 4.72 E12/L (ref 3.8–5.8)
SODIUM BLD-SCNC: 136 MMOL/L (ref 132–146)
TOTAL PROTEIN: 7.4 G/DL (ref 6.4–8.3)
TRIGL SERPL-MCNC: 197 MG/DL (ref 0–149)
TSH SERPL DL<=0.05 MIU/L-ACNC: 2.07 UIU/ML (ref 0.27–4.2)
VLDLC SERPL CALC-MCNC: 39 MG/DL
WBC # BLD: 8.4 E9/L (ref 4.5–11.5)

## 2023-03-09 RX ORDER — PRAVASTATIN SODIUM 40 MG
40 TABLET ORAL DAILY
Qty: 90 TABLET | Refills: 1 | Status: SHIPPED | OUTPATIENT
Start: 2023-03-09

## 2023-03-09 RX ORDER — LEVETIRACETAM 500 MG/1
500 TABLET ORAL 2 TIMES DAILY
Qty: 180 TABLET | Refills: 1 | Status: SHIPPED | OUTPATIENT
Start: 2023-03-09

## 2023-03-09 RX ORDER — LISINOPRIL 20 MG/1
20 TABLET ORAL DAILY
Qty: 90 TABLET | Refills: 1 | Status: SHIPPED | OUTPATIENT
Start: 2023-03-09

## 2023-03-09 RX ORDER — METOPROLOL SUCCINATE 25 MG/1
25 TABLET, EXTENDED RELEASE ORAL DAILY
Qty: 90 TABLET | Refills: 1 | Status: SHIPPED | OUTPATIENT
Start: 2023-03-09

## 2023-03-09 SDOH — ECONOMIC STABILITY: FOOD INSECURITY: WITHIN THE PAST 12 MONTHS, THE FOOD YOU BOUGHT JUST DIDN'T LAST AND YOU DIDN'T HAVE MONEY TO GET MORE.: NEVER TRUE

## 2023-03-09 SDOH — ECONOMIC STABILITY: INCOME INSECURITY: HOW HARD IS IT FOR YOU TO PAY FOR THE VERY BASICS LIKE FOOD, HOUSING, MEDICAL CARE, AND HEATING?: NOT HARD AT ALL

## 2023-03-09 SDOH — ECONOMIC STABILITY: HOUSING INSECURITY
IN THE LAST 12 MONTHS, WAS THERE A TIME WHEN YOU DID NOT HAVE A STEADY PLACE TO SLEEP OR SLEPT IN A SHELTER (INCLUDING NOW)?: NO

## 2023-03-09 SDOH — ECONOMIC STABILITY: FOOD INSECURITY: WITHIN THE PAST 12 MONTHS, YOU WORRIED THAT YOUR FOOD WOULD RUN OUT BEFORE YOU GOT MONEY TO BUY MORE.: NEVER TRUE

## 2023-03-09 ASSESSMENT — PATIENT HEALTH QUESTIONNAIRE - PHQ9
1. LITTLE INTEREST OR PLEASURE IN DOING THINGS: 0
SUM OF ALL RESPONSES TO PHQ QUESTIONS 1-9: 0
SUM OF ALL RESPONSES TO PHQ9 QUESTIONS 1 & 2: 0
2. FEELING DOWN, DEPRESSED OR HOPELESS: 0
SUM OF ALL RESPONSES TO PHQ QUESTIONS 1-9: 0

## 2023-03-09 NOTE — PROGRESS NOTES
19  Teo Mendoza : 1950 Sex: male  Age: 68 y.o. No chief complaint on file. Patient did have his neurostimulator placed back in . He is done fairly well with this along with pain management by Dr. Rafia Gonzalez. He is currently on long-acting MS Contin plus as needed oxycodone. He is taking the oxycodone about 3-4 times a day and I told him that perhaps if the MS Contin could be raised he would have to have less breakthrough pain medication. Patient has gone back to work part-time and this seems to be a pretty good place for him currently. Patient is had no cardiac or respiratory symptoms including any anginal equivalents. He has had no seizure activity. He is denying any headache. Denies vision problems. He denies shortness of breath even with exertional activities such as walking. Blood pressure is slightly elevated today but he states that he was rushing and his blood pressures are better at home. Hypertension    Hyperlipidemia    Other      Review of Systems  Health Maintenance:  Colonoscopy - (2020) Anayeli diverticular dx  Colonoscopy Screening - (2017)  Couseled on Home Safety - (5/10/2017)  Physical Exam - (3/9/2023)  Prostate Exam - (2021)  Psa Test - (2022)  Rectal Exam - (2021)  EKG  Colonoscopy - (2017) Monique Lawler  EGD - () VITO  Sleep Study - (2014)   Influenza Vaccination - ()  Prevnar Vaccine - (2018) SLIP GIVEN  Shingrix Vaccine (Shingles) - (2018) [address] VACCINE   1. Obesity. 2. Hyperlipidemia. BEGAN SIMVISTATIN 2/1/10  3. HYPERTENSION BEGAN LISINOPRIL/HCT 2/1/10  4. Erectile dysfunction. 5. History of basal cell carcinoma. 6. History of previous sebaceous cyst.  7. OA HAD BILATERAL KNEE REPLACEMENTS  8. CHRONIC BACK PAIN- TASH EXERCISES RECOMMENDED 3/11  9. ESOPHAGITIS/PUD-  VITO  10. FEVER/DIARRHEA/RASH - PROBABLY VIRAL 12 ADMITTED TO [facility]- SEE REPORTS  11.  HYPOGONADISM- MRI ORDERED 12  12. SNORING-FATIGUE- SLEEP APNEA- SLEEP STUDY ORDERED 10/13-CPAP BEGUN  13. LUMBAR RADICULOPATHY-8/17/15-PHILLIP/HINES-SURGERY SCHEDULED 17  14. PNEUMONIA- 5/10/16-CXR CLEARED  15. CARDIOMEGALY-16-NORMAL ECHO 16 and  16. LOW BACK PAIN- REFERRED TO [facility] 18  17. OBESITY- GLP1 DISCUSSED 10/31/18  18. CHF-- referred to [facility]. Bilateral Carpal Tunnel- wrist splint   20. Abnormal stres- cath planned 12/10/2019  21. CVA-   22. Post laminectomy syndrome  [facility] Jhon  Surgical Hx:  Knee Replacement - () BILATERAL-  1. He had right shoulder surgery in .  2. He had a vasectomy at age 33.  1. He had bilateral knee replacements in . 4. Sebaceous cyst removal by Dr. Richard Ayala in . 5. Basal cell carcinoma by Dr. Esther Lundberg in . 6. LUMBAR FUSION 3/2021 Giulia  7. Neuro stimulator- [place] 2022  Reviewed and updated. FH:  Father:  Cerebrovascular Accident (CVA) - age 80. Mother:  Leukemia -  in her 29's. Reviewed, no changes. SH:  Marital: . Personal Habits: Cigarette Use: Former Cigarette Smoker 1 Pack Daily - for 30 years Negative For  current cigarette smoker. Alcohol: Occasionally consumes alcohol. Daily Caffeine: Consumes on  average 4 cups of hot tea per day - more recently 2 cups. Exercise Type: Unable to walk long distance. .  Reviewed, no changes. Date: 2022  Was the patient queried about smoking behavior? Yes   Does the patient currently smoke? Smoking: Patient is a former smoker.     Current Outpatient Medications:     lisinopril (PRINIVIL;ZESTRIL) 20 MG tablet, Take 1 tablet by mouth daily, Disp: 90 tablet, Rfl: 1    metoprolol succinate (TOPROL XL) 25 MG extended release tablet, Take 1 tablet by mouth daily, Disp: 90 tablet, Rfl: 1    pravastatin (PRAVACHOL) 40 MG tablet, Take 1 tablet by mouth daily, Disp: 90 tablet, Rfl: 1    levETIRAcetam (KEPPRA) 500 MG tablet, Take 1 tablet by mouth 2 times daily, Disp: 180 tablet, Rfl: 1    morphine (MS CONTIN) 15 MG extended release tablet, TAKE ONE TABLET BY MOUTH EVERY 8 HOURS start 1/29/22 end 2/28/22, Disp: , Rfl:     oxyCODONE-acetaminophen (PERCOCET)  MG per tablet, TAKE ONE TABLET BY MOUTH THREE TIMES DAILY AS NEEDED FOR PAIN START 11/28 END 12/27/21, Disp: , Rfl:     vitamin C (ASCORBIC ACID) 500 MG tablet, Take 500 mg by mouth every other day, Disp: , Rfl:   Allergies   Allergen Reactions    Statins Other (See Comments)     Myalgias        Past Medical History:   Diagnosis Date    Basal cell carcinoma     CAD (coronary artery disease) 11/2019    Cardiomegaly 05/17/2016    Chronic back pain     Chronic back pain     CPAP (continuous positive airway pressure) dependence     Erectile dysfunction     Erectile dysfunction     Esophagitis 09/2011    VITO    H/O cardiomegaly     NORMAL ECHO IN 2016    H/O hypogonadism 2012    History of basal cell carcinoma     History of pneumonia 2016    History of sebaceous cyst     Hyperlipidemia     Hypertension     Hypogonadism in male 12/18/2012    Lumbar radiculopathy 08/17/2015    Obesity     Obesity     Osteoarthritis     Postoperative atrial fibrillation ([address] 12/24/2019    Seizure ([address] 05/29/2020    Sleep apnea     Stroke ([address] 11/2019    No residual     Past Surgical History:   Procedure Laterality Date    CARDIAC CATHETERIZATION  12/10/2019    scavina  CT surgery consult    CORONARY ARTERY BYPASS GRAFT N/A 12/23/2019    CABG CORONARY ARTERY BYPASS, SHANTELL performed by Onesimo De León MD at [address] [address]    [address]    ESOPHGOSCOPY  9/10/2011    foreign body removal    JOINT REPLACEMENT Bilateral 2004    knees    KNEE SURGERY  2004    bilateral knee replacements    SHOULDER SURGERY  1997    right rotator cuff    SHOULDER SURGERY Right 1997    SKIN CANCER EXCISION  2003    [facility]    STIMULATOR SURGERY Bilateral 7/29/2022    [address] SPINAL CORD STIMULATOR INSERTION performed by Russ Smiley MD at [address] VASE[facility]OMY      AGE 33     Family History   Problem Relation Age of Onset    Cancer Mother     Stroke Father     Stroke Sister     No Known Problems Brother     Stroke Sister     No Known Problems Sister     No Known Problems Sister     No Known Problems Sister      Social History     Socioeconomic History    Marital status:      Spouse name: Not on file    Number of children: 2    Years of education: Not on file    Highest education level: Not on file   Occupational History    Not on file   Tobacco Use    Smoking status: Former     Packs/day: 1.50     Years: 31.00     Pack years: 46.50     Types: Cigarettes     Start date: 1966     Quit date:      Years since quittin.2    Smokeless tobacco: Never   Vaping Use    Vaping Use: Never used   Substance and Sexual Activity    Alcohol use: Not Currently     Alcohol/week: 8.0 standard drinks     Types: 8 Drinks containing 0.5 oz of alcohol per week     Comment: drinks daily during summer months    Drug use: No    Sexual activity: Yes     Partners: Female   Other Topics Concern    Not on file   Social History Narrative    Not on file     Social Determinants of Health     Financial Resource Strain: Low Risk     Difficulty of Paying Living Expenses: Not hard at all   Food Insecurity: No Food Insecurity    Worried About Running Out of Food in the Last Year: Never true    [address] in the Last Year: Never true   Transportation Needs: Unknown    Lack of Transportation (Medical): Not on file    Lack of Transportation (Non-Medical):  No   Physical Activity: Not on file   Stress: Not on file   Social Connections: Not on file   Intimate Partner Violence: Not on file   Housing Stability: Unknown    Unable to Pay for Housing in the Last Year: Not on file    Number of Places Lived in the Last Year: Not on file    Unstable Housing in the Last Year: No       Vitals:    23 1513   BP: (!) 152/90   Pulse: 67   Temp: 98 °F (36.7 °C)   TempSrc: Temporal   SpO2: 96%   Weight: 274 lb (124.3 kg)       Physical Exam  Exam:  Const: Appears healthy,well developed and well nourished. Appears obese. Eyes: EOMI in both eyes. PERRL. ENMT: External canals are clear and dry. Tympanic membranes: thickening. External nose WNL. Neck: Supple and symmetric. Palpation reveals no adenopathy. No masses appreciated. Thyroid  exhibits no nodules or thyromegaly. No JVD. Resp: Respirations are unlabored. Respiration rate is normal. Auscultate good airflow. No rales,  rhonchi or wheezes appreciated over the lungs bilaterally. CV: Rhythm is regular. S1 is normal. S2 is normal. No heart murmur appreciated. Carotids: no  bruits. Abdominal aorta is not palpable. Pedal pulses: 2+ and equal bilaterally No abdominal bruits. Extremities: No clubbing, cyanosis or edema is 1-2+ below the mid tibia bilaterally. Abdomen: Bowel sounds are normoactive. Palpation of the abdomen reveals softness, but no  distension, organomegaly or tenderness. No abdominal masses. No palpable hepatosplenomegaly. Musculo: Walks with a limping gait. Upper Extremities: Full ROM bilaterally. Lower Extremities:  Limitation of the lower extremities. Skin: Dry and warm with no rash. Neuro: Alert and oriented x3. Mood is normal. Affect is normal. Speech is articulate and fluent. Reflexes: DTR's are intact, symmetric and 2+ bilaterally. Psych: Patient's attitude is cooperative. Patient's affect is appropriate. Judgement is realistic. Insight  is appropriate. Diagnoses and all orders for this visit:    Coronary artery disease involving native coronary artery of native heart without angina pectoris  -     lisinopril (PRINIVIL;ZESTRIL) 20 MG tablet; Take 1 tablet by mouth daily  -     metoprolol succinate (TOPROL XL) 25 MG extended release tablet; Take 1 tablet by mouth daily  -     pravastatin (PRAVACHOL) 40 MG tablet; Take 1 tablet by mouth daily  -     levETIRAcetam (KEPPRA) 500 MG tablet;  Take 1 tablet by mouth 2 times daily    Primary hypertension    Hypogonadism in male    Primary osteoarthritis involving multiple joints  -     lisinopril (PRINIVIL;ZESTRIL) 20 MG tablet; Take 1 tablet by mouth daily  -     metoprolol succinate (TOPROL XL) 25 MG extended release tablet; Take 1 tablet by mouth daily  -     pravastatin (PRAVACHOL) 40 MG tablet; Take 1 tablet by mouth daily  -     levETIRAcetam (KEPPRA) 500 MG tablet; Take 1 tablet by mouth 2 times daily    Postoperative atrial fibrillation (HCC)    Seizure as late effect of cerebrovascular accident ([facility]) [facility]    Essential hypertension  -     lisinopril (PRINIVIL;ZESTRIL) 20 MG tablet; Take 1 tablet by mouth daily  -     metoprolol succinate (TOPROL XL) 25 MG extended release tablet; Take 1 tablet by mouth daily  -     pravastatin (PRAVACHOL) 40 MG tablet; Take 1 tablet by mouth daily  -     levETIRAcetam (KEPPRA) 500 MG tablet; Take 1 tablet by mouth 2 times daily    Mixed hyperlipidemia  -     lisinopril (PRINIVIL;ZESTRIL) 20 MG tablet; Take 1 tablet by mouth daily  -     metoprolol succinate (TOPROL XL) 25 MG extended release tablet; Take 1 tablet by mouth daily  -     pravastatin (PRAVACHOL) 40 MG tablet; Take 1 tablet by mouth daily  -     levETIRAcetam (KEPPRA) 500 MG tablet; Take 1 tablet by mouth 2 times daily  Patient is to have lab work today. Medical management is performed. I did discuss with him his pain management at this point time. He is to be seen back in 6 months. I will let him know the results of his lab testing.

## 2023-03-10 DIAGNOSIS — R73.03 PREDIABETES: Primary | ICD-10-CM

## 2023-03-10 DIAGNOSIS — R73.03 PREDIABETES: ICD-10-CM

## 2023-03-10 LAB — HBA1C MFR BLD: 6.3 % (ref 4–5.6)

## 2023-03-11 LAB — KEPPRA: 9 UG/ML (ref 10–40)

## 2023-03-15 DIAGNOSIS — I25.10 CORONARY ARTERY DISEASE INVOLVING NATIVE CORONARY ARTERY OF NATIVE HEART WITHOUT ANGINA PECTORIS: ICD-10-CM

## 2023-03-15 DIAGNOSIS — E78.2 MIXED HYPERLIPIDEMIA: ICD-10-CM

## 2023-03-15 DIAGNOSIS — R56.9 SEIZURE AS LATE EFFECT OF CEREBROVASCULAR ACCIDENT (CVA) (HCC): Primary | ICD-10-CM

## 2023-03-15 DIAGNOSIS — M15.9 PRIMARY OSTEOARTHRITIS INVOLVING MULTIPLE JOINTS: ICD-10-CM

## 2023-03-15 DIAGNOSIS — I10 ESSENTIAL HYPERTENSION: ICD-10-CM

## 2023-03-15 DIAGNOSIS — I69.398 SEIZURE AS LATE EFFECT OF CEREBROVASCULAR ACCIDENT (CVA) (HCC): Primary | ICD-10-CM

## 2023-03-15 RX ORDER — LEVETIRACETAM 500 MG/1
750 TABLET ORAL 2 TIMES DAILY
Qty: 180 TABLET | Refills: 1 | Status: SHIPPED | OUTPATIENT
Start: 2023-03-15

## 2023-04-28 DIAGNOSIS — I10 ESSENTIAL HYPERTENSION: ICD-10-CM

## 2023-04-28 DIAGNOSIS — E78.2 MIXED HYPERLIPIDEMIA: ICD-10-CM

## 2023-04-28 DIAGNOSIS — M15.9 PRIMARY OSTEOARTHRITIS INVOLVING MULTIPLE JOINTS: ICD-10-CM

## 2023-04-28 DIAGNOSIS — I25.10 CORONARY ARTERY DISEASE INVOLVING NATIVE CORONARY ARTERY OF NATIVE HEART WITHOUT ANGINA PECTORIS: ICD-10-CM

## 2023-04-28 RX ORDER — LISINOPRIL 20 MG/1
20 TABLET ORAL DAILY
Qty: 90 TABLET | Refills: 1 | Status: SHIPPED | OUTPATIENT
Start: 2023-04-28

## 2023-04-28 RX ORDER — METOPROLOL SUCCINATE 25 MG/1
25 TABLET, EXTENDED RELEASE ORAL DAILY
Qty: 90 TABLET | Refills: 1 | Status: SHIPPED | OUTPATIENT
Start: 2023-04-28

## 2023-04-28 RX ORDER — PRAVASTATIN SODIUM 40 MG
40 TABLET ORAL DAILY
Qty: 90 TABLET | Refills: 1 | Status: SHIPPED | OUTPATIENT
Start: 2023-04-28

## 2023-05-05 ENCOUNTER — OFFICE VISIT (OUTPATIENT)
Dept: FAMILY MEDICINE CLINIC | Age: 73
End: 2023-05-05
Payer: MEDICARE

## 2023-05-05 VITALS
RESPIRATION RATE: 18 BRPM | SYSTOLIC BLOOD PRESSURE: 158 MMHG | DIASTOLIC BLOOD PRESSURE: 80 MMHG | HEIGHT: 72 IN | BODY MASS INDEX: 37.11 KG/M2 | TEMPERATURE: 97.9 F | WEIGHT: 274 LBS | HEART RATE: 96 BPM | OXYGEN SATURATION: 97 %

## 2023-05-05 DIAGNOSIS — J02.9 SORE THROAT: ICD-10-CM

## 2023-05-05 DIAGNOSIS — R09.81 SINUS CONGESTION: ICD-10-CM

## 2023-05-05 DIAGNOSIS — J01.90 ACUTE NON-RECURRENT SINUSITIS, UNSPECIFIED LOCATION: Primary | ICD-10-CM

## 2023-05-05 PROCEDURE — 3077F SYST BP >= 140 MM HG: CPT | Performed by: NURSE PRACTITIONER

## 2023-05-05 PROCEDURE — 3079F DIAST BP 80-89 MM HG: CPT | Performed by: NURSE PRACTITIONER

## 2023-05-05 PROCEDURE — 99213 OFFICE O/P EST LOW 20 MIN: CPT | Performed by: NURSE PRACTITIONER

## 2023-05-05 PROCEDURE — 1123F ACP DISCUSS/DSCN MKR DOCD: CPT | Performed by: NURSE PRACTITIONER

## 2023-05-05 RX ORDER — CETIRIZINE HYDROCHLORIDE 10 MG/1
10 TABLET ORAL DAILY PRN
Qty: 30 TABLET | Refills: 0 | Status: SHIPPED | OUTPATIENT
Start: 2023-05-05

## 2023-05-05 RX ORDER — AMOXICILLIN 875 MG/1
875 TABLET, COATED ORAL 2 TIMES DAILY
Qty: 20 TABLET | Refills: 0 | Status: SHIPPED | OUTPATIENT
Start: 2023-05-05 | End: 2023-05-15

## 2023-05-05 NOTE — PROGRESS NOTES
22, Disp: , Rfl:     oxyCODONE-acetaminophen (PERCOCET)  MG per tablet, TAKE ONE TABLET BY MOUTH THREE TIMES DAILY AS NEEDED FOR PAIN START  END 21, Disp: , Rfl:     vitamin C (ASCORBIC ACID) 500 MG tablet, Take 1 tablet by mouth every other day, Disp: , Rfl:     Allergies: Allergies   Allergen Reactions    Statins Other (See Comments)     Myalgias        Social History:     Social History     Tobacco Use    Smoking status: Former     Packs/day: 1.50     Years: 31.00     Pack years: 46.50     Types: Cigarettes     Start date: 1966     Quit date:      Years since quittin.3    Smokeless tobacco: Never   Vaping Use    Vaping Use: Never used   Substance Use Topics    Alcohol use: Not Currently     Alcohol/week: 8.0 standard drinks     Types: 8 Drinks containing 0.5 oz of alcohol per week     Comment: drinks daily during summer months    Drug use: No       Physical Exam:     Vitals:    23 0824 23 0830   BP: (!) 158/80 (!) 158/80   Pulse: 96    Resp: 18    Temp: 97.9 °F (36.6 °C)    TempSrc: Temporal    SpO2: 97%    Weight: 274 lb (124.3 kg)    Height: 6' (1.829 m)        Physical Exam (PE)    Physical Exam  Constitutional:       Appearance: Normal appearance. HENT:      Head: Normocephalic. Right Ear: Tympanic membrane, ear canal and external ear normal.      Left Ear: Tympanic membrane, ear canal and external ear normal.      Nose: Congestion and rhinorrhea present. Mouth/Throat:      Mouth: Mucous membranes are moist.      Pharynx: Oropharynx is clear. Posterior oropharyngeal erythema present. No oropharyngeal exudate. Eyes:      Pupils: Pupils are equal, round, and reactive to light. Cardiovascular:      Rate and Rhythm: Normal rate and regular rhythm. Pulses: Normal pulses. Heart sounds: Normal heart sounds. Pulmonary:      Effort: Pulmonary effort is normal.      Breath sounds: Normal breath sounds. No wheezing, rhonchi or rales.

## 2023-08-14 ENCOUNTER — TELEPHONE (OUTPATIENT)
Dept: FAMILY MEDICINE CLINIC | Age: 73
End: 2023-08-14

## 2023-08-14 NOTE — TELEPHONE ENCOUNTER
----- Message from [facility] sent at 8/14/2023  9:46 AM EDT -----  Subject: Appointment Request    Reason for Call: Established Patient Appointment needed: Routine Pre-Op    QUESTIONS    Reason for appointment request? No appointments available during search     Additional Information for Provider? Patient has hip surgery on 09/18 with   Dr Aline Carrasco at [facility], needs preop appt.  Please call .  ---------------------------------------------------------------------------  --------------  Juan Carlos OVERTON  [phone]; OK to leave message on voicemail  ---------------------------------------------------------------------------  --------------  SCRIPT ANSWERS

## 2023-08-15 ENCOUNTER — TELEPHONE (OUTPATIENT)
Dept: PRIMARY CARE CLINIC | Age: 73
End: 2023-08-15

## 2023-08-15 DIAGNOSIS — L60.9 NAIL ABNORMALITIES: Primary | ICD-10-CM

## 2023-08-15 NOTE — TELEPHONE ENCOUNTER
Pt called in stating that he is unable to cut his toenails, and asked for a podiatry referral.   He is hoping to get in before his surgery on Sept 18th.

## 2023-08-31 ENCOUNTER — OFFICE VISIT (OUTPATIENT)
Dept: PRIMARY CARE CLINIC | Age: 73
End: 2023-08-31
Payer: MEDICARE

## 2023-08-31 VITALS
OXYGEN SATURATION: 94 % | HEIGHT: 72 IN | DIASTOLIC BLOOD PRESSURE: 82 MMHG | BODY MASS INDEX: 35.76 KG/M2 | WEIGHT: 264 LBS | SYSTOLIC BLOOD PRESSURE: 136 MMHG | TEMPERATURE: 97.9 F | HEART RATE: 83 BPM

## 2023-08-31 DIAGNOSIS — I10 ESSENTIAL HYPERTENSION: ICD-10-CM

## 2023-08-31 DIAGNOSIS — I25.10 CORONARY ARTERY DISEASE INVOLVING NATIVE CORONARY ARTERY OF NATIVE HEART WITHOUT ANGINA PECTORIS: ICD-10-CM

## 2023-08-31 DIAGNOSIS — E78.2 MIXED HYPERLIPIDEMIA: ICD-10-CM

## 2023-08-31 DIAGNOSIS — Z01.818 PREOP GENERAL PHYSICAL EXAM: Primary | ICD-10-CM

## 2023-08-31 DIAGNOSIS — M15.9 PRIMARY OSTEOARTHRITIS INVOLVING MULTIPLE JOINTS: ICD-10-CM

## 2023-08-31 PROCEDURE — 1123F ACP DISCUSS/DSCN MKR DOCD: CPT | Performed by: NURSE PRACTITIONER

## 2023-08-31 PROCEDURE — 3079F DIAST BP 80-89 MM HG: CPT | Performed by: NURSE PRACTITIONER

## 2023-08-31 PROCEDURE — 3075F SYST BP GE 130 - 139MM HG: CPT | Performed by: NURSE PRACTITIONER

## 2023-08-31 PROCEDURE — 99213 OFFICE O/P EST LOW 20 MIN: CPT | Performed by: NURSE PRACTITIONER

## 2023-08-31 RX ORDER — ASPIRIN 81 MG/1
81 TABLET ORAL DAILY
COMMUNITY

## 2023-08-31 RX ORDER — OXYCODONE AND ACETAMINOPHEN 7.5; 325 MG/1; MG/1
TABLET ORAL
COMMUNITY
Start: 2023-08-21

## 2023-08-31 RX ORDER — LEVETIRACETAM 500 MG/1
750 TABLET ORAL 2 TIMES DAILY
Qty: 180 TABLET | Refills: 1 | Status: CANCELLED | OUTPATIENT
Start: 2023-08-31

## 2023-08-31 RX ORDER — MORPHINE SULFATE 30 MG/1
TABLET, FILM COATED, EXTENDED RELEASE ORAL
COMMUNITY
Start: 2023-08-22

## 2023-08-31 NOTE — PROGRESS NOTES
Chuck Schmidt : 1950 Sex: male  Age: 68 y.o. Chief Complaint   Patient presents with    Pre-op Exam     Right hip surgery Dr. Kamlesh De La Torre at [facility]     Patient is to have total hip replacement on  per Dr. Kamlesh De La Torre. He does have history significant for coronary artery disease, status post multivessel bypass, ischemic CVA, BEBETO, obesity. He follows with cardiology, reviewed most recent note. He is to have stress test completed on  prior to surgery because he is unable to complete 4 metabolic equivalents of activity. He did have preoperative lab work completed at [facility] which we will obtain to review. Hypertension    Hyperlipidemia    Other    Allergy - statins  No problems with anesthesia in the past  No neck or throat surgeries      Review of Systems   HENT: Negative. Cardiovascular:         CAD   Endocrine:        Obesity   Musculoskeletal:         Osteoarthritis   Psychiatric/Behavioral: Negative. Health Maintenance:  Colonoscopy - (2020) Anayeli diverticular dx  Colonoscopy Screening - (2017)  Couseled on Home Safety - (5/10/2017)  Physical Exam - (3/9/2023)  Prostate Exam - (2021)  Psa Test - (2022)  Rectal Exam - (2021)  EKG  Colonoscopy - (2017) Janet Nina  EGD - () VITO  Sleep Study - (2014)   Influenza Vaccination - ()  Prevnar Vaccine - (2018) SLIP GIVEN  Shingrix Vaccine (Shingles) - (2018) [address] VACCINE   1. Obesity. 2. Hyperlipidemia. BEGAN SIMVISTATIN 2/1/10  3. HYPERTENSION BEGAN LISINOPRIL/HCT 2/1/10  4. Erectile dysfunction. 5. History of basal cell carcinoma. 6. History of previous sebaceous cyst.  7. OA HAD BILATERAL KNEE REPLACEMENTS  8. CHRONIC BACK PAIN- TASH EXERCISES RECOMMENDED 3/11  9. ESOPHAGITIS/PUD-  VITO  10. FEVER/DIARRHEA/RASH - PROBABLY VIRAL 12 ADMITTED TO [facility]- SEE REPORTS  11. HYPOGONADISM- MRI ORDERED 12  12.  SNORING-FATIGUE- SLEEP APNEA- SLEEP STUDY ORDERED

## 2023-09-12 ENCOUNTER — HOSPITAL ENCOUNTER (OUTPATIENT)
Age: 73
Discharge: HOME | End: 2023-09-14

## 2023-09-12 LAB
ABO + RH BLD: NORMAL
ARM BAND NUMBER: NORMAL
BLOOD BANK SAMPLE EXPIRATION: NORMAL
BLOOD GROUP ANTIBODIES SERPL: NEGATIVE

## 2023-09-12 PROCEDURE — 86901 BLOOD TYPING SEROLOGIC RH(D): CPT

## 2023-09-12 PROCEDURE — 86900 BLOOD TYPING SEROLOGIC ABO: CPT

## 2023-09-12 PROCEDURE — 87081 CULTURE SCREEN ONLY: CPT

## 2023-09-12 PROCEDURE — 86850 RBC ANTIBODY SCREEN: CPT

## 2023-09-14 LAB
MICROORGANISM SPEC CULT: ABNORMAL
SPECIMEN DESCRIPTION: ABNORMAL

## 2023-09-19 ENCOUNTER — HOSPITAL ENCOUNTER (OUTPATIENT)
Age: 73
Discharge: HOME | End: 2023-09-21

## 2023-09-19 LAB
ANION GAP SERPL CALCULATED.3IONS-SCNC: 16 MMOL/L (ref 7–16)
BUN SERPL-MCNC: 13 MG/DL (ref 6–23)
CALCIUM SERPL-MCNC: 9.2 MG/DL (ref 8.6–10.2)
CHLORIDE SERPL-SCNC: 101 MMOL/L (ref 98–107)
CO2 SERPL-SCNC: 21 MMOL/L (ref 22–29)
CREAT SERPL-MCNC: 0.8 MG/DL (ref 0.7–1.2)
ERYTHROCYTE [DISTWIDTH] IN BLOOD BY AUTOMATED COUNT: 12.2 % (ref 11.5–15)
GFR SERPL CREATININE-BSD FRML MDRD: >60 ML/MIN/1.73M2
GLUCOSE SERPL-MCNC: 243 MG/DL (ref 74–99)
HCT VFR BLD AUTO: 44.8 % (ref 37–54)
HGB BLD-MCNC: 14.6 G/DL (ref 12.5–16.5)
MCH RBC QN AUTO: 33.4 PG (ref 26–35)
MCHC RBC AUTO-ENTMCNC: 32.6 G/DL (ref 32–34.5)
MCV RBC AUTO: 102.5 FL (ref 80–99.9)
PLATELET # BLD AUTO: 283 K/UL (ref 130–450)
PMV BLD AUTO: 11.3 FL (ref 7–12)
POTASSIUM SERPL-SCNC: 5.4 MMOL/L (ref 3.5–5)
RBC # BLD AUTO: 4.37 M/UL (ref 3.8–5.8)
SODIUM SERPL-SCNC: 138 MMOL/L (ref 132–146)
WBC OTHER # BLD: 13.5 K/UL (ref 4.5–11.5)

## 2023-09-19 PROCEDURE — 80048 BASIC METABOLIC PNL TOTAL CA: CPT

## 2023-09-19 PROCEDURE — 85027 COMPLETE CBC AUTOMATED: CPT

## 2023-10-23 DIAGNOSIS — I25.10 CORONARY ARTERY DISEASE INVOLVING NATIVE CORONARY ARTERY OF NATIVE HEART WITHOUT ANGINA PECTORIS: ICD-10-CM

## 2023-10-23 DIAGNOSIS — E78.2 MIXED HYPERLIPIDEMIA: ICD-10-CM

## 2023-10-23 DIAGNOSIS — M15.9 PRIMARY OSTEOARTHRITIS INVOLVING MULTIPLE JOINTS: ICD-10-CM

## 2023-10-23 DIAGNOSIS — I10 ESSENTIAL HYPERTENSION: ICD-10-CM

## 2023-10-23 RX ORDER — METOPROLOL SUCCINATE 25 MG/1
25 TABLET, EXTENDED RELEASE ORAL DAILY
Qty: 90 TABLET | Refills: 1 | Status: SHIPPED | OUTPATIENT
Start: 2023-10-23

## 2023-10-23 NOTE — TELEPHONE ENCOUNTER
Last Appointment:  3/9/2023  Future Appointments   Date Time Provider [address]   11/16/2023  3:30 PM Amy Joshua MD [facility] [place]

## 2023-11-16 ENCOUNTER — OFFICE VISIT (OUTPATIENT)
Dept: PRIMARY CARE CLINIC | Age: 73
End: 2023-11-16

## 2023-11-16 VITALS
BODY MASS INDEX: 36.57 KG/M2 | SYSTOLIC BLOOD PRESSURE: 136 MMHG | WEIGHT: 270 LBS | TEMPERATURE: 97.4 F | HEART RATE: 84 BPM | HEIGHT: 72 IN | OXYGEN SATURATION: 97 % | DIASTOLIC BLOOD PRESSURE: 82 MMHG

## 2023-11-16 VITALS
HEIGHT: 72 IN | HEART RATE: 84 BPM | OXYGEN SATURATION: 97 % | DIASTOLIC BLOOD PRESSURE: 82 MMHG | SYSTOLIC BLOOD PRESSURE: 136 MMHG | WEIGHT: 270 LBS | TEMPERATURE: 97.4 F | BODY MASS INDEX: 36.57 KG/M2

## 2023-11-16 DIAGNOSIS — N40.1 BENIGN PROSTATIC HYPERPLASIA WITH INCOMPLETE BLADDER EMPTYING: Primary | ICD-10-CM

## 2023-11-16 DIAGNOSIS — I25.10 CORONARY ARTERY DISEASE INVOLVING NATIVE CORONARY ARTERY OF NATIVE HEART WITHOUT ANGINA PECTORIS: ICD-10-CM

## 2023-11-16 DIAGNOSIS — Z00.00 ENCOUNTER FOR SUBSEQUENT ANNUAL WELLNESS VISIT (AWV) IN MEDICARE PATIENT: ICD-10-CM

## 2023-11-16 DIAGNOSIS — E78.2 MIXED HYPERLIPIDEMIA: ICD-10-CM

## 2023-11-16 DIAGNOSIS — M15.9 PRIMARY OSTEOARTHRITIS INVOLVING MULTIPLE JOINTS: ICD-10-CM

## 2023-11-16 DIAGNOSIS — I10 ESSENTIAL HYPERTENSION: ICD-10-CM

## 2023-11-16 DIAGNOSIS — Z12.5 PROSTATE CANCER SCREENING: ICD-10-CM

## 2023-11-16 DIAGNOSIS — Z00.00 INITIAL MEDICARE ANNUAL WELLNESS VISIT: Primary | ICD-10-CM

## 2023-11-16 DIAGNOSIS — R39.14 BENIGN PROSTATIC HYPERPLASIA WITH INCOMPLETE BLADDER EMPTYING: Primary | ICD-10-CM

## 2023-11-16 LAB
ALBUMIN SERPL-MCNC: 3.9 G/DL (ref 3.5–5.2)
ALP BLD-CCNC: 90 U/L (ref 40–129)
ALT SERPL-CCNC: 27 U/L (ref 0–40)
ANION GAP SERPL CALCULATED.3IONS-SCNC: 23 MMOL/L (ref 7–16)
AST SERPL-CCNC: 37 U/L (ref 0–39)
BILIRUB SERPL-MCNC: 0.3 MG/DL (ref 0–1.2)
BUN BLDV-MCNC: 11 MG/DL (ref 6–23)
CALCIUM SERPL-MCNC: 9.7 MG/DL (ref 8.6–10.2)
CHLORIDE BLD-SCNC: 101 MMOL/L (ref 98–107)
CHOLESTEROL: 165 MG/DL
CO2: 17 MMOL/L (ref 22–29)
CREAT SERPL-MCNC: 0.7 MG/DL (ref 0.7–1.2)
GFR SERPL CREATININE-BSD FRML MDRD: >60 ML/MIN/1.73M2
GLUCOSE BLD-MCNC: 128 MG/DL (ref 74–99)
HBA1C MFR BLD: 6.2 % (ref 4–5.6)
HCT VFR BLD CALC: 45.3 % (ref 37–54)
HDLC SERPL-MCNC: 49 MG/DL
HEMOGLOBIN: 14.9 G/DL (ref 12.5–16.5)
LDL CHOLESTEROL: 74 MG/DL
POTASSIUM SERPL-SCNC: 4.2 MMOL/L (ref 3.5–5)
PROSTATE SPECIFIC ANTIGEN: 5.79 NG/ML (ref 0–4)
SODIUM BLD-SCNC: 141 MMOL/L (ref 132–146)
TOTAL PROTEIN: 8.3 G/DL (ref 6.4–8.3)
TRIGL SERPL-MCNC: 212 MG/DL
VLDLC SERPL CALC-MCNC: 42 MG/DL

## 2023-11-16 RX ORDER — PRAVASTATIN SODIUM 40 MG
40 TABLET ORAL DAILY
Qty: 90 TABLET | Refills: 1 | Status: SHIPPED | OUTPATIENT
Start: 2023-11-16

## 2023-11-16 RX ORDER — TAMSULOSIN HYDROCHLORIDE 0.4 MG/1
0.4 CAPSULE ORAL DAILY
Qty: 30 CAPSULE | Refills: 5 | Status: SHIPPED | OUTPATIENT
Start: 2023-11-16

## 2023-11-16 RX ORDER — LISINOPRIL 20 MG/1
20 TABLET ORAL DAILY
Qty: 90 TABLET | Refills: 1 | Status: SHIPPED | OUTPATIENT
Start: 2023-11-16

## 2023-11-16 ASSESSMENT — PATIENT HEALTH QUESTIONNAIRE - PHQ9
2. FEELING DOWN, DEPRESSED OR HOPELESS: 0
SUM OF ALL RESPONSES TO PHQ QUESTIONS 1-9: 0
SUM OF ALL RESPONSES TO PHQ9 QUESTIONS 1 & 2: 0
1. LITTLE INTEREST OR PLEASURE IN DOING THINGS: 0

## 2023-11-16 ASSESSMENT — LIFESTYLE VARIABLES
HOW OFTEN DO YOU HAVE A DRINK CONTAINING ALCOHOL: 2-3 TIMES A WEEK
HOW MANY STANDARD DRINKS CONTAINING ALCOHOL DO YOU HAVE ON A TYPICAL DAY: 1 OR 2

## 2023-11-16 NOTE — PATIENT INSTRUCTIONS
Starting a Weight Loss Plan: Care Instructions  Overview     If you're thinking about losing weight, it can be hard to know where to start. Your doctor can help you set up a weight loss plan that best meets your needs. You may want to take a class on nutrition or exercise, or you could join a weight loss support group. If you have questions about how to make changes to your eating or exercise habits, ask your doctor about seeing a registered dietitian or an exercise specialist.  It can be a big challenge to lose weight. But you don't have to make huge changes at once. Make small changes, and stick with them. When those changes become habit, add a few more changes. If you don't think you're ready to make changes right now, try to pick a date in the future. Make an appointment to see your doctor to discuss whether the time is right for you to start a plan. Follow-up care is a key part of your treatment and safety. Be sure to make and go to all appointments, and call your doctor if you are having problems. It's also a good idea to know your test results and keep a list of the medicines you take. How can you care for yourself at home? Set realistic goals. Many people expect to lose much more weight than is likely. A weight loss of 5% to 10% of your body weight may be enough to improve your health. Get family and friends involved to provide support. Talk to them about why you are trying to lose weight, and ask them to help. They can help by participating in exercise and having meals with you, even if they may be eating something different. Find what works best for you. If you do not have time or do not like to cook, a program that offers meal replacement bars or shakes may be better for you. Or if you like to prepare meals, finding a plan that includes daily menus and recipes may be best.  Ask your doctor about other health professionals who can help you achieve your weight loss goals.   A dietitian can help
Normal rate, regular rhythm.  Heart sounds S1, S2.  No murmurs, rubs or gallops.

## 2023-11-16 NOTE — PROGRESS NOTES
Hematocrit; Future  -     Lipid Panel; Future    Prostate cancer screening  -     PSA Screening; Future    I would be concerned about BPH with urinary retention and possible neurogenic bladder with the possibility of hydronephrosis. The patient will be trialed on Flomax and subsequently in 3 weeks have bladder ultrasound with postvoid residual.  If this is showing significant retention then the patient will need urologic referral.  Lab work will be obtained today including PSA levels.

## 2023-11-20 DIAGNOSIS — N40.1 BENIGN PROSTATIC HYPERPLASIA WITH INCOMPLETE BLADDER EMPTYING: ICD-10-CM

## 2023-11-20 DIAGNOSIS — R39.14 BENIGN PROSTATIC HYPERPLASIA WITH INCOMPLETE BLADDER EMPTYING: ICD-10-CM

## 2023-11-20 RX ORDER — TAMSULOSIN HYDROCHLORIDE 0.4 MG/1
0.4 CAPSULE ORAL DAILY
Qty: 90 CAPSULE | Refills: 1 | Status: SHIPPED | OUTPATIENT
Start: 2023-11-20

## 2023-12-08 ENCOUNTER — OFFICE VISIT (OUTPATIENT)
Dept: PODIATRY | Age: 73
End: 2023-12-08

## 2023-12-08 VITALS — WEIGHT: 270 LBS | BODY MASS INDEX: 36.57 KG/M2 | HEIGHT: 72 IN

## 2023-12-08 DIAGNOSIS — G60.8 HEREDITARY SENSORY NEUROPATHY: ICD-10-CM

## 2023-12-08 DIAGNOSIS — B35.1 TINEA UNGUIUM: Primary | ICD-10-CM

## 2023-12-08 NOTE — PROGRESS NOTES
Phuong Woodall is here today as a new patient to begin routine nail care. Possible nail fungus. PCP Dr. Rufus Wheeler, last seen 2023.        23  [facility] : 1950 Sex: male  Age: 68 y.o. Patient was referred by Mark Curry MD    CC:   Painful elongated toenails 1-5 right and left    HPI  This pleasant 81-year-old male patient referred to me today foot ankle exam.  Toenails some callus tissue both feet. Denies any foot or ankle pain. Difficulty managing toenails due to his back. Denies calf pain. Denies history of diabetes. Does present today with his wife. Here today for painful elongated toenails 1-5 right and left. No additional pedal complaints at this time. ROS:  Const: Denies constitutional symptoms  Musculo: Denies symptoms other than stated above  Skin: Denies symptoms other than stated above      Current Outpatient Medications:     tamsulosin (FLOMAX) 0.4 MG capsule, Take 1 capsule by mouth daily, Disp: 90 capsule, Rfl: 1    lisinopril (PRINIVIL;ZESTRIL) 20 MG tablet, Take 1 tablet by mouth daily, Disp: 90 tablet, Rfl: 1    pravastatin (PRAVACHOL) 40 MG tablet, Take 1 tablet by mouth daily, Disp: 90 tablet, Rfl: 1    metoprolol succinate (TOPROL XL) 25 MG extended release tablet, take 1 tablet by mouth once daily, Disp: 90 tablet, Rfl: 1    morphine (MS CONTIN) 30 MG extended release tablet, take 1 tablet by mouth every 12 hours for 30 DAYS START 23 END 23, Disp: , Rfl:     oxyCODONE-acetaminophen (PERCOCET) 7.5-325 MG per tablet, take 1 tablet by mouth three times a day if needed for pain for 3...  (REFER TO PRESCRIPTION NOTES). , Disp: , Rfl:     aspirin 81 MG EC tablet, Take 1 tablet by mouth daily, Disp: , Rfl:     levETIRAcetam (KEPPRA) 500 MG tablet, Take 1.5 tablets by mouth 2 times daily, Disp: 180 tablet, Rfl: 1  Allergies   Allergen Reactions    Statins Other (See Comments)     Myalgias        Past Medical History:   Diagnosis Date    Basal cell carcinoma

## 2023-12-12 ENCOUNTER — CLINICAL DOCUMENTATION (OUTPATIENT)
Dept: FAMILY MEDICINE CLINIC | Age: 73
End: 2023-12-12

## 2023-12-12 NOTE — PROGRESS NOTES
Patient called office to discuss US results. Results given. He said he will think about going to a urologist and will call us back.

## 2024-01-17 NOTE — TELEPHONE ENCOUNTER
Attempted to call the [facility] on [address] in [place]. Called twice with no answer either time. [phone]   I need the nuclear stress test result. Please call and get the result from them.   Thanks

## 2024-02-09 ENCOUNTER — PROCEDURE VISIT (OUTPATIENT)
Dept: PODIATRY | Age: 74
End: 2024-02-09
Payer: MEDICARE

## 2024-02-09 DIAGNOSIS — B35.1 TINEA UNGUIUM: Primary | ICD-10-CM

## 2024-02-09 DIAGNOSIS — G60.8 HEREDITARY SENSORY NEUROPATHY: ICD-10-CM

## 2024-02-09 PROCEDURE — 11721 DEBRIDE NAIL 6 OR MORE: CPT | Performed by: PODIATRIST

## 2024-02-09 NOTE — PROGRESS NOTES
Beto Metcalf is here today for nail care. his PCP is Osbaldo Alberto MD last OV was 11/16/2023.     CC:   Painful elongated toenails 1-5 right and left    HPI  Follow-up foot ankle exam.  No open wounds.  History aspirin 81 mg daily.  No recent injury.  No additional pedal complaints.      ROS:  Const: Denies constitutional symptoms  Musculo: Denies symptoms other than stated above  Skin: Denies symptoms other than stated above      Current Outpatient Medications:     tamsulosin (FLOMAX) 0.4 MG capsule, Take 1 capsule by mouth daily, Disp: 90 capsule, Rfl: 1    lisinopril (PRINIVIL;ZESTRIL) 20 MG tablet, Take 1 tablet by mouth daily, Disp: 90 tablet, Rfl: 1    pravastatin (PRAVACHOL) 40 MG tablet, Take 1 tablet by mouth daily (Patient taking differently: Take 1 tablet by mouth daily 40mg one day a week 80mg all the other days), Disp: 90 tablet, Rfl: 1    metoprolol succinate (TOPROL XL) 25 MG extended release tablet, take 1 tablet by mouth once daily, Disp: 90 tablet, Rfl: 1    morphine (MS CONTIN) 30 MG extended release tablet, take 1 tablet by mouth every 12 hours for 30 DAYS START 8/21/23 END 9/19/23, Disp: , Rfl:     oxyCODONE-acetaminophen (PERCOCET) 7.5-325 MG per tablet, take 1 tablet by mouth three times a day if needed for pain for 3...  (REFER TO PRESCRIPTION NOTES)., Disp: , Rfl:     aspirin 81 MG EC tablet, Take 1 tablet by mouth daily, Disp: , Rfl:     levETIRAcetam (KEPPRA) 500 MG tablet, Take 1.5 tablets by mouth 2 times daily, Disp: 180 tablet, Rfl: 1  Allergies   Allergen Reactions    Statins Other (See Comments)     Myalgias        Past Medical History:   Diagnosis Date    Basal cell carcinoma     CAD (coronary artery disease) 11/2019    Cardiomegaly 05/17/2016    Chronic back pain     Chronic back pain     CPAP (continuous positive airway pressure) dependence     Erectile dysfunction     Erectile dysfunction     Esophagitis 09/2011    VITO    H/O cardiomegaly     NORMAL ECHO IN 2016    H/O

## 2024-04-03 DIAGNOSIS — M15.9 PRIMARY OSTEOARTHRITIS INVOLVING MULTIPLE JOINTS: ICD-10-CM

## 2024-04-03 DIAGNOSIS — E78.2 MIXED HYPERLIPIDEMIA: ICD-10-CM

## 2024-04-03 DIAGNOSIS — I25.10 CORONARY ARTERY DISEASE INVOLVING NATIVE CORONARY ARTERY OF NATIVE HEART WITHOUT ANGINA PECTORIS: ICD-10-CM

## 2024-04-03 DIAGNOSIS — I10 ESSENTIAL HYPERTENSION: ICD-10-CM

## 2024-04-03 RX ORDER — LEVETIRACETAM 500 MG/1
500 TABLET ORAL 2 TIMES DAILY
Qty: 180 TABLET | Refills: 1 | Status: SHIPPED | OUTPATIENT
Start: 2024-04-03

## 2024-04-13 DIAGNOSIS — E78.2 MIXED HYPERLIPIDEMIA: ICD-10-CM

## 2024-04-13 DIAGNOSIS — I10 ESSENTIAL HYPERTENSION: ICD-10-CM

## 2024-04-13 DIAGNOSIS — I25.10 CORONARY ARTERY DISEASE INVOLVING NATIVE CORONARY ARTERY OF NATIVE HEART WITHOUT ANGINA PECTORIS: ICD-10-CM

## 2024-04-13 DIAGNOSIS — M15.9 PRIMARY OSTEOARTHRITIS INVOLVING MULTIPLE JOINTS: ICD-10-CM

## 2024-04-16 RX ORDER — METOPROLOL SUCCINATE 25 MG/1
25 TABLET, EXTENDED RELEASE ORAL DAILY
Qty: 90 TABLET | Refills: 1 | Status: SHIPPED | OUTPATIENT
Start: 2024-04-16

## 2024-04-16 NOTE — TELEPHONE ENCOUNTER
Last Appointment:  11/16/2023    Future appts:  Future Appointments   Date Time Provider Department Center   4/19/2024  2:00 PM Sina Canchola DPM Col Podiatry [facility]   5/30/2024  3:30 PM [facility]

## 2024-04-19 ENCOUNTER — PROCEDURE VISIT (OUTPATIENT)
Dept: PODIATRY | Age: 74
End: 2024-04-19
Payer: MEDICARE

## 2024-04-19 DIAGNOSIS — B35.1 TINEA UNGUIUM: Primary | ICD-10-CM

## 2024-04-19 DIAGNOSIS — G60.8 HEREDITARY SENSORY NEUROPATHY: ICD-10-CM

## 2024-04-19 PROCEDURE — 11721 DEBRIDE NAIL 6 OR MORE: CPT | Performed by: PODIATRIST

## 2024-04-19 NOTE — PROGRESS NOTES
Beto SAL Dixon is here today for nail care. his PCP is Osbaldo Alberto MD last OV was 11/16/2023.       CC:   Painful elongated toenails 1-5 right and left    HPI  Follow-up elongated toenails both feet.  No open wounds.  No foot or ankle pain today.  No additional pedal complaints.      ROS:  Const: Denies constitutional symptoms  Musculo: Denies symptoms other than stated above  Skin: Denies symptoms other than stated above      Current Outpatient Medications:     metoprolol succinate (TOPROL XL) 25 MG extended release tablet, take 1 tablet by mouth once daily, Disp: 90 tablet, Rfl: 1    levETIRAcetam (KEPPRA) 500 MG tablet, take 1 tablet by mouth twice a day, Disp: 180 tablet, Rfl: 1    tamsulosin (FLOMAX) 0.4 MG capsule, Take 1 capsule by mouth daily, Disp: 90 capsule, Rfl: 1    lisinopril (PRINIVIL;ZESTRIL) 20 MG tablet, Take 1 tablet by mouth daily, Disp: 90 tablet, Rfl: 1    pravastatin (PRAVACHOL) 40 MG tablet, Take 1 tablet by mouth daily (Patient taking differently: Take 1 tablet by mouth daily 40mg one day a week 80mg all the other days), Disp: 90 tablet, Rfl: 1    morphine (MS CONTIN) 30 MG extended release tablet, take 1 tablet by mouth every 12 hours for 30 DAYS START 8/21/23 END 9/19/23, Disp: , Rfl:     oxyCODONE-acetaminophen (PERCOCET) 7.5-325 MG per tablet, take 1 tablet by mouth three times a day if needed for pain for 3...  (REFER TO PRESCRIPTION NOTES)., Disp: , Rfl:     aspirin 81 MG EC tablet, Take 1 tablet by mouth daily, Disp: , Rfl:   Allergies   Allergen Reactions    Statins Other (See Comments)     Myalgias        Past Medical History:   Diagnosis Date    Basal cell carcinoma     CAD (coronary artery disease) 11/2019    Cardiomegaly 05/17/2016    Chronic back pain     Chronic back pain     CPAP (continuous positive airway pressure) dependence     Erectile dysfunction     Erectile dysfunction     Esophagitis 09/2011    VITO    H/O cardiomegaly     NORMAL ECHO IN 2016    H/O hypogonadism

## 2024-05-12 DIAGNOSIS — E78.2 MIXED HYPERLIPIDEMIA: ICD-10-CM

## 2024-05-12 DIAGNOSIS — M15.9 PRIMARY OSTEOARTHRITIS INVOLVING MULTIPLE JOINTS: ICD-10-CM

## 2024-05-12 DIAGNOSIS — I10 ESSENTIAL HYPERTENSION: ICD-10-CM

## 2024-05-12 DIAGNOSIS — I25.10 CORONARY ARTERY DISEASE INVOLVING NATIVE CORONARY ARTERY OF NATIVE HEART WITHOUT ANGINA PECTORIS: ICD-10-CM

## 2024-05-13 RX ORDER — LISINOPRIL 20 MG/1
20 TABLET ORAL DAILY
Qty: 90 TABLET | Refills: 1 | Status: SHIPPED | OUTPATIENT
Start: 2024-05-13

## 2024-05-13 NOTE — TELEPHONE ENCOUNTER
Last Appointment:  11/16/2023  Future Appointments   Date Time Provider Department Center   5/30/2024  3:30 PM Osbaldo Alberto MD [place] [facility]   6/21/2024  1:30 PM Sina Canchola DPM Col Podiatry [facility]

## 2024-05-14 LAB
CHOLEST SERPL-MCNC: 184 MG/DL
HDLC SERPL-MCNC: 56 MG/DL
LDLC SERPL CALC-MCNC: 94 MG/DL
TRIGL SERPL-MCNC: 168 MG/DL
VLDLC SERPL CALC-MCNC: 34 MG/DL

## 2024-05-30 ENCOUNTER — OFFICE VISIT (OUTPATIENT)
Dept: PRIMARY CARE CLINIC | Age: 74
End: 2024-05-30
Payer: MEDICARE

## 2024-05-30 VITALS
OXYGEN SATURATION: 98 % | DIASTOLIC BLOOD PRESSURE: 88 MMHG | HEART RATE: 73 BPM | BODY MASS INDEX: 37.57 KG/M2 | TEMPERATURE: 97.9 F | WEIGHT: 277 LBS | SYSTOLIC BLOOD PRESSURE: 138 MMHG

## 2024-05-30 DIAGNOSIS — R97.20 ELEVATED PSA: ICD-10-CM

## 2024-05-30 DIAGNOSIS — E78.2 MIXED HYPERLIPIDEMIA: ICD-10-CM

## 2024-05-30 DIAGNOSIS — I69.398 SEIZURE AS LATE EFFECT OF CEREBROVASCULAR ACCIDENT (CVA) (HCC): ICD-10-CM

## 2024-05-30 DIAGNOSIS — I10 ESSENTIAL HYPERTENSION: ICD-10-CM

## 2024-05-30 DIAGNOSIS — R39.14 BENIGN PROSTATIC HYPERPLASIA WITH INCOMPLETE BLADDER EMPTYING: ICD-10-CM

## 2024-05-30 DIAGNOSIS — R73.03 PREDIABETES: ICD-10-CM

## 2024-05-30 DIAGNOSIS — N40.1 BENIGN PROSTATIC HYPERPLASIA WITH INCOMPLETE BLADDER EMPTYING: ICD-10-CM

## 2024-05-30 DIAGNOSIS — G40.909 SEIZURE DISORDER (HCC): ICD-10-CM

## 2024-05-30 DIAGNOSIS — R56.9 SEIZURE AS LATE EFFECT OF CEREBROVASCULAR ACCIDENT (CVA) (HCC): ICD-10-CM

## 2024-05-30 DIAGNOSIS — E66.01 SEVERE OBESITY (BMI 35.0-39.9) WITH COMORBIDITY (HCC): ICD-10-CM

## 2024-05-30 DIAGNOSIS — I25.10 CORONARY ARTERY DISEASE INVOLVING NATIVE CORONARY ARTERY OF NATIVE HEART WITHOUT ANGINA PECTORIS: Primary | ICD-10-CM

## 2024-05-30 LAB — HBA1C MFR BLD: 6.4 %

## 2024-05-30 PROCEDURE — 1123F ACP DISCUSS/DSCN MKR DOCD: CPT | Performed by: INTERNAL MEDICINE

## 2024-05-30 PROCEDURE — G2211 COMPLEX E/M VISIT ADD ON: HCPCS | Performed by: INTERNAL MEDICINE

## 2024-05-30 PROCEDURE — 3079F DIAST BP 80-89 MM HG: CPT | Performed by: INTERNAL MEDICINE

## 2024-05-30 PROCEDURE — 3075F SYST BP GE 130 - 139MM HG: CPT | Performed by: INTERNAL MEDICINE

## 2024-05-30 PROCEDURE — 99214 OFFICE O/P EST MOD 30 MIN: CPT | Performed by: INTERNAL MEDICINE

## 2024-05-30 PROCEDURE — 83036 HEMOGLOBIN GLYCOSYLATED A1C: CPT | Performed by: INTERNAL MEDICINE

## 2024-05-30 RX ORDER — PRAVASTATIN SODIUM 80 MG/1
80 TABLET ORAL DAILY
Qty: 90 TABLET | Refills: 1 | Status: SHIPPED | OUTPATIENT
Start: 2024-05-30

## 2024-05-30 RX ORDER — TAMSULOSIN HYDROCHLORIDE 0.4 MG/1
0.4 CAPSULE ORAL DAILY
Qty: 90 CAPSULE | Refills: 1 | Status: SHIPPED | OUTPATIENT
Start: 2024-05-30

## 2024-05-30 RX ORDER — PRAVASTATIN SODIUM 80 MG/1
TABLET ORAL
COMMUNITY
Start: 2024-05-28

## 2024-05-30 SDOH — ECONOMIC STABILITY: FOOD INSECURITY: WITHIN THE PAST 12 MONTHS, YOU WORRIED THAT YOUR FOOD WOULD RUN OUT BEFORE YOU GOT MONEY TO BUY MORE.: NEVER TRUE

## 2024-05-30 SDOH — ECONOMIC STABILITY: FOOD INSECURITY: WITHIN THE PAST 12 MONTHS, THE FOOD YOU BOUGHT JUST DIDN'T LAST AND YOU DIDN'T HAVE MONEY TO GET MORE.: NEVER TRUE

## 2024-05-30 SDOH — ECONOMIC STABILITY: INCOME INSECURITY: HOW HARD IS IT FOR YOU TO PAY FOR THE VERY BASICS LIKE FOOD, HOUSING, MEDICAL CARE, AND HEATING?: NOT HARD AT ALL

## 2024-05-30 ASSESSMENT — PATIENT HEALTH QUESTIONNAIRE - PHQ9
SUM OF ALL RESPONSES TO PHQ9 QUESTIONS 1 & 2: 0
1. LITTLE INTEREST OR PLEASURE IN DOING THINGS: NOT AT ALL
SUM OF ALL RESPONSES TO PHQ QUESTIONS 1-9: 0
2. FEELING DOWN, DEPRESSED OR HOPELESS: NOT AT ALL

## 2024-05-30 NOTE — PROGRESS NOTES
cyanosis or edema is 1-2+ below the mid tibia bilaterally.  Abdomen: Bowel sounds are normoactive.  Large ventral hernia.  Dullness to percussion over the bladder to the extent of at least 10 to 15 cm above the pubic symphysis.  Musculo: Walks with a limping gait. Upper Extremities: Full ROM bilaterally. Lower Extremities:  Limitation of the lower extremities.  Skin: Dry and warm with no rash.  Neuro: Alert and oriented x3. Mood is normal. Affect is normal. Speech is articulate and fluent.  Reflexes: DTR's are intact, symmetric and 2+ bilaterally.  Psych: Patient's attitude is cooperative. Patient's affect is appropriate. Judgement is realistic. Insight  is appropriate.  Beto was seen today for hypertension.    Diagnoses and all orders for this visit:    Coronary artery disease involving native coronary artery of native heart without angina pectoris  -     pravastatin (PRAVACHOL) 80 MG tablet; Take 1 tablet by mouth daily    Benign prostatic hyperplasia with incomplete bladder emptying  -     tamsulosin (FLOMAX) 0.4 MG capsule; Take 1 capsule by mouth daily    Essential hypertension    Elevated PSA  -     AFL - Patrick Barrera MD, Urology, [place]  -     PSA, Diagnostic; Future    Mixed hyperlipidemia  -     Comprehensive Metabolic Panel; Future    Prediabetes  -     Microalbumin, Ur; Future  -     POCT glycosylated hemoglobin (Hb A1C)    Severe obesity (BMI 35.0-39.9) with comorbidity (HCC)    Seizure as late effect of cerebrovascular accident (CVA) (HCC)    Seizure disorder (HCC)  -     Levetiracetam Level; Future    Other orders  -     Semaglutide-Weight Management (WEGOVY) 0.25 MG/0.5ML SOAJ SC injection; Inject 0.25 mg into the skin every 7 days    The patient is to have lab work today.  I have increased his pravastatin to 80 mg daily.  I have ordered Wegovy.  Hopefully he will be able to obtain this medication.  I explained to him the cost and benefits of that medication and possible side effects including

## 2024-05-31 ENCOUNTER — TELEPHONE (OUTPATIENT)
Dept: PRIMARY CARE CLINIC | Age: 74
End: 2024-05-31

## 2024-05-31 NOTE — TELEPHONE ENCOUNTER
Patient stopped into the office to let you know that he tried to get the Wegovy and his insurance does not cover it.  He said that the pharmacy told him that it is hard to get right now and if they do get it in stock that it is $1600 a month.

## 2024-06-03 DIAGNOSIS — R73.03 PREDIABETES: ICD-10-CM

## 2024-06-03 DIAGNOSIS — G40.909 SEIZURE DISORDER (HCC): ICD-10-CM

## 2024-06-03 DIAGNOSIS — R97.20 ELEVATED PSA: ICD-10-CM

## 2024-06-03 DIAGNOSIS — E78.2 MIXED HYPERLIPIDEMIA: ICD-10-CM

## 2024-06-03 LAB
ALBUMIN: 4.1 G/DL (ref 3.5–5.2)
ALP BLD-CCNC: 77 U/L (ref 40–129)
ALT SERPL-CCNC: 45 U/L (ref 0–40)
ANION GAP SERPL CALCULATED.3IONS-SCNC: 14 MMOL/L (ref 7–16)
AST SERPL-CCNC: 42 U/L (ref 0–39)
BILIRUB SERPL-MCNC: 0.4 MG/DL (ref 0–1.2)
BUN BLDV-MCNC: 10 MG/DL (ref 6–23)
CALCIUM SERPL-MCNC: 9.2 MG/DL (ref 8.6–10.2)
CHLORIDE BLD-SCNC: 100 MMOL/L (ref 98–107)
CO2: 24 MMOL/L (ref 22–29)
CREAT SERPL-MCNC: 0.8 MG/DL (ref 0.7–1.2)
CREATININE URINE: 85.7 MG/DL (ref 40–278)
GFR, ESTIMATED: >90 ML/MIN/1.73M2
GLUCOSE BLD-MCNC: 160 MG/DL (ref 74–99)
MICROALBUMIN/CREAT 24H UR: <12 MG/L (ref 0–19)
MICROALBUMIN/CREAT UR-RTO: NORMAL MCG/MG CREAT (ref 0–30)
POTASSIUM SERPL-SCNC: 4.6 MMOL/L (ref 3.5–5)
PROSTATE SPECIFIC ANTIGEN: 5.08 NG/ML (ref 0–4)
SODIUM BLD-SCNC: 138 MMOL/L (ref 132–146)
TOTAL PROTEIN: 7.7 G/DL (ref 6.4–8.3)

## 2024-06-04 LAB — KEPPRA: 12 UG/ML

## 2024-06-10 ENCOUNTER — TELEPHONE (OUTPATIENT)
Dept: FAMILY MEDICINE CLINIC | Age: 74
End: 2024-06-10

## 2024-06-21 ENCOUNTER — PROCEDURE VISIT (OUTPATIENT)
Dept: PODIATRY | Age: 74
End: 2024-06-21

## 2024-06-21 VITALS — WEIGHT: 277 LBS | HEIGHT: 72 IN | BODY MASS INDEX: 37.52 KG/M2

## 2024-06-21 DIAGNOSIS — B35.1 TINEA UNGUIUM: Primary | ICD-10-CM

## 2024-06-21 DIAGNOSIS — G60.8 HEREDITARY SENSORY NEUROPATHY: ICD-10-CM

## 2024-06-21 NOTE — PROGRESS NOTES
Patient in today for nail care. Patient does not have any complaints of pain at this time. Patient's PCP is Osbaldo Alberto MD and date of last ov 5/30/2024.    Christina Ray MA       CC:   Painful elongated toenails 1-5 right and left    HPI  Follow-up elongated toenails both feet.  No open wounds.  No recent injury or trauma.  Elongated toenails both feet.    ROS:  Const: Denies constitutional symptoms  Musculo: Denies symptoms other than stated above  Skin: Denies symptoms other than stated above      Current Outpatient Medications:     tamsulosin (FLOMAX) 0.4 MG capsule, Take 1 capsule by mouth daily, Disp: 90 capsule, Rfl: 1    pravastatin (PRAVACHOL) 80 MG tablet, Take 1 tablet by mouth daily, Disp: 90 tablet, Rfl: 1    lisinopril (PRINIVIL;ZESTRIL) 20 MG tablet, take 1 tablet by mouth once daily, Disp: 90 tablet, Rfl: 1    metoprolol succinate (TOPROL XL) 25 MG extended release tablet, take 1 tablet by mouth once daily, Disp: 90 tablet, Rfl: 1    levETIRAcetam (KEPPRA) 500 MG tablet, take 1 tablet by mouth twice a day, Disp: 180 tablet, Rfl: 1    morphine (MS CONTIN) 30 MG extended release tablet, take 1 tablet by mouth every 12 hours for 30 DAYS START 8/21/23 END 9/19/23, Disp: , Rfl:     oxyCODONE-acetaminophen (PERCOCET) 7.5-325 MG per tablet, take 1 tablet by mouth three times a day if needed for pain for 3...  (REFER TO PRESCRIPTION NOTES)., Disp: , Rfl:     aspirin 81 MG EC tablet, Take 1 tablet by mouth daily, Disp: , Rfl:   Allergies   Allergen Reactions    Statins Other (See Comments)     Myalgias        Past Medical History:   Diagnosis Date    Basal cell carcinoma     CAD (coronary artery disease) 11/2019    Cardiomegaly 05/17/2016    Chronic back pain     Chronic back pain     CPAP (continuous positive airway pressure) dependence     Erectile dysfunction     Erectile dysfunction     Esophagitis 09/2011    VITO    H/O cardiomegaly     NORMAL ECHO IN 2016    H/O hypogonadism 2012    History of

## 2024-07-24 NOTE — TELEPHONE ENCOUNTER
I have increased the Wegovy to 1 mg weekly.  I sent a prescription to the pharmacy.  If the patient has side effects, he is to let me know.

## 2024-07-24 NOTE — TELEPHONE ENCOUNTER
Pt calling for refill of Wegovy he was able to get it filled after an appeal thru insurance he is tolerating it well and fine with dose being increased as well.

## 2024-07-25 DIAGNOSIS — I25.10 CORONARY ARTERY DISEASE INVOLVING NATIVE CORONARY ARTERY OF NATIVE HEART WITHOUT ANGINA PECTORIS: ICD-10-CM

## 2024-07-25 DIAGNOSIS — M15.9 PRIMARY OSTEOARTHRITIS INVOLVING MULTIPLE JOINTS: ICD-10-CM

## 2024-07-25 DIAGNOSIS — I10 ESSENTIAL HYPERTENSION: ICD-10-CM

## 2024-07-25 DIAGNOSIS — E78.2 MIXED HYPERLIPIDEMIA: ICD-10-CM

## 2024-07-25 RX ORDER — METOPROLOL SUCCINATE 25 MG/1
25 TABLET, EXTENDED RELEASE ORAL DAILY
Qty: 90 TABLET | Refills: 1 | Status: SHIPPED | OUTPATIENT
Start: 2024-07-25

## 2024-07-25 NOTE — TELEPHONE ENCOUNTER
Last Appointment:  5/30/2024  Future Appointments   Date Time Provider Department Center   8/23/2024  9:45 AM Sina Canchola DPM Col Podiatry [facility]   11/25/2024  3:30 PM Osbaldo Alberto MD [place] [facility]

## 2024-08-20 NOTE — TELEPHONE ENCOUNTER
Per Dr. Alberto:    I have increased this dose to 1.7 mg.  I sent the new prescription to the pharmacy

## 2024-08-20 NOTE — TELEPHONE ENCOUNTER
Patient requesting refill on Wegovy.  Patient states this is his 3rd month on the medication.  Patient asking if he is due for an increase in his dose.  Patient reports that he lost a \"little bit of weight\".  Patient did not report any side effects at this time.  Order pending for previous dose.  Please advise.    Last Appointment:  5/30/2024  Future Appointments   Date Time Provider Department Center   8/23/2024  9:45 AM Sina Canchola DPM Col Podiatry [facility]   11/25/2024  3:30 PM Osbaldo Alberto MD [place] [facility] DE[facility]

## 2024-08-23 ENCOUNTER — PROCEDURE VISIT (OUTPATIENT)
Dept: PODIATRY | Age: 74
End: 2024-08-23
Payer: MEDICARE

## 2024-08-23 VITALS — BODY MASS INDEX: 37.52 KG/M2 | WEIGHT: 277 LBS | HEIGHT: 72 IN

## 2024-08-23 DIAGNOSIS — B35.1 TINEA UNGUIUM: Primary | ICD-10-CM

## 2024-08-23 DIAGNOSIS — G60.8 HEREDITARY SENSORY NEUROPATHY: ICD-10-CM

## 2024-08-23 PROCEDURE — 11721 DEBRIDE NAIL 6 OR MORE: CPT | Performed by: PODIATRIST

## 2024-08-23 NOTE — PROGRESS NOTES
Patient in today for nail care. Patient does not have any complaints of pain at this time. Patient's PCP is Osbaldo Alberto MD and date of last ov 5/30/2024.              Sina Canchola DPM     CC:   Painful elongated toenails 1-5 right and left    HPI  Follow-up elongated toenails both feet.  No open wounds.  History aspirin 81 mg daily.  Denies any foot or ankle pain today.  No additional pedal complaints.      ROS:  Const: Denies constitutional symptoms  Musculo: Denies symptoms other than stated above  Skin: Denies symptoms other than stated above      Current Outpatient Medications:     Semaglutide-Weight Management (WEGOVY) 1.7 MG/0.75ML SOAJ SC injection, Inject 1.7 mg into the skin every 7 days, Disp: 0.75 mL, Rfl: 2    metoprolol succinate (TOPROL XL) 25 MG extended release tablet, Take 1 tablet by mouth daily, Disp: 90 tablet, Rfl: 1    tamsulosin (FLOMAX) 0.4 MG capsule, Take 1 capsule by mouth daily, Disp: 90 capsule, Rfl: 1    pravastatin (PRAVACHOL) 80 MG tablet, Take 1 tablet by mouth daily, Disp: 90 tablet, Rfl: 1    lisinopril (PRINIVIL;ZESTRIL) 20 MG tablet, take 1 tablet by mouth once daily, Disp: 90 tablet, Rfl: 1    levETIRAcetam (KEPPRA) 500 MG tablet, take 1 tablet by mouth twice a day, Disp: 180 tablet, Rfl: 1    morphine (MS CONTIN) 30 MG extended release tablet, take 1 tablet by mouth every 12 hours for 30 DAYS START 8/21/23 END 9/19/23, Disp: , Rfl:     oxyCODONE-acetaminophen (PERCOCET) 7.5-325 MG per tablet, take 1 tablet by mouth three times a day if needed for pain for 3...  (REFER TO PRESCRIPTION NOTES)., Disp: , Rfl:     aspirin 81 MG EC tablet, Take 1 tablet by mouth daily, Disp: , Rfl:   Allergies   Allergen Reactions    Statins Other (See Comments)     Myalgias        Past Medical History:   Diagnosis Date    Basal cell carcinoma     CAD (coronary artery disease) 11/2019    Cardiomegaly 05/17/2016    Chronic back pain     Chronic back pain     CPAP (continuous positive airway

## 2024-09-13 DIAGNOSIS — E78.2 MIXED HYPERLIPIDEMIA: ICD-10-CM

## 2024-09-13 DIAGNOSIS — M15.9 PRIMARY OSTEOARTHRITIS INVOLVING MULTIPLE JOINTS: ICD-10-CM

## 2024-09-13 DIAGNOSIS — I10 ESSENTIAL HYPERTENSION: ICD-10-CM

## 2024-09-13 DIAGNOSIS — I25.10 CORONARY ARTERY DISEASE INVOLVING NATIVE CORONARY ARTERY OF NATIVE HEART WITHOUT ANGINA PECTORIS: ICD-10-CM

## 2024-09-13 RX ORDER — LISINOPRIL 20 MG/1
20 TABLET ORAL DAILY
Qty: 90 TABLET | Refills: 1 | Status: SHIPPED | OUTPATIENT
Start: 2024-09-13

## 2024-10-17 RX ORDER — SEMAGLUTIDE 1.7 MG/.75ML
INJECTION, SOLUTION SUBCUTANEOUS
Qty: 3 ML | Refills: 1 | Status: SHIPPED | OUTPATIENT
Start: 2024-10-17

## 2024-10-25 DIAGNOSIS — I25.10 CORONARY ARTERY DISEASE INVOLVING NATIVE CORONARY ARTERY OF NATIVE HEART WITHOUT ANGINA PECTORIS: ICD-10-CM

## 2024-10-25 DIAGNOSIS — E78.2 MIXED HYPERLIPIDEMIA: ICD-10-CM

## 2024-10-25 DIAGNOSIS — I10 ESSENTIAL HYPERTENSION: ICD-10-CM

## 2024-10-25 DIAGNOSIS — M15.0 PRIMARY OSTEOARTHRITIS INVOLVING MULTIPLE JOINTS: ICD-10-CM

## 2024-10-25 RX ORDER — METOPROLOL SUCCINATE 25 MG/1
25 TABLET, EXTENDED RELEASE ORAL DAILY
Qty: 90 TABLET | Refills: 1 | Status: SHIPPED | OUTPATIENT
Start: 2024-10-25

## 2024-10-25 NOTE — TELEPHONE ENCOUNTER
Last Appointment:  5/30/2024  Future Appointments   Date Time Provider Department Center   11/4/2024  3:15 PM Sina Canchola, [facility]   11/25/2024  3:30 PM Osbaldo Alberto MD [place] [facility] E[facility] DEP   11/25/2024  3:45 PM Osbaldo Alberto MD [place] [facility]

## 2024-11-04 ENCOUNTER — PROCEDURE VISIT (OUTPATIENT)
Dept: PODIATRY | Age: 74
End: 2024-11-04
Payer: MEDICARE

## 2024-11-04 ENCOUNTER — TELEPHONE (OUTPATIENT)
Dept: PRIMARY CARE CLINIC | Age: 74
End: 2024-11-04

## 2024-11-04 VITALS — WEIGHT: 277 LBS | HEIGHT: 72 IN | BODY MASS INDEX: 37.52 KG/M2

## 2024-11-04 DIAGNOSIS — B35.1 TINEA UNGUIUM: ICD-10-CM

## 2024-11-04 DIAGNOSIS — G60.8 HEREDITARY SENSORY NEUROPATHY: Primary | ICD-10-CM

## 2024-11-04 PROCEDURE — 11721 DEBRIDE NAIL 6 OR MORE: CPT | Performed by: PODIATRIST

## 2024-11-04 NOTE — PROGRESS NOTES
follow-up 2 months        Return in about 2 months (around 1/4/2025).     Seen By:  Sina Canchola DPM      Document was created using voice recognition software.  Note was reviewed however may contain grammatical errors.

## 2024-11-04 NOTE — TELEPHONE ENCOUNTER
Beto is asking if you can increase the dose of his wegovy to 2.4 MG. He has been on the 1.7 MG and his weight loss has hit a plateau. He would like the wegovy 2.4 MG sent to [facility] in [place] if you are willing.

## 2024-11-05 DIAGNOSIS — I25.10 CORONARY ARTERY DISEASE INVOLVING NATIVE CORONARY ARTERY OF NATIVE HEART WITHOUT ANGINA PECTORIS: ICD-10-CM

## 2024-11-05 DIAGNOSIS — E78.2 MIXED HYPERLIPIDEMIA: ICD-10-CM

## 2024-11-05 DIAGNOSIS — I10 ESSENTIAL HYPERTENSION: ICD-10-CM

## 2024-11-05 DIAGNOSIS — M15.0 PRIMARY OSTEOARTHRITIS INVOLVING MULTIPLE JOINTS: ICD-10-CM

## 2024-11-05 RX ORDER — LEVETIRACETAM 500 MG/1
500 TABLET ORAL 2 TIMES DAILY
Qty: 180 TABLET | Refills: 1 | Status: SHIPPED | OUTPATIENT
Start: 2024-11-05

## 2024-11-05 NOTE — TELEPHONE ENCOUNTER
Name of Medication(s) Requested:  Requested Prescriptions     Pending Prescriptions Disp Refills    levETIRAcetam (KEPPRA) 500 MG tablet 180 tablet 1     Sig: Take 1 tablet by mouth 2 times daily       Medication is on current medication list Yes    Dosage and directions were verified? Yes    Quantity verified: 90 day supply     Pharmacy Verified?  Yes    Last Appointment:  5/30/2024    Future appts:  Future Appointments   Date Time Provider Department Center   11/25/2024  3:30 PM Osbaldo Alberto MD [place] [facility]   11/25/2024  3:45 PM Osbaldo Alberto MD [place] [facility]   1/17/2025  1:15 PM Sina Canchola DPM Col Podiatry [facility]        (If no appt send self scheduling link. .REFILLAPPT)  Scheduling request sent?     [] Yes  [x] No    Does patient need updated?  [] Yes  [x] No

## 2024-11-25 ENCOUNTER — OFFICE VISIT (OUTPATIENT)
Dept: PRIMARY CARE CLINIC | Age: 74
End: 2024-11-25
Payer: MEDICARE

## 2024-11-25 ENCOUNTER — OFFICE VISIT (OUTPATIENT)
Dept: PRIMARY CARE CLINIC | Age: 74
End: 2024-11-25

## 2024-11-25 VITALS
DIASTOLIC BLOOD PRESSURE: 80 MMHG | RESPIRATION RATE: 16 BRPM | HEIGHT: 72 IN | HEART RATE: 95 BPM | TEMPERATURE: 97.2 F | BODY MASS INDEX: 36.03 KG/M2 | WEIGHT: 266 LBS | SYSTOLIC BLOOD PRESSURE: 120 MMHG | OXYGEN SATURATION: 97 %

## 2024-11-25 VITALS
DIASTOLIC BLOOD PRESSURE: 80 MMHG | WEIGHT: 266 LBS | OXYGEN SATURATION: 97 % | SYSTOLIC BLOOD PRESSURE: 120 MMHG | RESPIRATION RATE: 16 BRPM | HEART RATE: 95 BPM | TEMPERATURE: 97.2 F | BODY MASS INDEX: 36.03 KG/M2 | HEIGHT: 72 IN

## 2024-11-25 DIAGNOSIS — I10 ESSENTIAL HYPERTENSION: ICD-10-CM

## 2024-11-25 DIAGNOSIS — N40.1 BENIGN PROSTATIC HYPERPLASIA WITH INCOMPLETE BLADDER EMPTYING: ICD-10-CM

## 2024-11-25 DIAGNOSIS — R73.03 PREDIABETES: ICD-10-CM

## 2024-11-25 DIAGNOSIS — M15.0 PRIMARY OSTEOARTHRITIS INVOLVING MULTIPLE JOINTS: ICD-10-CM

## 2024-11-25 DIAGNOSIS — I25.10 CORONARY ARTERY DISEASE INVOLVING NATIVE CORONARY ARTERY OF NATIVE HEART WITHOUT ANGINA PECTORIS: ICD-10-CM

## 2024-11-25 DIAGNOSIS — Z00.00 ENCOUNTER FOR SUBSEQUENT ANNUAL WELLNESS VISIT (AWV) IN MEDICARE PATIENT: ICD-10-CM

## 2024-11-25 DIAGNOSIS — E78.2 MIXED HYPERLIPIDEMIA: ICD-10-CM

## 2024-11-25 DIAGNOSIS — E66.9 OBESITY (BMI 35.0-39.9 WITHOUT COMORBIDITY): ICD-10-CM

## 2024-11-25 DIAGNOSIS — R39.14 BENIGN PROSTATIC HYPERPLASIA WITH INCOMPLETE BLADDER EMPTYING: ICD-10-CM

## 2024-11-25 DIAGNOSIS — Z00.00 MEDICARE ANNUAL WELLNESS VISIT, SUBSEQUENT: Primary | ICD-10-CM

## 2024-11-25 DIAGNOSIS — R73.03 PREDIABETES: Primary | ICD-10-CM

## 2024-11-25 LAB
ALBUMIN: 4.1 G/DL (ref 3.5–5.2)
ALP BLD-CCNC: 69 U/L (ref 40–129)
ALT SERPL-CCNC: 23 U/L (ref 0–40)
ANION GAP SERPL CALCULATED.3IONS-SCNC: 21 MMOL/L (ref 7–16)
AST SERPL-CCNC: 38 U/L (ref 0–39)
BILIRUB SERPL-MCNC: 0.5 MG/DL (ref 0–1.2)
BUN BLDV-MCNC: 11 MG/DL (ref 6–23)
CALCIUM SERPL-MCNC: 9.6 MG/DL (ref 8.6–10.2)
CHLORIDE BLD-SCNC: 100 MMOL/L (ref 98–107)
CHOLESTEROL, TOTAL: 146 MG/DL
CO2: 19 MMOL/L (ref 22–29)
CREAT SERPL-MCNC: 0.8 MG/DL (ref 0.7–1.2)
GFR, ESTIMATED: >90 ML/MIN/1.73M2
GLUCOSE BLD-MCNC: 104 MG/DL (ref 74–99)
HBA1C MFR BLD: 5.5 %
HDLC SERPL-MCNC: 50 MG/DL
LDL CHOLESTEROL: 60 MG/DL
POTASSIUM SERPL-SCNC: 4 MMOL/L (ref 3.5–5)
SODIUM BLD-SCNC: 140 MMOL/L (ref 132–146)
TOTAL PROTEIN: 7.7 G/DL (ref 6.4–8.3)
TRIGL SERPL-MCNC: 180 MG/DL
VLDLC SERPL CALC-MCNC: 36 MG/DL

## 2024-11-25 PROCEDURE — 1159F MED LIST DOCD IN RCRD: CPT | Performed by: INTERNAL MEDICINE

## 2024-11-25 PROCEDURE — 3079F DIAST BP 80-89 MM HG: CPT | Performed by: INTERNAL MEDICINE

## 2024-11-25 PROCEDURE — 1123F ACP DISCUSS/DSCN MKR DOCD: CPT | Performed by: INTERNAL MEDICINE

## 2024-11-25 PROCEDURE — 3074F SYST BP LT 130 MM HG: CPT | Performed by: INTERNAL MEDICINE

## 2024-11-25 PROCEDURE — G0439 PPPS, SUBSEQ VISIT: HCPCS | Performed by: INTERNAL MEDICINE

## 2024-11-25 RX ORDER — PRAVASTATIN SODIUM 80 MG/1
80 TABLET ORAL DAILY
Qty: 90 TABLET | Refills: 1 | Status: SHIPPED | OUTPATIENT
Start: 2024-11-25

## 2024-11-25 RX ORDER — TAMSULOSIN HYDROCHLORIDE 0.4 MG/1
0.4 CAPSULE ORAL DAILY
Qty: 90 CAPSULE | Refills: 1 | Status: SHIPPED | OUTPATIENT
Start: 2024-11-25

## 2024-11-25 ASSESSMENT — PATIENT HEALTH QUESTIONNAIRE - PHQ9
SUM OF ALL RESPONSES TO PHQ QUESTIONS 1-9: 0
SUM OF ALL RESPONSES TO PHQ9 QUESTIONS 1 & 2: 0
SUM OF ALL RESPONSES TO PHQ QUESTIONS 1-9: 0
2. FEELING DOWN, DEPRESSED OR HOPELESS: NOT AT ALL
1. LITTLE INTEREST OR PLEASURE IN DOING THINGS: NOT AT ALL

## 2024-11-25 NOTE — PROGRESS NOTES
Medicare Annual Wellness Visit    Beto Metcalf is here for Medicare AWV    Assessment & Plan     Recommendations for Preventive Services Due: see orders and patient instructions/AVS.  Recommended screening schedule for the next 5-10 years is provided to the patient in written form: see Patient Instructions/AVS.     No follow-ups on file.     Subjective       Patient's complete Health Risk Assessment and screening values have been reviewed and are found in Flowsheets. The following problems were reviewed today and where indicated follow up appointments were made and/or referrals ordered.    Positive Risk Factor Screenings with Interventions:          Controlled Medication Review:    Today's Pain Level: No data recorded   Opioid Risk: (Low risk score <55) Opioid risk score: 30    Patient is low risk for opioid use disorder or overdose.    Last PDMP [place] as Reviewed:  Review User Review Instant Review Result   LYLA HERRON 8/26/2022  6:01 PM     Reviewed PDMP [1]          Inactivity:  On average, how many days per week do you engage in moderate to strenuous exercise (like a brisk walk)?: 1 day (!) Abnormal  On average, how many minutes do you engage in exercise at this level?: 10 min    Interventions:  Patient advised to follow up in the office for further evaluation and treatment     Abnormal BMI (obese):  Body mass index is 36.08 kg/m². (!) Abnormal    Interventions:  Patient advised to follow-up in this office for further evaluation and treatment        Dentist Screen:  Have you seen the dentist within the past year?: (!) No    Intervention:  Not applicable - dentures                      Objective   Vitals:    11/25/24 1518   BP: 120/80   Pulse: 95   Resp: 16   Temp: 97.2 °F (36.2 °C)   SpO2: 97%   Weight: 120.7 kg (266 lb)   Height: 1.829 m (6')      Body mass index is 36.08 kg/m².                    Allergies   Allergen Reactions    Statins Other (See Comments)     Myalgias      Prior to Visit Medications

## 2024-11-25 NOTE — PROGRESS NOTES
oriented x3. Mood is normal. Affect is normal. Speech is articulate and fluent.  Reflexes: DTR's are intact, symmetric and 2+ bilaterally.  Psych: Patient's attitude is cooperative. Patient's affect is appropriate. Judgement is realistic. Insight  is appropriate.  Beto \"Milagros\" was seen today for hypertension and cholesterol problem.    Diagnoses and all orders for this visit:    Prediabetes  -     POCT glycosylated hemoglobin (Hb A1C)  -     Comprehensive Metabolic Panel; Future  -     Lipid Panel; Future    Coronary artery disease involving native coronary artery of native heart without angina pectoris  -     pravastatin (PRAVACHOL) 80 MG tablet; Take 1 tablet by mouth daily  -     Comprehensive Metabolic Panel; Future  -     Lipid Panel; Future    Primary osteoarthritis involving multiple joints  -     Comprehensive Metabolic Panel; Future  -     Lipid Panel; Future    Essential hypertension  -     Comprehensive Metabolic Panel; Future  -     Lipid Panel; Future    Mixed hyperlipidemia  -     Comprehensive Metabolic Panel; Future  -     Lipid Panel; Future    Benign prostatic hyperplasia with incomplete bladder emptying  -     tamsulosin (FLOMAX) 0.4 MG capsule; Take 1 capsule by mouth daily    Obesity (BMI 35.0-39.9 without comorbidity)  -     Comprehensive Metabolic Panel; Future  -     Lipid Panel; Future    Patient is given the name of tirzepatide to see if this can be approved.  I think this would be a much better agent for this gentleman for his weight loss.  CMP will be done today.  Patient will also have a lipid profile.  He is to be seen back in 6 months..

## 2024-11-25 NOTE — PATIENT INSTRUCTIONS
assessments and screenings as appropriate.    After reviewing your medical record and screening and assessments performed today your provider may have ordered immunizations, labs, imaging, and/or referrals for you.  A list of these orders (if applicable) as well as your Preventive Care list are included within your After Visit Summary for your review.    Other Preventive Recommendations:    A preventive eye exam performed by an eye specialist is recommended every 1-2 years to screen for glaucoma; cataracts, macular degeneration, and other eye disorders.  A preventive dental visit is recommended every 6 months.  Try to get at least 150 minutes of exercise per week or 10,000 steps per day on a pedometer .  Order or download the FREE \"Exercise & Physical Activity: Your Everyday Guide\" from [employer]. Call 1-640.888.2309 or search [employer] online.  You need 6639-4352 mg of calcium and 0641-3216 IU of vitamin D per day. It is possible to meet your calcium requirement with diet alone, but a vitamin D supplement is usually necessary to meet this goal.  When exposed to the sun, use a sunscreen that protects against both UVA and UVB radiation with an SPF of 30 or greater. Reapply every 2 to 3 hours or after sweating, drying off with a towel, or swimming.  Always wear a seat belt when traveling in a car. Always wear a helmet when riding a bicycle or motorcycle.

## 2025-01-17 ENCOUNTER — PROCEDURE VISIT (OUTPATIENT)
Dept: PODIATRY | Age: 75
End: 2025-01-17

## 2025-01-17 VITALS
WEIGHT: 266 LBS | HEART RATE: 72 BPM | OXYGEN SATURATION: 96 % | TEMPERATURE: 98 F | BODY MASS INDEX: 36.03 KG/M2 | HEIGHT: 72 IN

## 2025-01-17 DIAGNOSIS — G60.8 HEREDITARY SENSORY NEUROPATHY: ICD-10-CM

## 2025-01-17 DIAGNOSIS — B35.1 TINEA UNGUIUM: Primary | ICD-10-CM

## 2025-01-17 NOTE — PROGRESS NOTES
Patient here for Tinea Unguium. Patient has no new concerns at this time.  Osbaldo Alberto MD   Electronically signed by Dona Ortiz LPN on 1/17/2025 at 1:07 PM          CC:   Painful elongated toenails 1-5 right and left    HPI  Follow-up elongated toenails both feet.  No open wounds no recent injury or trauma.  He denies any foot or ankle issues today.  Pleased with progression.      ROS:  Const: Denies constitutional symptoms  Musculo: Denies symptoms other than stated above  Skin: Denies symptoms other than stated above      Current Outpatient Medications:     pravastatin (PRAVACHOL) 80 MG tablet, Take 1 tablet by mouth daily, Disp: 90 tablet, Rfl: 1    tamsulosin (FLOMAX) 0.4 MG capsule, Take 1 capsule by mouth daily, Disp: 90 capsule, Rfl: 1    levETIRAcetam (KEPPRA) 500 MG tablet, Take 1 tablet by mouth 2 times daily, Disp: 180 tablet, Rfl: 1    Semaglutide-Weight Management (WEGOVY) 2.4 MG/0.75ML SOAJ SC injection, Inject 2.4 mg into the skin every 7 days, Disp: 0.75 mL, Rfl: 4    metoprolol succinate (TOPROL XL) 25 MG extended release tablet, TAKE 1 TABLET BY MOUTH DAILY, Disp: 90 tablet, Rfl: 1    lisinopril (PRINIVIL;ZESTRIL) 20 MG tablet, TAKE 1 TABLET BY MOUTH DAILY, Disp: 90 tablet, Rfl: 1    morphine (MS CONTIN) 30 MG extended release tablet, take 1 tablet by mouth every 12 hours for 30 DAYS START 8/21/23 END 9/19/23, Disp: , Rfl:     oxyCODONE-acetaminophen (PERCOCET) 7.5-325 MG per tablet, take 1 tablet by mouth three times a day if needed for pain for 3...  (REFER TO PRESCRIPTION NOTES)., Disp: , Rfl:     aspirin 81 MG EC tablet, Take 1 tablet by mouth daily, Disp: , Rfl:   Allergies   Allergen Reactions    Statins Other (See Comments)     Myalgias        Past Medical History:   Diagnosis Date    Basal cell carcinoma     CAD (coronary artery disease) 11/2019    Cardiomegaly 05/17/2016    Chronic back pain     Chronic back pain     CPAP (continuous positive airway pressure) dependence

## 2025-02-05 DIAGNOSIS — I25.10 CORONARY ARTERY DISEASE INVOLVING NATIVE CORONARY ARTERY OF NATIVE HEART WITHOUT ANGINA PECTORIS: ICD-10-CM

## 2025-02-05 DIAGNOSIS — E78.2 MIXED HYPERLIPIDEMIA: ICD-10-CM

## 2025-02-05 DIAGNOSIS — M15.0 PRIMARY OSTEOARTHRITIS INVOLVING MULTIPLE JOINTS: ICD-10-CM

## 2025-02-05 DIAGNOSIS — I10 ESSENTIAL HYPERTENSION: ICD-10-CM

## 2025-02-05 RX ORDER — LEVETIRACETAM 500 MG/1
500 TABLET ORAL 2 TIMES DAILY
Qty: 180 TABLET | Refills: 1 | Status: SHIPPED | OUTPATIENT
Start: 2025-02-05

## 2025-02-10 ENCOUNTER — OFFICE VISIT (OUTPATIENT)
Dept: NEUROSURGERY | Age: 75
End: 2025-02-10
Payer: MEDICARE

## 2025-02-10 ENCOUNTER — TELEPHONE (OUTPATIENT)
Dept: PRIMARY CARE CLINIC | Age: 75
End: 2025-02-10

## 2025-02-10 VITALS — WEIGHT: 258 LBS | BODY MASS INDEX: 36.94 KG/M2 | HEIGHT: 70 IN | RESPIRATION RATE: 18 BRPM

## 2025-02-10 DIAGNOSIS — Z45.42 BATTERY END OF LIFE OF SPINAL CORD STIMULATOR: Primary | ICD-10-CM

## 2025-02-10 PROCEDURE — 99214 OFFICE O/P EST MOD 30 MIN: CPT | Performed by: NEUROLOGICAL SURGERY

## 2025-02-10 ASSESSMENT — ENCOUNTER SYMPTOMS
EYES NEGATIVE: 1
RESPIRATORY NEGATIVE: 1
GASTROINTESTINAL NEGATIVE: 1
ALLERGIC/IMMUNOLOGIC NEGATIVE: 1
BACK PAIN: 1

## 2025-02-10 NOTE — TELEPHONE ENCOUNTER
Patient states that he needs a new handicap placard. His will  in March. He would like it for 5 years

## 2025-02-10 NOTE — PROGRESS NOTES
Beto Metcalf (:  1950) is a 75 y.o. male,Established patient, here for evaluation of the following chief complaint(s):  Back Pain (Patient is here to discuss having battery replaced in stimulator   was put in  and the [employer]bot rep. Told patient to make appointment to have it replaced and then call her )         Assessment & Plan  Battery end of life of spinal cord stimulator   New, not at goal (unstable), changes made today: will schedule for spinal cord stimulator battery replacement           No follow-ups on file.     75 year old male who has a [employer] spinal cord stimulator in place.  His battery has reached end of life.  I will schedule him fr a new battery.  Risks, benefits and alternatives have been discussed and he wishes to proceed      Subjective   HPI  75 year old male who presents with worsening back pain.  He had a spinal cord stimulator placed in .  His battery has reached end of life and now he has worsening back pain.    Review of Systems   Constitutional: Negative.    HENT: Negative.     Eyes: Negative.    Respiratory: Negative.     Cardiovascular: Negative.    Gastrointestinal: Negative.    Musculoskeletal:  Positive for arthralgias and back pain.   Skin: Negative.    Allergic/Immunologic: Negative.    Neurological: Negative.    Hematological:  Bruises/bleeds easily.   Psychiatric/Behavioral: Negative.            Objective   Physical Exam  Vitals reviewed.   Constitutional:       General: He is not in acute distress.     Appearance: Normal appearance. He is normal weight. He is not ill-appearing, toxic-appearing or diaphoretic.   HENT:      Head: Normocephalic and atraumatic.      Nose: Nose normal. No congestion or rhinorrhea.      Mouth/Throat:      Mouth: Mucous membranes are moist.      Pharynx: Oropharynx is clear. No oropharyngeal exudate or posterior oropharyngeal erythema.   Eyes:      General: No visual field deficit or scleral icterus.        Right eye: No

## 2025-02-11 ENCOUNTER — PREP FOR PROCEDURE (OUTPATIENT)
Dept: NEUROSURGERY | Age: 75
End: 2025-02-11

## 2025-02-11 DIAGNOSIS — Z45.42 BATTERY END OF LIFE OF SPINAL CORD STIMULATOR: ICD-10-CM

## 2025-02-12 RX ORDER — SODIUM CHLORIDE 9 MG/ML
INJECTION, SOLUTION INTRAVENOUS PRN
Status: CANCELLED | OUTPATIENT
Start: 2025-02-12

## 2025-02-12 RX ORDER — ACETAMINOPHEN 325 MG/1
1000 TABLET ORAL ONCE
Status: CANCELLED | OUTPATIENT
Start: 2025-02-12 | End: 2025-02-12

## 2025-02-12 RX ORDER — SODIUM CHLORIDE 0.9 % (FLUSH) 0.9 %
5-40 SYRINGE (ML) INJECTION PRN
Status: CANCELLED | OUTPATIENT
Start: 2025-02-12

## 2025-02-12 RX ORDER — SODIUM CHLORIDE 9 MG/ML
INJECTION, SOLUTION INTRAVENOUS CONTINUOUS
Status: CANCELLED | OUTPATIENT
Start: 2025-02-12

## 2025-02-12 RX ORDER — SODIUM CHLORIDE 0.9 % (FLUSH) 0.9 %
5-40 SYRINGE (ML) INJECTION EVERY 12 HOURS SCHEDULED
Status: CANCELLED | OUTPATIENT
Start: 2025-02-12

## 2025-02-25 NOTE — PROGRESS NOTES
[facility]   PRE-ADMISSION TESTING GENERAL INSTRUCTIONS  PAT Phone Number: [phone]      GENERAL INSTRUCTIONS:    [x] Antibacterial Soap Shower Night before AND the Morning of procedure.  [x] Do not wear makeup, lotions, powders, deodorant the morning of surgery.  [x] No solid food after midnight. You may have SIPS of clear liquids up until 2 hours before your arrival time to the hospital.   [x] You may brush your teeth, gargle, but do not swallow water.   [x] No tobacco products, illegal drugs, or alcohol within 24 hours of your surgery.  [x] Jewelry or valuables should not be brought to the hospital. All body and/or tongue piercing's must be removed prior to arriving to hospital. No contact lens or hair pins.   [x] Arrange transportation with a responsible adult  to and from the hospital. Arrange for someone to be with you for the remainder of the day and for 24 hours after your procedure due to having had anesthesia.          -Who will be your  for transportation? Hilaria        -Who will be staying with you for 24 hrs after your procedure? Hilaria  [x] Bring insurance card and photo ID.  [x] You may bring copy of living will or healthcare power of  papers to be placed in your electronic record.  [x] Transfusion (Green) Bracelet: Please bring with you to hospital, day of surgery.     PARKING INSTRUCTIONS:     [x] ARRIVAL DATE & TIME: 3/5/25 at 0730  [x] Times are subject to change. We will contact you the business day before surgery if that were to occur.  [x] Enter into the [facility] Entrance. Two people may accompany you. Masks are not required.  [x] Parking Lot \"I\" is where you will park. It is located on the corner of [address] and [address]. The entrance is on [address].   Only one vehicle - per patient, is permitted in parking lot.   Upon entering the parking lot, a voucher ticket will print.    EDUCATION INSTRUCTIONS:             [x]

## 2025-02-28 ENCOUNTER — HOSPITAL ENCOUNTER (OUTPATIENT)
Dept: PREADMISSION TESTING | Age: 75
Discharge: HOME | End: 2025-02-28
Payer: MEDICARE

## 2025-02-28 ENCOUNTER — HOSPITAL ENCOUNTER (OUTPATIENT)
Dept: GENERAL RADIOLOGY | Age: 75
End: 2025-02-28
Payer: MEDICARE

## 2025-02-28 VITALS
DIASTOLIC BLOOD PRESSURE: 86 MMHG | WEIGHT: 260 LBS | OXYGEN SATURATION: 95 % | RESPIRATION RATE: 20 BRPM | HEIGHT: 72 IN | SYSTOLIC BLOOD PRESSURE: 139 MMHG | TEMPERATURE: 97.6 F | BODY MASS INDEX: 35.21 KG/M2 | HEART RATE: 79 BPM

## 2025-02-28 DIAGNOSIS — Z45.42 BATTERY END OF LIFE OF SPINAL CORD STIMULATOR: ICD-10-CM

## 2025-02-28 LAB
ABO + RH BLD: NORMAL
ANION GAP SERPL CALCULATED.3IONS-SCNC: 11 MMOL/L (ref 7–16)
ARM BAND NUMBER: NORMAL
BASOPHILS # BLD: 0.08 K/UL (ref 0–0.2)
BASOPHILS NFR BLD: 1 % (ref 0–2)
BILIRUB UR QL STRIP: NEGATIVE
BLOOD BANK SAMPLE EXPIRATION: NORMAL
BLOOD GROUP ANTIBODIES SERPL: NEGATIVE
BUN SERPL-MCNC: 11 MG/DL (ref 6–23)
CALCIUM SERPL-MCNC: 9.1 MG/DL (ref 8.6–10.2)
CHLORIDE SERPL-SCNC: 102 MMOL/L (ref 98–107)
CLARITY UR: CLEAR
CO2 SERPL-SCNC: 23 MMOL/L (ref 22–29)
COLOR UR: YELLOW
COMMENT: NORMAL
CREAT SERPL-MCNC: 0.8 MG/DL (ref 0.7–1.2)
EKG ATRIAL RATE: 76 BPM
EKG P AXIS: 13 DEGREES
EKG P-R INTERVAL: 228 MS
EKG Q-T INTERVAL: 416 MS
EKG QRS DURATION: 154 MS
EKG QTC CALCULATION (BAZETT): 468 MS
EKG R AXIS: -69 DEGREES
EKG T AXIS: 49 DEGREES
EKG VENTRICULAR RATE: 76 BPM
EOSINOPHIL # BLD: 0.48 K/UL (ref 0.05–0.5)
EOSINOPHILS RELATIVE PERCENT: 6 % (ref 0–6)
ERYTHROCYTE [DISTWIDTH] IN BLOOD BY AUTOMATED COUNT: 12.3 % (ref 11.5–15)
GFR, ESTIMATED: >90 ML/MIN/1.73M2
GLUCOSE SERPL-MCNC: 132 MG/DL (ref 74–99)
GLUCOSE UR STRIP-MCNC: NEGATIVE MG/DL
HCT VFR BLD AUTO: 46.6 % (ref 37–54)
HGB BLD-MCNC: 16 G/DL (ref 12.5–16.5)
HGB UR QL STRIP.AUTO: NEGATIVE
IMM GRANULOCYTES # BLD AUTO: 0.11 K/UL (ref 0–0.58)
IMM GRANULOCYTES NFR BLD: 1 % (ref 0–5)
INR PPP: 1.1
KETONES UR STRIP-MCNC: NEGATIVE MG/DL
LEUKOCYTE ESTERASE UR QL STRIP: NEGATIVE
LYMPHOCYTES NFR BLD: 1.31 K/UL (ref 1.5–4)
LYMPHOCYTES RELATIVE PERCENT: 17 % (ref 20–42)
MCH RBC QN AUTO: 33.8 PG (ref 26–35)
MCHC RBC AUTO-ENTMCNC: 34.3 G/DL (ref 32–34.5)
MCV RBC AUTO: 98.3 FL (ref 80–99.9)
MONOCYTES NFR BLD: 0.68 K/UL (ref 0.1–0.95)
MONOCYTES NFR BLD: 9 % (ref 2–12)
NEUTROPHILS NFR BLD: 65 % (ref 43–80)
NEUTS SEG NFR BLD: 4.93 K/UL (ref 1.8–7.3)
NITRITE UR QL STRIP: NEGATIVE
PH UR STRIP: 6 [PH] (ref 5–8)
PLATELET # BLD AUTO: 291 K/UL (ref 130–450)
PMV BLD AUTO: 10.8 FL (ref 7–12)
POTASSIUM SERPL-SCNC: 3.9 MMOL/L (ref 3.5–5)
PROT UR STRIP-MCNC: NEGATIVE MG/DL
PROTHROMBIN TIME: 11.4 SEC (ref 9.3–12.4)
RBC # BLD AUTO: 4.74 M/UL (ref 3.8–5.8)
SODIUM SERPL-SCNC: 136 MMOL/L (ref 132–146)
SP GR UR STRIP: 1.01 (ref 1–1.03)
UROBILINOGEN UR STRIP-ACNC: 0.2 EU/DL (ref 0–1)
WBC OTHER # BLD: 7.6 K/UL (ref 4.5–11.5)

## 2025-02-28 PROCEDURE — 85610 PROTHROMBIN TIME: CPT

## 2025-02-28 PROCEDURE — 36415 COLL VENOUS BLD VENIPUNCTURE: CPT

## 2025-02-28 PROCEDURE — 86901 BLOOD TYPING SEROLOGIC RH(D): CPT

## 2025-02-28 PROCEDURE — 71046 X-RAY EXAM CHEST 2 VIEWS: CPT

## 2025-02-28 PROCEDURE — 80048 BASIC METABOLIC PNL TOTAL CA: CPT

## 2025-02-28 PROCEDURE — 81003 URINALYSIS AUTO W/O SCOPE: CPT

## 2025-02-28 PROCEDURE — 86900 BLOOD TYPING SEROLOGIC ABO: CPT

## 2025-02-28 PROCEDURE — 87086 URINE CULTURE/COLONY COUNT: CPT

## 2025-02-28 PROCEDURE — 85025 COMPLETE CBC W/AUTO DIFF WBC: CPT

## 2025-02-28 PROCEDURE — 86850 RBC ANTIBODY SCREEN: CPT

## 2025-03-01 LAB
MICROORGANISM SPEC CULT: ABNORMAL
SERVICE CMNT-IMP: ABNORMAL
SPECIMEN DESCRIPTION: ABNORMAL

## 2025-03-05 ENCOUNTER — HOSPITAL ENCOUNTER (OUTPATIENT)
Age: 75
Setting detail: OUTPATIENT SURGERY
Discharge: HOME | End: 2025-03-05
Attending: NEUROLOGICAL SURGERY | Admitting: NEUROLOGICAL SURGERY
Payer: MEDICARE

## 2025-03-05 ENCOUNTER — ANESTHESIA (OUTPATIENT)
Dept: OPERATING ROOM | Age: 75
End: 2025-03-05
Payer: MEDICARE

## 2025-03-05 ENCOUNTER — ANESTHESIA EVENT (OUTPATIENT)
Dept: OPERATING ROOM | Age: 75
End: 2025-03-05
Payer: MEDICARE

## 2025-03-05 VITALS
SYSTOLIC BLOOD PRESSURE: 146 MMHG | RESPIRATION RATE: 18 BRPM | DIASTOLIC BLOOD PRESSURE: 90 MMHG | TEMPERATURE: 97.2 F | HEART RATE: 74 BPM | BODY MASS INDEX: 35.21 KG/M2 | OXYGEN SATURATION: 95 % | WEIGHT: 260 LBS | HEIGHT: 72 IN

## 2025-03-05 DIAGNOSIS — Z45.42 BATTERY END OF LIFE OF SPINAL CORD STIMULATOR: ICD-10-CM

## 2025-03-05 PROCEDURE — 2500000003 HC RX 250 WO HCPCS: Performed by: NEUROLOGICAL SURGERY

## 2025-03-05 PROCEDURE — 7100000001 HC PACU RECOVERY - ADDTL 15 MIN: Performed by: NEUROLOGICAL SURGERY

## 2025-03-05 PROCEDURE — 2500000003 HC RX 250 WO HCPCS: Performed by: PHYSICIAN ASSISTANT

## 2025-03-05 PROCEDURE — 3700000000 HC ANESTHESIA ATTENDED CARE: Performed by: NEUROLOGICAL SURGERY

## 2025-03-05 PROCEDURE — 3700000001 HC ADD 15 MINUTES (ANESTHESIA): Performed by: NEUROLOGICAL SURGERY

## 2025-03-05 PROCEDURE — 2709999900 HC NON-CHARGEABLE SUPPLY: Performed by: NEUROLOGICAL SURGERY

## 2025-03-05 PROCEDURE — 7100000000 HC PACU RECOVERY - FIRST 15 MIN: Performed by: NEUROLOGICAL SURGERY

## 2025-03-05 PROCEDURE — 2500000003 HC RX 250 WO HCPCS: Performed by: NURSE ANESTHETIST, CERTIFIED REGISTERED

## 2025-03-05 PROCEDURE — 6360000002 HC RX W HCPCS: Performed by: NEUROLOGICAL SURGERY

## 2025-03-05 PROCEDURE — 6370000000 HC RX 637 (ALT 250 FOR IP): Performed by: NEUROLOGICAL SURGERY

## 2025-03-05 PROCEDURE — 6360000002 HC RX W HCPCS: Performed by: PHYSICIAN ASSISTANT

## 2025-03-05 PROCEDURE — 3600000005 HC SURGERY LEVEL 5 BASE: Performed by: NEUROLOGICAL SURGERY

## 2025-03-05 PROCEDURE — 2580000003 HC RX 258: Performed by: PHYSICIAN ASSISTANT

## 2025-03-05 PROCEDURE — 7100000010 HC PHASE II RECOVERY - FIRST 15 MIN: Performed by: NEUROLOGICAL SURGERY

## 2025-03-05 PROCEDURE — 7100000011 HC PHASE II RECOVERY - ADDTL 15 MIN: Performed by: NEUROLOGICAL SURGERY

## 2025-03-05 PROCEDURE — 6370000000 HC RX 637 (ALT 250 FOR IP): Performed by: PHYSICIAN ASSISTANT

## 2025-03-05 PROCEDURE — C1767 GENERATOR, NEURO NON-RECHARG: HCPCS | Performed by: NEUROLOGICAL SURGERY

## 2025-03-05 PROCEDURE — 2580000003 HC RX 258: Performed by: NURSE ANESTHETIST, CERTIFIED REGISTERED

## 2025-03-05 PROCEDURE — 6360000002 HC RX W HCPCS: Performed by: NURSE ANESTHETIST, CERTIFIED REGISTERED

## 2025-03-05 PROCEDURE — 3600000015 HC SURGERY LEVEL 5 ADDTL 15MIN: Performed by: NEUROLOGICAL SURGERY

## 2025-03-05 PROCEDURE — 2720000010 HC SURG SUPPLY STERILE: Performed by: NEUROLOGICAL SURGERY

## 2025-03-05 DEVICE — STIMULATOR NERVE IMPL PULSE GENRTR STRL PROCLAIM + 5 LTX: Type: IMPLANTABLE DEVICE | Site: BACK | Status: FUNCTIONAL

## 2025-03-05 RX ORDER — SODIUM CHLORIDE 0.9 % (FLUSH) 0.9 %
5-40 SYRINGE (ML) INJECTION EVERY 12 HOURS SCHEDULED
Status: DISCONTINUED | OUTPATIENT
Start: 2025-03-05 | End: 2025-03-05 | Stop reason: HOSPADM

## 2025-03-05 RX ORDER — SODIUM CHLORIDE 0.9 % (FLUSH) 0.9 %
5-40 SYRINGE (ML) INJECTION PRN
Status: DISCONTINUED | OUTPATIENT
Start: 2025-03-05 | End: 2025-03-05 | Stop reason: HOSPADM

## 2025-03-05 RX ORDER — LIDOCAINE HYDROCHLORIDE AND EPINEPHRINE 5; 5 MG/ML; UG/ML
INJECTION, SOLUTION INFILTRATION; PERINEURAL PRN
Status: DISCONTINUED | OUTPATIENT
Start: 2025-03-05 | End: 2025-03-05 | Stop reason: HOSPADM

## 2025-03-05 RX ORDER — SODIUM CHLORIDE 9 MG/ML
INJECTION, SOLUTION INTRAVENOUS CONTINUOUS
Status: DISCONTINUED | OUTPATIENT
Start: 2025-03-05 | End: 2025-03-05 | Stop reason: HOSPADM

## 2025-03-05 RX ORDER — CEFAZOLIN SODIUM 1 G/3ML
INJECTION, POWDER, FOR SOLUTION INTRAMUSCULAR; INTRAVENOUS
Status: DISCONTINUED | OUTPATIENT
Start: 2025-03-05 | End: 2025-03-05 | Stop reason: SDUPTHER

## 2025-03-05 RX ORDER — PROPOFOL 10 MG/ML
INJECTION, EMULSION INTRAVENOUS
Status: DISCONTINUED | OUTPATIENT
Start: 2025-03-05 | End: 2025-03-05 | Stop reason: SDUPTHER

## 2025-03-05 RX ORDER — ROCURONIUM BROMIDE 10 MG/ML
INJECTION, SOLUTION INTRAVENOUS
Status: DISCONTINUED | OUTPATIENT
Start: 2025-03-05 | End: 2025-03-05 | Stop reason: SDUPTHER

## 2025-03-05 RX ORDER — MEPERIDINE HYDROCHLORIDE 25 MG/ML
12.5 INJECTION INTRAMUSCULAR; INTRAVENOUS; SUBCUTANEOUS EVERY 5 MIN PRN
Status: DISCONTINUED | OUTPATIENT
Start: 2025-03-05 | End: 2025-03-05 | Stop reason: HOSPADM

## 2025-03-05 RX ORDER — ONDANSETRON 2 MG/ML
INJECTION INTRAMUSCULAR; INTRAVENOUS
Status: DISCONTINUED | OUTPATIENT
Start: 2025-03-05 | End: 2025-03-05 | Stop reason: SDUPTHER

## 2025-03-05 RX ORDER — NALOXONE HYDROCHLORIDE 0.4 MG/ML
INJECTION, SOLUTION INTRAMUSCULAR; INTRAVENOUS; SUBCUTANEOUS PRN
Status: DISCONTINUED | OUTPATIENT
Start: 2025-03-05 | End: 2025-03-05 | Stop reason: HOSPADM

## 2025-03-05 RX ORDER — LIDOCAINE HYDROCHLORIDE 20 MG/ML
INJECTION, SOLUTION INTRAVENOUS
Status: DISCONTINUED | OUTPATIENT
Start: 2025-03-05 | End: 2025-03-05 | Stop reason: SDUPTHER

## 2025-03-05 RX ORDER — ACETAMINOPHEN 500 MG
1000 TABLET ORAL ONCE
Status: COMPLETED | OUTPATIENT
Start: 2025-03-05 | End: 2025-03-05

## 2025-03-05 RX ORDER — HYDROMORPHONE HYDROCHLORIDE 1 MG/ML
0.5 INJECTION, SOLUTION INTRAMUSCULAR; INTRAVENOUS; SUBCUTANEOUS EVERY 5 MIN PRN
Status: DISCONTINUED | OUTPATIENT
Start: 2025-03-05 | End: 2025-03-05 | Stop reason: HOSPADM

## 2025-03-05 RX ORDER — BACITRACIN ZINC 500 [USP'U]/G
OINTMENT TOPICAL PRN
Status: DISCONTINUED | OUTPATIENT
Start: 2025-03-05 | End: 2025-03-05 | Stop reason: HOSPADM

## 2025-03-05 RX ORDER — PHENYLEPHRINE HCL IN 0.9% NACL 1 MG/10 ML
SYRINGE (ML) INTRAVENOUS
Status: DISCONTINUED | OUTPATIENT
Start: 2025-03-05 | End: 2025-03-05 | Stop reason: SDUPTHER

## 2025-03-05 RX ORDER — SODIUM CHLORIDE 9 MG/ML
INJECTION, SOLUTION INTRAVENOUS PRN
Status: DISCONTINUED | OUTPATIENT
Start: 2025-03-05 | End: 2025-03-05 | Stop reason: HOSPADM

## 2025-03-05 RX ORDER — VANCOMYCIN HYDROCHLORIDE 1 G/20ML
INJECTION, POWDER, LYOPHILIZED, FOR SOLUTION INTRAVENOUS PRN
Status: DISCONTINUED | OUTPATIENT
Start: 2025-03-05 | End: 2025-03-05 | Stop reason: HOSPADM

## 2025-03-05 RX ORDER — DEXAMETHASONE SODIUM PHOSPHATE 10 MG/ML
INJECTION, SOLUTION INTRA-ARTICULAR; INTRALESIONAL; INTRAMUSCULAR; INTRAVENOUS; SOFT TISSUE
Status: DISCONTINUED | OUTPATIENT
Start: 2025-03-05 | End: 2025-03-05 | Stop reason: SDUPTHER

## 2025-03-05 RX ORDER — FENTANYL CITRATE 50 UG/ML
INJECTION, SOLUTION INTRAMUSCULAR; INTRAVENOUS
Status: DISCONTINUED | OUTPATIENT
Start: 2025-03-05 | End: 2025-03-05 | Stop reason: SDUPTHER

## 2025-03-05 RX ORDER — MIDAZOLAM HYDROCHLORIDE 1 MG/ML
INJECTION, SOLUTION INTRAMUSCULAR; INTRAVENOUS
Status: DISCONTINUED | OUTPATIENT
Start: 2025-03-05 | End: 2025-03-05 | Stop reason: SDUPTHER

## 2025-03-05 RX ORDER — SODIUM CHLORIDE 9 MG/ML
INJECTION, SOLUTION INTRAVENOUS
Status: DISCONTINUED | OUTPATIENT
Start: 2025-03-05 | End: 2025-03-05 | Stop reason: SDUPTHER

## 2025-03-05 RX ADMIN — ONDANSETRON 4 MG: 2 INJECTION, SOLUTION INTRAMUSCULAR; INTRAVENOUS at 09:43

## 2025-03-05 RX ADMIN — LIDOCAINE HYDROCHLORIDE 60 MG: 20 INJECTION, SOLUTION INTRAVENOUS at 09:10

## 2025-03-05 RX ADMIN — SUGAMMADEX 236 MG: 100 INJECTION, SOLUTION INTRAVENOUS at 09:48

## 2025-03-05 RX ADMIN — Medication 100 MCG: at 09:42

## 2025-03-05 RX ADMIN — DEXAMETHASONE SODIUM PHOSPHATE 10 MG: 10 INJECTION INTRAMUSCULAR; INTRAVENOUS at 09:15

## 2025-03-05 RX ADMIN — ACETAMINOPHEN 1000 MG: 500 TABLET ORAL at 07:59

## 2025-03-05 RX ADMIN — CEFAZOLIN 1 G: 1 INJECTION, POWDER, FOR SOLUTION INTRAMUSCULAR; INTRAVENOUS at 09:19

## 2025-03-05 RX ADMIN — FENTANYL CITRATE 100 MCG: 50 INJECTION, SOLUTION INTRAMUSCULAR; INTRAVENOUS at 09:10

## 2025-03-05 RX ADMIN — PROPOFOL 200 MG: 10 INJECTION, EMULSION INTRAVENOUS at 09:10

## 2025-03-05 RX ADMIN — SODIUM CHLORIDE: 9 INJECTION, SOLUTION INTRAVENOUS at 09:04

## 2025-03-05 RX ADMIN — CEFAZOLIN 2000 MG: 2 INJECTION, POWDER, FOR SOLUTION INTRAMUSCULAR; INTRAVENOUS at 09:19

## 2025-03-05 RX ADMIN — SODIUM CHLORIDE: 0.9 INJECTION, SOLUTION INTRAVENOUS at 08:14

## 2025-03-05 RX ADMIN — ROCURONIUM BROMIDE 50 MG: 50 INJECTION INTRAVENOUS at 09:10

## 2025-03-05 RX ADMIN — MIDAZOLAM 2 MG: 1 INJECTION INTRAMUSCULAR; INTRAVENOUS at 09:00

## 2025-03-05 ASSESSMENT — PAIN - FUNCTIONAL ASSESSMENT
PAIN_FUNCTIONAL_ASSESSMENT: ADULT NONVERBAL PAIN SCALE (NPVS)
PAIN_FUNCTIONAL_ASSESSMENT: 0-10

## 2025-03-05 ASSESSMENT — PAIN DESCRIPTION - DESCRIPTORS: DESCRIPTORS: ACHING;DISCOMFORT

## 2025-03-05 NOTE — H&P
I have examined the patient and reviewed the H and P and no changes are noted    Yonas Ferreira MD    
side.  Psychiatric:         Mood and Affect: Mood normal.         Behavior: Behavior normal.         Thought Content: Thought content normal.         Judgment: Judgment normal.

## 2025-03-05 NOTE — BRIEF OP NOTE
Brief Postoperative Note      Patient: Beto Metcalf  YOB: 1950  MRN: [id number]    Date of Procedure: 3/5/2025    Pre-Op Diagnosis Codes:      * Battery end of life of spinal cord stimulator [Z45.42]    Post-Op Diagnosis: Same       Procedure(s):  [employer] spinal cord stimulator battery replacement    Surgeon(s):  Yonas Ferreira MD    Assistant:  Resident: Monty Kelly DO    Anesthesia: General    Estimated Blood Loss (mL): Minimal    Complications: None    Specimens:   ID Type Source Tests Collected by Time Destination   1 :  Hardware Hardware  Yonas Ferreira MD 3/5/2025 0949        Implants:  Implant Name Type Inv. Item Serial No.  Lot No. LRB No. Used Action   STIMULATOR NERVE IMPL PULSE GENRTR STRL PROCLAIM + 5 LTX - [id number]  STIMULATOR NERVE IMPL PULSE GENRTR STRL PROCLAIM + 5 LTX  [id number]  Left 1 Implanted         Drains: * No LDAs found *    Findings:  Infection Present At Time Of Surgery (PATOS) (choose all levels that have infection present):  No infection present  Other Findings: see dictated op note    Electronically signed by Yonas Ferreira MD on 3/5/2025 at 9:50 AM

## 2025-03-05 NOTE — ANESTHESIA POSTPROCEDURE EVALUATION
Department of Anesthesiology  Postprocedure Note    Patient: Beto Metcalf  MRN: [id number]  YOB: 1950  Date of evaluation: 3/5/2025    Procedure Summary       Date: 03/05/25 Room / Location: [address]    Anesthesia Start: 0858 Anesthesia Stop:     Procedure: [employer] spinal cord stimulator battery replacement (Bilateral) Diagnosis:       Battery end of life of spinal cord stimulator      (Battery end of life of spinal cord stimulator [Z45.42])    Surgeons: Yonas Ferreira MD Responsible Provider: Lamonte Yao MD    Anesthesia Type: general ASA Status: 4            Anesthesia Type: General    [place] Phase I:      [place]aka Phase II:      Anesthesia Post Evaluation    Patient location during evaluation: PACU  Patient participation: complete - patient participated  Level of consciousness: awake  Pain score: 3  Airway patency: patent  Nausea & Vomiting: no nausea and no vomiting  Cardiovascular status: blood pressure returned to baseline  Respiratory status: acceptable  Hydration status: euvolemic      No notable events documented.

## 2025-03-06 ENCOUNTER — TELEPHONE (OUTPATIENT)
Dept: NEUROSURGERY | Age: 75
End: 2025-03-06

## 2025-03-06 NOTE — TELEPHONE ENCOUNTER
Patient had surgery recently and he wants to know if he should leave surgical tape on  His discharge instructions leave incision open to air.    He wants to make sure he is following instructions correctly.  [phone]

## 2025-03-06 NOTE — OP NOTE
[facility]              [address] [place] [address]                            OPERATIVE REPORT      PATIENT NAME: CHINTAN NEVAREZ              : 1950  MED REC NO: [id number]                        ROOM: [facility]  ACCOUNT NO: [id number]                       ADMIT DATE: 2025  PROVIDER: Yonas Ferreira MD      DATE OF PROCEDURE:  2025    SURGEON:  Yonas Ferreira MD    PREOPERATIVE DIAGNOSIS:  Battery end of life of spinal cord stimulator.    POSTOPERATIVE DIAGNOSIS:  Battery end of life of spinal cord stimulator.    OPERATIVE PROCEDURE:    1. Removal of depleted left gluteal spinal cord stimulator with replacement of new left gluteal [employer]ese Hankins spinal cord stimulator.  2. Complex programming of spinal cord stimulator.    ANESTHESIA:  Generalized endotracheal anesthesia.    ASSISTANT:  Katie Kelly DO.    COMPLICATIONS:  None.    ESTIMATED BLOOD LOSS:  Minimal.    SPECIMEN:  Depleted battery.    OPERATIVE INDICATIONS:  The patient is a 75-year-old gentleman with history of chronic back pain, who has a spinal cord stimulator in place.  He apparently had been using a stimulator and getting good relief until the battery reached end of life, and after risks, benefits, and alternatives were discussed with the patient, it was determined that he would undergo the above-listed procedure.    DESCRIPTION OF PROCEDURE:  The patient was brought into the operating room.  A time-out was performed where he was identified by his name, medical record number, and the operative procedure which he was brought to undergo.  Next, induction of generalized endotracheal anesthesia was then commenced.  Upon completion of induction of generalized endotracheal anesthesia, he received preoperative antibiotics.  He was then flipped in a prone position on a Desmond table and all pressure points were padded.  Lumbosacral and gluteal regions were prepped and draped in usual

## 2025-03-10 LAB — SURGICAL PATHOLOGY REPORT: NORMAL

## 2025-03-15 DIAGNOSIS — I10 ESSENTIAL HYPERTENSION: ICD-10-CM

## 2025-03-15 DIAGNOSIS — E78.2 MIXED HYPERLIPIDEMIA: ICD-10-CM

## 2025-03-15 DIAGNOSIS — M15.0 PRIMARY OSTEOARTHRITIS INVOLVING MULTIPLE JOINTS: ICD-10-CM

## 2025-03-15 DIAGNOSIS — I25.10 CORONARY ARTERY DISEASE INVOLVING NATIVE CORONARY ARTERY OF NATIVE HEART WITHOUT ANGINA PECTORIS: ICD-10-CM

## 2025-03-17 RX ORDER — LISINOPRIL 20 MG/1
20 TABLET ORAL DAILY
Qty: 90 TABLET | Refills: 1 | Status: SHIPPED | OUTPATIENT
Start: 2025-03-17

## 2025-03-17 NOTE — TELEPHONE ENCOUNTER
Name of Medication(s) Requested:  Requested Prescriptions     Pending Prescriptions Disp Refills    lisinopril (PRINIVIL;ZESTRIL) 20 MG tablet [Pharmacy Med Name: LISINOPRIL 20MG TABLETS] 90 tablet 1     Sig: TAKE 1 TABLET BY MOUTH DAILY       Medication is on current medication list Yes    Dosage and directions were verified? Yes    Quantity verified: 90 day supply     Pharmacy Verified?  Yes    Last Appointment:  11/25/2024    Future appts:  Future Appointments   Date Time Provider Department Center   3/19/2025 12:45 PM Rian Coffey PA [facility]   3/21/2025  1:15 PM Sina Canchola DPM [facility]   4/2/2025  1:45 PM Rian Coffey PA [facility]   5/15/2025  3:30 PM Osbaldo Alberto MD [place] [facility]        (If no appt send self scheduling link. .REFILLAPPT)  Scheduling request sent?     [] Yes  [x] No    Does patient need updated?  [] Yes  [x] No

## 2025-03-19 ENCOUNTER — OFFICE VISIT (OUTPATIENT)
Dept: NEUROSURGERY | Age: 75
End: 2025-03-19

## 2025-03-19 DIAGNOSIS — M96.1 FAILED BACK SURGICAL SYNDROME: Primary | ICD-10-CM

## 2025-03-19 PROCEDURE — 99024 POSTOP FOLLOW-UP VISIT: CPT | Performed by: PHYSICIAN ASSISTANT

## 2025-03-21 ENCOUNTER — PROCEDURE VISIT (OUTPATIENT)
Dept: PODIATRY | Age: 75
End: 2025-03-21
Payer: MEDICARE

## 2025-03-21 VITALS — BODY MASS INDEX: 35.21 KG/M2 | HEIGHT: 72 IN | WEIGHT: 260 LBS

## 2025-03-21 DIAGNOSIS — B35.1 TINEA UNGUIUM: Primary | ICD-10-CM

## 2025-03-21 DIAGNOSIS — G60.8 HEREDITARY SENSORY NEUROPATHY: ICD-10-CM

## 2025-03-21 PROCEDURE — 11721 DEBRIDE NAIL 6 OR MORE: CPT | Performed by: PODIATRIST

## 2025-03-21 NOTE — PROGRESS NOTES
Pt here for nail and callous care  Osbaldo Alberto MD  LOV 11/25/25    Electronically signed by Sina Canchola DPM on 3/21/2025 at 1:51 PM        CC:   Painful elongated toenails 1-5 right and left    HPI  Follow-up foot ankle exam.  Elongated toenails both feet.  Presents with wife.  No open wounds.    ROS:  Const: Denies constitutional symptoms  Musculo: Denies symptoms other than stated above  Skin: Denies symptoms other than stated above      Current Outpatient Medications:     lisinopril (PRINIVIL;ZESTRIL) 20 MG tablet, TAKE 1 TABLET BY MOUTH DAILY, Disp: 90 tablet, Rfl: 1    levETIRAcetam (KEPPRA) 500 MG tablet, TAKE 1 TABLET BY MOUTH TWICE DAILY, Disp: 180 tablet, Rfl: 1    pravastatin (PRAVACHOL) 20 MG tablet, Take 1 tablet by mouth daily, Disp: 90 tablet, Rfl: 1    tamsulosin (FLOMAX) 0.4 MG capsule, Take 1 capsule by mouth daily, Disp: 90 capsule, Rfl: 1    Semaglutide-Weight Management (WEGOVY) 2.4 MG/0.75ML SOAJ SC injection, Inject 2.4 mg into the skin every 7 days (Patient taking differently: Inject 2.4 mg into the skin every 7 days Takes for weight loss-Tuesdays), Disp: 0.75 mL, Rfl: 4    metoprolol succinate (TOPROL XL) 25 MG extended release tablet, TAKE 1 TABLET BY MOUTH DAILY, Disp: 90 tablet, Rfl: 1    morphine (MS CONTIN) 30 MG extended release tablet, take 1 tablet by mouth every 12 hours for 30 DAYS START 8/21/23 END 9/19/23, Disp: , Rfl:     oxyCODONE-acetaminophen (PERCOCET) 7.5-325 MG per tablet, take 1 tablet by mouth three times a day if needed for pain for 3...  (REFER TO PRESCRIPTION NOTES)., Disp: , Rfl:     aspirin 81 MG EC tablet, Take 1 tablet by mouth daily, Disp: , Rfl:   Allergies   Allergen Reactions    Statins Other (See Comments)     Myalgias        Past Medical History:   Diagnosis Date    Basal cell carcinoma     CAD (coronary artery disease) 11/2019    Cardiomegaly 05/17/2016    Chronic back pain     Chronic back pain     CPAP (continuous positive airway pressure)

## 2025-04-04 ENCOUNTER — TELEPHONE (OUTPATIENT)
Dept: FAMILY MEDICINE CLINIC | Age: 75
End: 2025-04-04

## 2025-04-07 ENCOUNTER — TELEPHONE (OUTPATIENT)
Dept: FAMILY MEDICINE CLINIC | Age: 75
End: 2025-04-07

## 2025-04-07 NOTE — TELEPHONE ENCOUNTER
Milagros calling to tell you that his insurance company will not cover Zepbound without a prior authorization.

## 2025-04-08 DIAGNOSIS — G47.33 OBSTRUCTIVE SLEEP APNEA SYNDROME: Primary | ICD-10-CM

## 2025-04-08 DIAGNOSIS — R73.03 PREDIABETES: ICD-10-CM

## 2025-04-08 DIAGNOSIS — E66.01 CLASS 2 SEVERE OBESITY DUE TO EXCESS CALORIES WITH SERIOUS COMORBIDITY AND BODY MASS INDEX (BMI) OF 35.0 TO 35.9 IN ADULT: ICD-10-CM

## 2025-04-08 DIAGNOSIS — E66.812 CLASS 2 SEVERE OBESITY DUE TO EXCESS CALORIES WITH SERIOUS COMORBIDITY AND BODY MASS INDEX (BMI) OF 35.0 TO 35.9 IN ADULT: ICD-10-CM

## 2025-04-09 NOTE — TELEPHONE ENCOUNTER
Dr Alberto's response:         I have sent the prescription for tir[facility]epatide to [facility].  We started 25 mg and gradually titrate.  If the patient is able to obtain this then he needs to let us know in 3 weeks whether it has been effective.  We will then gradually titrate the dose.  I have placed this for indications of obesity, sleep apnea and prediabet

## 2025-04-14 NOTE — TELEPHONE ENCOUNTER
Spoke with Tia at [facility]. She said no auth is required, but the way the rx is written, the dosing is \"off label\"

## 2025-04-15 ENCOUNTER — TELEPHONE (OUTPATIENT)
Dept: FAMILY MEDICINE CLINIC | Age: 75
End: 2025-04-15

## 2025-04-15 NOTE — TELEPHONE ENCOUNTER
Attempted prior auth for Zepbound today via cover my meds and this is the response:      This plan does not accept appeal requests via ePA. A request for this medication was denied within the last 65 days. To request an appeal, please refer to the information provided on the initial denial letter or call customer service for assistance(number can be found on back of member's card).

## 2025-04-17 NOTE — TELEPHONE ENCOUNTER
Dr Alberto's response:  How does it need to be written to be approved?      Simple: Patient demonstrates quick and easy understanding

## 2025-04-18 NOTE — TELEPHONE ENCOUNTER
Per pharmacy, sleep apnea needs to be removed but it was also denied through the insurance (scanned into media).   [facility] would probably be easier to get approved per pharmacy staff but cannot have the sleep apnea as the primary diagnosis.

## 2025-04-25 DIAGNOSIS — I25.10 CORONARY ARTERY DISEASE INVOLVING NATIVE CORONARY ARTERY OF NATIVE HEART WITHOUT ANGINA PECTORIS: ICD-10-CM

## 2025-04-25 RX ORDER — PRAVASTATIN SODIUM 20 MG
20 TABLET ORAL DAILY
Qty: 90 TABLET | Refills: 1 | Status: SHIPPED | OUTPATIENT
Start: 2025-04-25

## 2025-04-25 RX ORDER — PRAVASTATIN SODIUM 80 MG/1
80 TABLET ORAL DAILY
Qty: 90 TABLET | Refills: 1 | Status: SHIPPED | OUTPATIENT
Start: 2025-04-25

## 2025-04-25 NOTE — TELEPHONE ENCOUNTER
Dr Alberto's response:         Mounjaro just received FDA approval for both obesity and sleep apnea.  That is why I put the diagnosis.  Zepbound would be just covered for obesity but I believe that was already denied

## 2025-04-26 DIAGNOSIS — E78.2 MIXED HYPERLIPIDEMIA: ICD-10-CM

## 2025-04-26 DIAGNOSIS — M15.0 PRIMARY OSTEOARTHRITIS INVOLVING MULTIPLE JOINTS: ICD-10-CM

## 2025-04-26 DIAGNOSIS — I25.10 CORONARY ARTERY DISEASE INVOLVING NATIVE CORONARY ARTERY OF NATIVE HEART WITHOUT ANGINA PECTORIS: ICD-10-CM

## 2025-04-26 DIAGNOSIS — I10 ESSENTIAL HYPERTENSION: ICD-10-CM

## 2025-04-28 RX ORDER — METOPROLOL SUCCINATE 25 MG/1
25 TABLET, EXTENDED RELEASE ORAL DAILY
Qty: 90 TABLET | Refills: 1 | Status: SHIPPED | OUTPATIENT
Start: 2025-04-28

## 2025-04-28 NOTE — TELEPHONE ENCOUNTER
Name of Medication(s) Requested:  Requested Prescriptions     Pending Prescriptions Disp Refills    metoprolol succinate (TOPROL XL) 25 MG extended release tablet [Pharmacy Med Name: METOPROLOL ER SUCCINATE 25MG TABS] 90 tablet 1     Sig: TAKE 1 TABLET BY MOUTH DAILY       Medication is on current medication list Yes    Dosage and directions were verified? Yes    Quantity verified: 90 day supply     Pharmacy Verified?  Yes    Last Appointment:  11/25/2024    Future appts:  Future Appointments   Date Time Provider Department Center   5/15/2025  3:30 PM Osbaldo Alberto MD [place] [facility]   5/29/2025  1:15 PM Sina Canchola DPM [facility] Podiat[facility]        (If no appt send self scheduling link. .REFILLAPPT)  Scheduling request sent?     [] Yes  [x] No    Does patient need updated?  [] Yes  [x] No   no

## 2025-05-02 DIAGNOSIS — E66.01 CLASS 2 SEVERE OBESITY DUE TO EXCESS CALORIES WITH SERIOUS COMORBIDITY AND BODY MASS INDEX (BMI) OF 35.0 TO 35.9 IN ADULT (HCC): ICD-10-CM

## 2025-05-02 DIAGNOSIS — E66.812 CLASS 2 SEVERE OBESITY DUE TO EXCESS CALORIES WITH SERIOUS COMORBIDITY AND BODY MASS INDEX (BMI) OF 35.0 TO 35.9 IN ADULT (HCC): ICD-10-CM

## 2025-05-02 DIAGNOSIS — R73.03 PREDIABETES: ICD-10-CM

## 2025-05-02 DIAGNOSIS — G47.33 OBSTRUCTIVE SLEEP APNEA SYNDROME: ICD-10-CM

## 2025-05-09 ENCOUNTER — TELEPHONE (OUTPATIENT)
Dept: FAMILY MEDICINE CLINIC | Age: 75
End: 2025-05-09

## 2025-05-09 DIAGNOSIS — G47.33 OBSTRUCTIVE SLEEP APNEA SYNDROME: ICD-10-CM

## 2025-05-09 DIAGNOSIS — E66.812 CLASS 2 SEVERE OBESITY DUE TO EXCESS CALORIES WITH SERIOUS COMORBIDITY AND BODY MASS INDEX (BMI) OF 35.0 TO 35.9 IN ADULT (HCC): ICD-10-CM

## 2025-05-09 DIAGNOSIS — E66.01 CLASS 2 SEVERE OBESITY DUE TO EXCESS CALORIES WITH SERIOUS COMORBIDITY AND BODY MASS INDEX (BMI) OF 35.0 TO 35.9 IN ADULT (HCC): ICD-10-CM

## 2025-05-09 DIAGNOSIS — R73.03 PREDIABETES: ICD-10-CM

## 2025-05-09 NOTE — TELEPHONE ENCOUNTER
Beto calling for a new script for Zepbound sent to [facility] in [place].      That pharmacy has been working to get this medication covered for him and has been successful.    They are requesting a new script for this.

## 2025-05-09 NOTE — TELEPHONE ENCOUNTER
Dr Alberto's routing comment back to the pool:      Prescription was sent to [facility] that the script was sent to [facility] in [place].

## 2025-05-15 ENCOUNTER — OFFICE VISIT (OUTPATIENT)
Dept: PRIMARY CARE CLINIC | Age: 75
End: 2025-05-15
Payer: MEDICARE

## 2025-05-15 VITALS
DIASTOLIC BLOOD PRESSURE: 78 MMHG | SYSTOLIC BLOOD PRESSURE: 128 MMHG | OXYGEN SATURATION: 96 % | TEMPERATURE: 98.4 F | BODY MASS INDEX: 36.62 KG/M2 | HEART RATE: 63 BPM | WEIGHT: 270 LBS

## 2025-05-15 DIAGNOSIS — N40.1 BENIGN PROSTATIC HYPERPLASIA WITH INCOMPLETE BLADDER EMPTYING: ICD-10-CM

## 2025-05-15 DIAGNOSIS — E78.2 MIXED HYPERLIPIDEMIA: ICD-10-CM

## 2025-05-15 DIAGNOSIS — M15.0 PRIMARY OSTEOARTHRITIS INVOLVING MULTIPLE JOINTS: ICD-10-CM

## 2025-05-15 DIAGNOSIS — R39.14 BENIGN PROSTATIC HYPERPLASIA WITH INCOMPLETE BLADDER EMPTYING: ICD-10-CM

## 2025-05-15 DIAGNOSIS — E66.812 CLASS 2 SEVERE OBESITY DUE TO EXCESS CALORIES WITH SERIOUS COMORBIDITY AND BODY MASS INDEX (BMI) OF 36.0 TO 36.9 IN ADULT (HCC): ICD-10-CM

## 2025-05-15 DIAGNOSIS — R73.03 PREDIABETES: Primary | ICD-10-CM

## 2025-05-15 DIAGNOSIS — E66.01 CLASS 2 SEVERE OBESITY DUE TO EXCESS CALORIES WITH SERIOUS COMORBIDITY AND BODY MASS INDEX (BMI) OF 36.0 TO 36.9 IN ADULT (HCC): ICD-10-CM

## 2025-05-15 DIAGNOSIS — I25.10 CORONARY ARTERY DISEASE INVOLVING NATIVE CORONARY ARTERY OF NATIVE HEART WITHOUT ANGINA PECTORIS: ICD-10-CM

## 2025-05-15 DIAGNOSIS — I10 ESSENTIAL HYPERTENSION: ICD-10-CM

## 2025-05-15 DIAGNOSIS — G40.909 NONINTRACTABLE EPILEPSY WITHOUT STATUS EPILEPTICUS, UNSPECIFIED EPILEPSY TYPE (HCC): ICD-10-CM

## 2025-05-15 LAB — HBA1C MFR BLD: 5.6 %

## 2025-05-15 PROCEDURE — 99214 OFFICE O/P EST MOD 30 MIN: CPT | Performed by: INTERNAL MEDICINE

## 2025-05-15 PROCEDURE — 3074F SYST BP LT 130 MM HG: CPT | Performed by: INTERNAL MEDICINE

## 2025-05-15 PROCEDURE — 83036 HEMOGLOBIN GLYCOSYLATED A1C: CPT | Performed by: INTERNAL MEDICINE

## 2025-05-15 PROCEDURE — 3078F DIAST BP <80 MM HG: CPT | Performed by: INTERNAL MEDICINE

## 2025-05-15 PROCEDURE — 1159F MED LIST DOCD IN RCRD: CPT | Performed by: INTERNAL MEDICINE

## 2025-05-15 PROCEDURE — G2211 COMPLEX E/M VISIT ADD ON: HCPCS | Performed by: INTERNAL MEDICINE

## 2025-05-15 PROCEDURE — 1123F ACP DISCUSS/DSCN MKR DOCD: CPT | Performed by: INTERNAL MEDICINE

## 2025-05-15 RX ORDER — LISINOPRIL 20 MG/1
20 TABLET ORAL DAILY
Qty: 90 TABLET | Refills: 1 | Status: SHIPPED | OUTPATIENT
Start: 2025-05-15

## 2025-05-15 RX ORDER — LEVETIRACETAM 500 MG/1
500 TABLET ORAL 2 TIMES DAILY
Qty: 180 TABLET | Refills: 1 | Status: SHIPPED | OUTPATIENT
Start: 2025-05-15

## 2025-05-15 RX ORDER — PRAVASTATIN SODIUM 80 MG/1
80 TABLET ORAL DAILY
Qty: 90 TABLET | Refills: 1 | Status: SHIPPED | OUTPATIENT
Start: 2025-05-15

## 2025-05-15 RX ORDER — TAMSULOSIN HYDROCHLORIDE 0.4 MG/1
0.4 CAPSULE ORAL DAILY
Qty: 90 CAPSULE | Refills: 1 | Status: SHIPPED | OUTPATIENT
Start: 2025-05-15

## 2025-05-15 RX ORDER — PRAVASTATIN SODIUM 20 MG
20 TABLET ORAL DAILY
Qty: 90 TABLET | Refills: 1 | Status: SHIPPED | OUTPATIENT
Start: 2025-05-15

## 2025-05-15 RX ORDER — METOPROLOL SUCCINATE 25 MG/1
25 TABLET, EXTENDED RELEASE ORAL DAILY
Qty: 90 TABLET | Refills: 1 | Status: SHIPPED | OUTPATIENT
Start: 2025-05-15

## 2025-05-15 SDOH — ECONOMIC STABILITY: FOOD INSECURITY: WITHIN THE PAST 12 MONTHS, YOU WORRIED THAT YOUR FOOD WOULD RUN OUT BEFORE YOU GOT MONEY TO BUY MORE.: NEVER TRUE

## 2025-05-15 SDOH — ECONOMIC STABILITY: FOOD INSECURITY: WITHIN THE PAST 12 MONTHS, THE FOOD YOU BOUGHT JUST DIDN'T LAST AND YOU DIDN'T HAVE MONEY TO GET MORE.: NEVER TRUE

## 2025-05-15 ASSESSMENT — PATIENT HEALTH QUESTIONNAIRE - PHQ9
2. FEELING DOWN, DEPRESSED OR HOPELESS: NOT AT ALL
SUM OF ALL RESPONSES TO PHQ QUESTIONS 1-9: 0
1. LITTLE INTEREST OR PLEASURE IN DOING THINGS: NOT AT ALL

## 2025-05-15 NOTE — PROGRESS NOTES
19  Beto Metcalf : 1950 Sex: male  Age: 75 y.o.    Chief Complaint   Patient presents with    Hypertension    Cholesterol Problem         Patient was fine able to obtain Zepbound.  He has lots of issues related to obesity including continued back pain and arthritic pain.  He is currently on 2 and half milligrams but this will be increased to 5 mg after he has completed a 1 month course of 2-/.  Patient denies cardiac symptoms.  He denies any central nervous system symptoms.  He denies seizure activity.  He denies peripheral lymphedema.  He is working part-time again at [employer].    Hypertension    Hyperlipidemia    Other        Review of Systems  Health Maintenance:  Colonoscopy - (2020) Anayeli diverticular dx  Colonoscopy Screening - (2017)  Couseled on Home Safety - (5/10/2017)  Physical Exam -2024  Prostate Exam - (2021)  Psa Test -2024  Rectal Exam - (2021)  EKG  Colonoscopy - (2017) ARABELLA  EGD - () VITO  Sleep Study - (2014)   Influenza Vaccination - ()  Prevnar Vaccine - (2018) SLIP GIVEN still has not gotten as of 2024  Shingrix Vaccine (Shingles) - (2018) SLIP GIVEN   COVID VACCINE   1. Obesity.  2. Hyperlipidemia. BEGAN SIMVISTATIN 2/1/10  3. HYPERTENSION BEGAN LISINOPRIL/HCT 2/1/10  4. Erectile dysfunction.  5. History of basal cell carcinoma.  6. History of previous sebaceous cyst.  7. OA HAD BILATERAL KNEE REPLACEMENTS  8. CHRONIC BACK PAIN- TASH EXERCISES RECOMMENDED 3/11  9. ESOPHAGITIS/PUD-  VITO  10. FEVER/DIARRHEA/RASH - PROBABLY VIRAL 12 ADMITTED TO [facility]- SEE REPORTS  11. HYPOGONADISM- MRI ORDERED 12  12. SNORING-FATIGUE- SLEEP APNEA- SLEEP STUDY ORDERED 10/13-CPAP BEGUN  13. LUMBAR RADICULOPATHY-8/17/15-PHILLIP/DONNY-SURGERY SCHEDULED 17  14. PNEUMONIA- 5/10/16-CXR CLEARED  15. CARDIOMEGALY-16-NORMAL ECHO 16 and  16. LOW BACK PAIN- REFERRED TO [facility] 18  17. OBESITY- GLP1

## 2025-05-16 DIAGNOSIS — R73.03 PREDIABETES: ICD-10-CM

## 2025-05-16 DIAGNOSIS — I10 ESSENTIAL HYPERTENSION: ICD-10-CM

## 2025-05-16 DIAGNOSIS — E78.2 MIXED HYPERLIPIDEMIA: ICD-10-CM

## 2025-05-16 DIAGNOSIS — I25.10 CORONARY ARTERY DISEASE INVOLVING NATIVE CORONARY ARTERY OF NATIVE HEART WITHOUT ANGINA PECTORIS: ICD-10-CM

## 2025-05-16 DIAGNOSIS — M15.0 PRIMARY OSTEOARTHRITIS INVOLVING MULTIPLE JOINTS: ICD-10-CM

## 2025-05-16 DIAGNOSIS — N40.1 BENIGN PROSTATIC HYPERPLASIA WITH INCOMPLETE BLADDER EMPTYING: ICD-10-CM

## 2025-05-16 DIAGNOSIS — R39.14 BENIGN PROSTATIC HYPERPLASIA WITH INCOMPLETE BLADDER EMPTYING: ICD-10-CM

## 2025-05-16 LAB
ALBUMIN: 3.9 G/DL (ref 3.5–5.2)
ALP BLD-CCNC: 65 U/L (ref 40–129)
ALT SERPL-CCNC: 23 U/L (ref 0–50)
ANION GAP SERPL CALCULATED.3IONS-SCNC: 12 MMOL/L (ref 7–16)
AST SERPL-CCNC: 39 U/L (ref 0–50)
BILIRUB SERPL-MCNC: 0.6 MG/DL (ref 0–1.2)
BUN BLDV-MCNC: 10 MG/DL (ref 8–23)
CALCIUM SERPL-MCNC: 9.3 MG/DL (ref 8.8–10.2)
CHLORIDE BLD-SCNC: 102 MMOL/L (ref 98–107)
CHOLESTEROL, TOTAL: 148 MG/DL
CO2: 23 MMOL/L (ref 22–29)
CREAT SERPL-MCNC: 0.8 MG/DL (ref 0.7–1.2)
GFR, ESTIMATED: >90 ML/MIN/1.73M2
GLUCOSE BLD-MCNC: 100 MG/DL (ref 74–99)
HDLC SERPL-MCNC: 58 MG/DL
LDL CHOLESTEROL: 70 MG/DL
POTASSIUM SERPL-SCNC: 4.2 MMOL/L (ref 3.5–5.1)
SODIUM BLD-SCNC: 138 MMOL/L (ref 136–145)
TOTAL PROTEIN: 7.2 G/DL (ref 6.4–8.3)
TRIGL SERPL-MCNC: 101 MG/DL
VLDLC SERPL CALC-MCNC: 20 MG/DL

## 2025-05-17 LAB — KEPPRA: 13 UG/ML

## 2025-05-18 ENCOUNTER — RESULTS FOLLOW-UP (OUTPATIENT)
Dept: PRIMARY CARE CLINIC | Age: 75
End: 2025-05-18

## 2025-05-28 NOTE — TELEPHONE ENCOUNTER
Patient called pharmacy, they do not have this order, can you resend.    Last Appointment:  5/15/2025  Future Appointments   Date Time Provider Department Center   5/29/2025  1:15 PM Sina Canchola DPM Col Podiat[facility]   9/18/2025  1:30 PM Osbaldo Alberto MD [place] [facility] DEP

## 2025-05-29 ENCOUNTER — PROCEDURE VISIT (OUTPATIENT)
Dept: PODIATRY | Age: 75
End: 2025-05-29
Payer: MEDICARE

## 2025-05-29 VITALS
BODY MASS INDEX: 36.62 KG/M2 | SYSTOLIC BLOOD PRESSURE: 137 MMHG | WEIGHT: 270 LBS | TEMPERATURE: 97.1 F | DIASTOLIC BLOOD PRESSURE: 86 MMHG

## 2025-05-29 DIAGNOSIS — G60.8 HEREDITARY SENSORY NEUROPATHY: ICD-10-CM

## 2025-05-29 DIAGNOSIS — B35.1 TINEA UNGUIUM: Primary | ICD-10-CM

## 2025-05-29 PROCEDURE — 11721 DEBRIDE NAIL 6 OR MORE: CPT | Performed by: PODIATRIST

## 2025-05-29 NOTE — PROGRESS NOTES
Nail care Osbaldo Alberto MD  LOV 5/18/25    CC:   Painful elongated toenails 1-5 right and left    HPI  Follow-up foot and ankle exam.  Long toenails both feet.  No recent injury or trauma.  Denies any recent injury.  Presents elongated toenails    ROS:  Const: Denies constitutional symptoms  Musculo: Denies symptoms other than stated above  Skin: Denies symptoms other than stated above      Current Outpatient Medications:     tirzepatide-weight management (ZEPBOUND) 5 MG/0.5ML SOAJ subCUTAneous auto-injector pen, Inject 5 mg into the skin every 7 days, Disp: 2 mL, Rfl: 0    levETIRAcetam (KEPPRA) 500 MG tablet, Take 1 tablet by mouth 2 times daily, Disp: 180 tablet, Rfl: 1    lisinopril (PRINIVIL;ZESTRIL) 20 MG tablet, Take 1 tablet by mouth daily, Disp: 90 tablet, Rfl: 1    metoprolol succinate (TOPROL XL) 25 MG extended release tablet, Take 1 tablet by mouth daily, Disp: 90 tablet, Rfl: 1    pravastatin (PRAVACHOL) 20 MG tablet, Take 1 tablet by mouth daily, Disp: 90 tablet, Rfl: 1    pravastatin (PRAVACHOL) 80 MG tablet, Take 1 tablet by mouth daily, Disp: 90 tablet, Rfl: 1    tamsulosin (FLOMAX) 0.4 MG capsule, Take 1 capsule by mouth daily, Disp: 90 capsule, Rfl: 1    tirzepatide-weight management (ZEPBOUND) 2.5 MG/0.5ML SOAJ subCUTAneous auto-injector pen, Inject 2.5 mg into the skin every 7 days, Disp: 2 mL, Rfl: 0    morphine (MS CONTIN) 30 MG extended release tablet, take 1 tablet by mouth every 12 hours for 30 DAYS START 8/21/23 END 9/19/23, Disp: , Rfl:     oxyCODONE-acetaminophen (PERCOCET) 7.5-325 MG per tablet, take 1 tablet by mouth three times a day if needed for pain for 3...  (REFER TO PRESCRIPTION NOTES)., Disp: , Rfl:     aspirin 81 MG EC tablet, Take 1 tablet by mouth daily, Disp: , Rfl:   Allergies   Allergen Reactions    Statins Other (See Comments)     Myalgias        Past Medical History:   Diagnosis Date    Basal cell carcinoma     CAD (coronary artery disease) 11/2019    Cardiomegaly

## 2025-06-25 NOTE — TELEPHONE ENCOUNTER
at work , wife is calling on behalf of .  Zep bound was 5mg last month and working, no side effects ,wanted to bump up to 7.5mg.    I have pended 7.5 if this is what you decide . Please advise? [facility]

## 2025-07-21 RX ORDER — TIRZEPATIDE 7.5 MG/.5ML
INJECTION, SOLUTION SUBCUTANEOUS
Qty: 2 ML | Refills: 0 | Status: SHIPPED | OUTPATIENT
Start: 2025-07-21

## 2025-07-21 RX ORDER — TIRZEPATIDE 10 MG/.5ML
10 INJECTION, SOLUTION SUBCUTANEOUS
Qty: 2 ML | Refills: 0 | Status: SHIPPED | OUTPATIENT
Start: 2025-07-21

## 2025-07-21 NOTE — TELEPHONE ENCOUNTER
Patient called in for this refill. He said he wanted to send the message that he has had no side effects of this and if you want to bump him up, he would be fine with that.

## 2025-08-08 ENCOUNTER — PROCEDURE VISIT (OUTPATIENT)
Dept: PODIATRY | Age: 75
End: 2025-08-08
Payer: MEDICARE

## 2025-08-08 VITALS — WEIGHT: 270 LBS | BODY MASS INDEX: 36.62 KG/M2

## 2025-08-08 DIAGNOSIS — G60.8 HEREDITARY SENSORY NEUROPATHY: ICD-10-CM

## 2025-08-08 DIAGNOSIS — B35.1 TINEA UNGUIUM: Primary | ICD-10-CM

## 2025-08-08 PROCEDURE — 11721 DEBRIDE NAIL 6 OR MORE: CPT | Performed by: PODIATRIST

## (undated) DEVICE — ALCOHOL RUBBING ISO 16OZ 70%

## (undated) DEVICE — 5.0MM PRECISION ROUND

## (undated) DEVICE — GLOVE SURG SZ 65 THK91MIL LTX FREE SYN POLYISOPRENE

## (undated) DEVICE — GOWN,SIRUS,FABRNF,XL,20/CS: Brand: MEDLINE

## (undated) DEVICE — 3M™ IOBAN™ 2 ANTIMICROBIAL INCISE DRAPE 6650EZ: Brand: IOBAN™ 2

## (undated) DEVICE — 1.5L THIN WALL CAN: Brand: CRD

## (undated) DEVICE — BLADE CLP TAPR HD WET DRY CAPABILITY GTT IN CHARGING USE

## (undated) DEVICE — SYRINGE 20ML LL S/C 50

## (undated) DEVICE — STAPLER SKIN L39MM DIA0.53MM CRWN 5.7MM S STL FIX HD PROX

## (undated) DEVICE — SOLUTION IV IRRIG WATER 1000ML POUR BRL 2F7114

## (undated) DEVICE — PICO 7 10CM X 30CM: Brand: PICO™ 7

## (undated) DEVICE — SYRINGE MED 20ML STD CLR PLAS LUERLOCK TIP N CTRL DISP

## (undated) DEVICE — TIP APPL GEL PLT ENDO 5MMX32CM

## (undated) DEVICE — AORTIC PUNCHES ARE USED TO CREATE A UNIFORM OPENING IN BLOOD VESSELS DURING CARDIOVASCULAR SURGERY. THE VESSEL GRAFT IS INSERTED INTO THE CREATED OPENING AND SUTURED TO THE VESSEL WALL. AORTIC LANCETS ARE USED TO MAKE A SMALL UNIFORM CUT IN A BLOOD VESSEL TO FACILITATE INSERTION OF AN AORTIC PUNCH.  PUNCHES COME IN VARIOUS LENGTHS, DIAMETERS AND TIP CONFIGURATIONS.: Brand: CLEANCUT ROTATING AORTIC PUNCH

## (undated) DEVICE — CANNULA INJ L2.5IN BLNT TIP 3MM CLR BODY W/ 1 W VLV DLP

## (undated) DEVICE — CATHETER IV 24GA L3/4IN PERIPH YEL S STL POLYUR PLAS HUB

## (undated) DEVICE — SOLUTION IV 50ML 0.9% SOD CHL PLAS CONT USP VIAFLX

## (undated) DEVICE — HYPODERMIC SAFETY NEEDLE: Brand: MAGELLAN

## (undated) DEVICE — BLADE ES L6IN ELASTOMERIC COAT EXT DURABLE BEND UPTO 90DEG

## (undated) DEVICE — Device

## (undated) DEVICE — BLOWER COR ART L16.5CM PLAS SHFT MAL W/ MIST IV SET AXIUS

## (undated) DEVICE — PERFUSION PACK CUST OPN HRT

## (undated) DEVICE — TOWEL,OR,DSP,ST,WHITE,DLX,4/PK,20PK/CS: Brand: MEDLINE

## (undated) DEVICE — GOWN,SIRUS,FABRNF,L,20/CS: Brand: MEDLINE

## (undated) DEVICE — GLOVE SURG 8.5 PF POLYMER WHT STRL SIGN LTX ESSENTIAL LTX

## (undated) DEVICE — RETRACTOR RULTRACT INTERN MAMMARY ARTERY

## (undated) DEVICE — AVID DUAL STAGE VENOUS DRAINAGE CANNULA: Brand: AVID DUAL STAGE VENOUS DRAINAGE CANNULA

## (undated) DEVICE — RETROGRADE CARDIOPLEGIA CATHETER: Brand: EDWARDS LIFESCIENCES RETROGRADE CARDIOPLEGIA CATHETER

## (undated) DEVICE — CATHETER THOR 32FR L23IN PVC 6 EYELET STR ATRAUM

## (undated) DEVICE — SOLUTION SURG PREP 26 CC PURPREP

## (undated) DEVICE — CHLORAPREP 26ML ORANGE

## (undated) DEVICE — GLOVE SURG SZ 7.5 L11.73IN FNGR THK9.8MIL STRW LTX POLYMER

## (undated) DEVICE — CONNECTOR PERF W3/8XH0.5IN BASE Y SHP REDUC W/O LUERLOCK

## (undated) DEVICE — CAMERA CARDIAC STRYKER 1488 HD

## (undated) DEVICE — JACKSON TABLE POSITIONER KIT: Brand: MEDLINE INDUSTRIES, INC.

## (undated) DEVICE — STIMULATOR NERVE PT CTRL PROCLAIM

## (undated) DEVICE — CLOTH SURG PREP PREOPERATIVE CHLORHEXIDINE GLUC 2% READYPREP

## (undated) DEVICE — GARMENT,MEDLINE,DVT,INT,CALF,MED, GEN2: Brand: MEDLINE

## (undated) DEVICE — DRAPE THER FLUID WARMING 66X44 IN FLAT SLUSH DBL DISC ORS

## (undated) DEVICE — BLOOD TRANSFUSION FILTER: Brand: HAEMONETICS

## (undated) DEVICE — SKIN AFFIX SURG ADHESIVE 72/CS 0.55ML: Brand: MEDLINE

## (undated) DEVICE — Z DISCONTINUED PER MEDLINE USE 2425483 TAPE UMB L30IN DIA1/8IN WHT COT NONABSORBABLE W/O NDL FOR

## (undated) DEVICE — APPLICATOR PREP 26ML 0.7% IOD POVACRYLEX 74% ISO ALC ST

## (undated) DEVICE — LUMBAR LAMINECTOMY: Brand: MEDLINE INDUSTRIES, INC.

## (undated) DEVICE — GLOVE ORANGE PI 7 1/2   MSG9075

## (undated) DEVICE — CARDIAC STRYKER STERNAL SAW

## (undated) DEVICE — DRESSING FOAM W22XL25CM FILVE LAYR FOAM DP DEF SAFETAC

## (undated) DEVICE — LIQUIBAND RAPID ADHESIVE 36/CS 0.8ML: Brand: MEDLINE

## (undated) DEVICE — TTL1LYR 16FR10ML 100%SIL TMPST TR: Brand: MEDLINE

## (undated) DEVICE — GLOVE ORANGE PI 7   MSG9070

## (undated) DEVICE — TUBING, SUCTION, 3/16" X 12', STRAIGHT: Brand: MEDLINE

## (undated) DEVICE — MPS® PRESSURE LINE W/TRANSDUCER: Brand: MPS

## (undated) DEVICE — AGENT HEMSTAT W4XL8IN OXIDIZED REGENERATED CELOS ABSRB

## (undated) DEVICE — TUBING SUCT 12FR MAL ALUM SHFT FN CAP VENT UNIV CONN W/ OBT

## (undated) DEVICE — INSUFFLATION TUBING SET WITH FILTER, FUNNEL CONNECTOR AND LUER LOCK: Brand: JOSNOE MEDICAL INC

## (undated) DEVICE — CATHETER THOR 32FR L23IN PVC 5 EYELET STR ATRAUM

## (undated) DEVICE — BLADE CLIPPER GEN PURP NS

## (undated) DEVICE — LABEL MED CARD SURG 4 IN PANEL STRL

## (undated) DEVICE — PACK,UNIVERSAL,NO GOWNS: Brand: MEDLINE

## (undated) DEVICE — Device: Brand: ZDRIVE™

## (undated) DEVICE — E-Z CLEAN, NON-STICK, PTFE COATED, ELECTROSURGICAL BLADE ELECTRODE, 6.5 INCH (16.5 CM): Brand: MEGADYNE

## (undated) DEVICE — CONNECTOR PERF 0.25X0.25IN STR W/O LUERLOCK

## (undated) DEVICE — SET CARDIAC II

## (undated) DEVICE — 3M(TM) MEDIPORE(TM) +PAD SOFT CLOTH ADHESIVE WOUND DRESSING 3569: Brand: 3M™ MEDIPORE™

## (undated) DEVICE — DRAIN SURG SGL COLL PT TB FOR ATS BG OASIS

## (undated) DEVICE — ADHESIVE SKIN CLOSURE TOP 36 CC HI VISC DERMBND MINI

## (undated) DEVICE — NEPTUNE E-SEP 125MM SUCTION SLEEVE: Brand: NEPTUNE E-SEP

## (undated) DEVICE — READY WET SKIN SCRUB TRAY-LF: Brand: MEDLINE INDUSTRIES, INC.

## (undated) DEVICE — GLOVE ORANGE PI 8   MSG9080

## (undated) DEVICE — CANNULA PERF 7FR L5.5IN AORT ROOT RADPQ STD TIP W/ VENT LN

## (undated) DEVICE — ELECTRODE PT RET AD L9FT HI MOIST COND ADH HYDRGEL CORDED

## (undated) DEVICE — KIT PLT RATIO DISPNS KT 2IN CANN TIP SPRY TIP DISP MAGELLAN

## (undated) DEVICE — SET SPINAL NEURO STNEU1

## (undated) DEVICE — TOWEL,OR,DSP,ST,BLUE,STD,6/PK,12PK/CS: Brand: MEDLINE

## (undated) DEVICE — TAPE ADH W2INXL10YD PLAS TRNSPAR H2O RESIST HYPOALRG CURAD

## (undated) DEVICE — VASOVIEW 2 HEMOPRO MIN ORD 5 EA

## (undated) DEVICE — SHEET,DRAPE,53X77,STERILE: Brand: MEDLINE

## (undated) DEVICE — BASIC SINGLE BASIN 1-LF: Brand: MEDLINE INDUSTRIES, INC.

## (undated) DEVICE — GRADUATE

## (undated) DEVICE — SKIN PREP TRAY 4 COMPARTM TRAY: Brand: MEDLINE INDUSTRIES, INC.

## (undated) DEVICE — MPS® DELIVERY SET W/ARREST AGENT AND ADDITIVE CASSETTES, HEAT EXCHANGER & 10 FT. DELIVERY TUBING: Brand: MPS

## (undated) DEVICE — SOLUTION IV IRRIG POUR BRL 0.9% SODIUM CHL 2F7124

## (undated) DEVICE — 6 FOOT DISPOSABLE EXTENSION CABLE WITH SAFE CONNECT / SCREW-DOWN

## (undated) DEVICE — SYRINGE MED 10ML LUERLOCK TIP W/O SFTY DISP

## (undated) DEVICE — SET CARDIAC I

## (undated) DEVICE — CONNECTOR PERF W64XH64XL64MM W  LUERLOCK

## (undated) DEVICE — Z INACTIVE USE 2662641 SOLUTION IV 1000ML 140MEQ/L SOD 5MEQ/L K 3MEQ/L MG 27MEQ/L

## (undated) DEVICE — TAPE ADH W2INXL10YD WHT PAPR GENTLE BRTH FLX COMFORTABLE

## (undated) DEVICE — GLOVE SURG SZ 6 THK91MIL LTX FREE SYN POLYISOPRENE ANTI

## (undated) DEVICE — STERILE LATEX POWDER FREE SURGICAL GLOVES WITH HYDROGEL COATING: Brand: PROTEXIS

## (undated) DEVICE — PACK OPEN HRT DRP

## (undated) DEVICE — CLIP INT SM TI EZ LD LIG SYS WECK HORZ

## (undated) DEVICE — SET ACB VEIN

## (undated) DEVICE — SURGICAL PROCEDURE PACK CRD SEHC

## (undated) DEVICE — SET SURG BASIN OPEN HEART NO  1 REUSABLE

## (undated) DEVICE — AEGIS 1" DISK 4MM HOLE, PEEL OPEN: Brand: MEDLINE

## (undated) DEVICE — DRAPE,REIN 53X77,STERILE: Brand: MEDLINE

## (undated) DEVICE — PADDLE INTERN DEFIB CHILD